# Patient Record
Sex: FEMALE | Race: WHITE | NOT HISPANIC OR LATINO
[De-identification: names, ages, dates, MRNs, and addresses within clinical notes are randomized per-mention and may not be internally consistent; named-entity substitution may affect disease eponyms.]

---

## 2017-05-16 ENCOUNTER — APPOINTMENT (OUTPATIENT)
Dept: NEUROLOGY | Facility: CLINIC | Age: 72
End: 2017-05-16

## 2017-05-16 VITALS
HEIGHT: 66 IN | BODY MASS INDEX: 30.53 KG/M2 | WEIGHT: 190 LBS | SYSTOLIC BLOOD PRESSURE: 157 MMHG | HEART RATE: 82 BPM | DIASTOLIC BLOOD PRESSURE: 84 MMHG | TEMPERATURE: 97.9 F | OXYGEN SATURATION: 97 %

## 2017-05-16 VITALS — SYSTOLIC BLOOD PRESSURE: 167 MMHG | DIASTOLIC BLOOD PRESSURE: 93 MMHG

## 2018-05-15 ENCOUNTER — APPOINTMENT (OUTPATIENT)
Dept: NEUROLOGY | Facility: CLINIC | Age: 73
End: 2018-05-15
Payer: MEDICARE

## 2018-05-15 VITALS
OXYGEN SATURATION: 97 % | WEIGHT: 177 LBS | BODY MASS INDEX: 28.45 KG/M2 | SYSTOLIC BLOOD PRESSURE: 159 MMHG | DIASTOLIC BLOOD PRESSURE: 81 MMHG | HEART RATE: 76 BPM | HEIGHT: 66 IN

## 2018-05-15 PROCEDURE — 99214 OFFICE O/P EST MOD 30 MIN: CPT

## 2019-04-22 ENCOUNTER — RX RENEWAL (OUTPATIENT)
Age: 74
End: 2019-04-22

## 2019-05-21 ENCOUNTER — APPOINTMENT (OUTPATIENT)
Dept: NEUROLOGY | Facility: CLINIC | Age: 74
End: 2019-05-21
Payer: MEDICARE

## 2019-05-21 VITALS
TEMPERATURE: 98.1 F | BODY MASS INDEX: 27 KG/M2 | SYSTOLIC BLOOD PRESSURE: 161 MMHG | DIASTOLIC BLOOD PRESSURE: 74 MMHG | HEART RATE: 89 BPM | WEIGHT: 168 LBS | HEIGHT: 66 IN | OXYGEN SATURATION: 97 %

## 2019-05-21 PROCEDURE — 99214 OFFICE O/P EST MOD 30 MIN: CPT

## 2019-05-21 NOTE — DISCUSSION/SUMMARY
[FreeTextEntry1] : Patient with Epilepsy s/p right frontotemporal ablation in 2003. Last known seizure was in 2005.\par \par Continues to do well on Depakote and Carbamazepine. Continues to have B/L tremor that does appear to be essential tremor that is also affected by Depakote. Symptoms are stable, will continue to monitor.  Recommend the following:\par \par 1. Continue Depakote 250mg tabs, 3 tablets daily. BOOM\par 2. Continue Carbamazepine ER 600mg TID ( 300mg tabs) BOOM\par 3. Check labs as ordered\par \par \par Follow up yearly.

## 2019-05-21 NOTE — PHYSICAL EXAM
[General Appearance - Alert] : alert [General Appearance - In No Acute Distress] : in no acute distress [Oriented To Time, Place, And Person] : oriented to person, place, and time [Impaired Insight] : insight and judgment were intact [Affect] : the affect was normal [Person] : oriented to person [Place] : oriented to place [Time] : oriented to time [Concentration Intact] : normal concentrating ability [Visual Intact] : visual attention was ~T not ~L decreased [Naming Objects] : no difficulty naming common objects [Repeating Phrases] : no difficulty repeating a phrase [Writing A Sentence] : no difficulty writing a sentence [Fluency] : fluency intact [Comprehension] : comprehension intact [Reading] : reading intact [Past History] : adequate knowledge of personal past history [Cranial Nerves Optic (II)] : visual acuity intact bilaterally,  visual fields full to confrontation, pupils equal round and reactive to light [Cranial Nerves Oculomotor (III)] : extraocular motion intact [Cranial Nerves Trigeminal (V)] : facial sensation intact symmetrically [Cranial Nerves Facial (VII)] : face symmetrical [Cranial Nerves Vestibulocochlear (VIII)] : hearing was intact bilaterally [Cranial Nerves Glossopharyngeal (IX)] : tongue and palate midline [Cranial Nerves Accessory (XI - Cranial And Spinal)] : head turning and shoulder shrug symmetric [Cranial Nerves Hypoglossal (XII)] : there was no tongue deviation with protrusion [Motor Tone] : muscle tone was normal in all four extremities [Motor Strength] : muscle strength was normal in all four extremities [No Muscle Atrophy] : normal bulk in all four extremities [Sensation Tactile Decrease] : light touch was intact [Abnormal Walk] : normal gait [Balance] : balance was intact [2+] : Ankle jerk left 2+ [Motor Strength Upper Extremities Bilaterally] : strength was normal in both upper extremities [Motor Strength Lower Extremities Bilaterally] : strength was normal in both lower extremities [Past-pointing] : there was no past-pointing [Plantar Reflex Right Only] : normal on the right [Plantar Reflex Left Only] : normal on the left [FreeTextEntry5] : B/L INTENTION TREMOR NOTED ON F-N EXAM WITH IMPAIRED FINE MOTOR SKILL.  [FreeTextEntry8] : FINE TREMOR TO THE B/L UE. NOT PROGRESSIVE

## 2019-05-21 NOTE — HISTORY OF PRESENT ILLNESS
[FreeTextEntry1] : Kimberly presents today for a follow up visit of Epilepsy s/p right frontotemporal ablation in 2003. Last seizures was in 2005.\par \par At this time she continues to do well. She denies any seizures, seizure-like activity, LOC, staring, tongue biting or urinary incontinence. She is complaint with medication without side effects.\par \par Continues to have bilateral hand tremor that gets worse with sustained positioning. She denies any changes to symptoms. \par \par No new neurological complaints.

## 2019-05-22 LAB
ALBUMIN SERPL ELPH-MCNC: 4.6 G/DL
ALP BLD-CCNC: 60 U/L
ALT SERPL-CCNC: 15 U/L
ANION GAP SERPL CALC-SCNC: 11 MMOL/L
AST SERPL-CCNC: 17 U/L
BASOPHILS # BLD AUTO: 0.1 K/UL
BASOPHILS NFR BLD AUTO: 1.5 %
BILIRUB DIRECT SERPL-MCNC: 0.1 MG/DL
BILIRUB INDIRECT SERPL-MCNC: NORMAL MG/DL
BILIRUB SERPL-MCNC: 0.2 MG/DL
CARBAMAZEPINE SERPL-MCNC: 10.8 UG/ML
CHLORIDE SERPL-SCNC: 99 MMOL/L
CO2 SERPL-SCNC: 26 MMOL/L
EOSINOPHIL # BLD AUTO: 0.17 K/UL
EOSINOPHIL NFR BLD AUTO: 2.6 %
HCT VFR BLD CALC: 34.7 %
HGB BLD-MCNC: 10.7 G/DL
IMM GRANULOCYTES NFR BLD AUTO: 0.5 %
LYMPHOCYTES # BLD AUTO: 1.89 K/UL
LYMPHOCYTES NFR BLD AUTO: 29.2 %
MAN DIFF?: NORMAL
MCHC RBC-ENTMCNC: 24.2 PG
MCHC RBC-ENTMCNC: 30.8 GM/DL
MCV RBC AUTO: 78.5 FL
MONOCYTES # BLD AUTO: 0.79 K/UL
MONOCYTES NFR BLD AUTO: 12.2 %
NEUTROPHILS # BLD AUTO: 3.5 K/UL
NEUTROPHILS NFR BLD AUTO: 54 %
PLATELET # BLD AUTO: 316 K/UL
POTASSIUM SERPL-SCNC: 5.1 MMOL/L
PROT SERPL-MCNC: 7 G/DL
RBC # BLD: 4.42 M/UL
RBC # FLD: 15.2 %
SODIUM SERPL-SCNC: 136 MMOL/L
VALPROATE SERPL-MCNC: 29 UG/ML
WBC # FLD AUTO: 6.48 K/UL

## 2019-06-18 ENCOUNTER — RESULT REVIEW (OUTPATIENT)
Age: 74
End: 2019-06-18

## 2019-11-25 ENCOUNTER — RX RENEWAL (OUTPATIENT)
Age: 74
End: 2019-11-25

## 2019-11-26 ENCOUNTER — APPOINTMENT (OUTPATIENT)
Dept: NEUROLOGY | Facility: CLINIC | Age: 74
End: 2019-11-26
Payer: MEDICARE

## 2019-11-26 PROCEDURE — 95819 EEG AWAKE AND ASLEEP: CPT

## 2019-11-27 PROCEDURE — 95953: CPT

## 2019-11-28 PROCEDURE — 95953: CPT

## 2019-11-29 ENCOUNTER — APPOINTMENT (OUTPATIENT)
Dept: NEUROLOGY | Facility: CLINIC | Age: 74
End: 2019-11-29

## 2019-12-15 ENCOUNTER — FORM ENCOUNTER (OUTPATIENT)
Age: 74
End: 2019-12-15

## 2019-12-16 ENCOUNTER — OUTPATIENT (OUTPATIENT)
Dept: OUTPATIENT SERVICES | Facility: HOSPITAL | Age: 74
LOS: 1 days | End: 2019-12-16
Payer: MEDICARE

## 2019-12-16 ENCOUNTER — APPOINTMENT (OUTPATIENT)
Dept: MRI IMAGING | Facility: HOSPITAL | Age: 74
End: 2019-12-16
Payer: MEDICARE

## 2019-12-16 PROCEDURE — 70553 MRI BRAIN STEM W/O & W/DYE: CPT

## 2019-12-16 PROCEDURE — A9585: CPT

## 2019-12-16 PROCEDURE — 70553 MRI BRAIN STEM W/O & W/DYE: CPT | Mod: 26

## 2019-12-23 ENCOUNTER — RX RENEWAL (OUTPATIENT)
Age: 74
End: 2019-12-23

## 2019-12-27 ENCOUNTER — APPOINTMENT (OUTPATIENT)
Dept: NUCLEAR MEDICINE | Facility: IMAGING CENTER | Age: 74
End: 2019-12-27

## 2019-12-31 ENCOUNTER — OUTPATIENT (OUTPATIENT)
Dept: OUTPATIENT SERVICES | Facility: HOSPITAL | Age: 74
LOS: 1 days | End: 2019-12-31
Payer: MEDICARE

## 2019-12-31 ENCOUNTER — APPOINTMENT (OUTPATIENT)
Dept: NUCLEAR MEDICINE | Facility: IMAGING CENTER | Age: 74
End: 2019-12-31
Payer: MEDICARE

## 2019-12-31 DIAGNOSIS — G40.909 EPILEPSY, UNSPECIFIED, NOT INTRACTABLE, WITHOUT STATUS EPILEPTICUS: ICD-10-CM

## 2019-12-31 PROCEDURE — 78608 BRAIN IMAGING (PET): CPT | Mod: 26

## 2019-12-31 PROCEDURE — A9552: CPT

## 2019-12-31 PROCEDURE — 78608 BRAIN IMAGING (PET): CPT

## 2020-01-06 ENCOUNTER — APPOINTMENT (OUTPATIENT)
Dept: NEUROLOGY | Facility: CLINIC | Age: 75
End: 2020-01-06

## 2020-01-14 ENCOUNTER — APPOINTMENT (OUTPATIENT)
Dept: NEUROLOGY | Facility: CLINIC | Age: 75
End: 2020-01-14
Payer: MEDICARE

## 2020-01-14 VITALS
OXYGEN SATURATION: 98 % | HEART RATE: 76 BPM | BODY MASS INDEX: 27.16 KG/M2 | TEMPERATURE: 98.1 F | HEIGHT: 65.5 IN | WEIGHT: 165 LBS

## 2020-01-14 VITALS — SYSTOLIC BLOOD PRESSURE: 139 MMHG | DIASTOLIC BLOOD PRESSURE: 81 MMHG

## 2020-01-14 PROCEDURE — 99214 OFFICE O/P EST MOD 30 MIN: CPT

## 2020-01-16 NOTE — HISTORY OF PRESENT ILLNESS
[FreeTextEntry1] : Kimberly presents today for a follow up visit of Epilepsy s/p right frontotemporal ablation in 2003. Last seizures was in 2005.\par \par Since last seen patient has been diagnosed with breast CA and started on radiation and chemo. In 10/2019 patient had a long few weeks of treatment and daughter noticed episodes of staring and repetitive movements with some confusion. In turn, AED were check and was found to have a subtherapeutic VPA: 35 (). Her brain MRI was suspicious and had a PET scan that did not show signs of malignancy. \par \par At this time patient and family deny any known seizure, seizure-like activity, LOC, lapses in time, tongue biting, staring spells or urinary incontinence. Continues to receive Arimidex per oncology for the next 5 years.

## 2020-01-16 NOTE — DATA REVIEWED
[de-identified] : \par EXAM: MR BRAIN WAW IC \par \par PROCEDURE DATE: 12/16/2019 \par \par \par \par INTERPRETATION: INDICATIONS: History of epilepsy status post right frontal \par temporal ablation, recent diagnosis of breast cancer, seizures. Evaluate for \par metastasis. \par \par TECHNIQUE: \par Multiplanar MR images of the brain were obtained using sagittal volumetric \par T1 with multiplanar reformats, axial T2, FLAIR, GRE, and diffusion weighted \par imaging. Following the administration of 7.5 mL of Gadavist sagittal and \par volumetric T1-weighted images with multiplanar reformats were also obtained. \par \par COMPARISON EXAMINATION: None. \par \par FINDINGS: \par \par VENTRICLES AND SULCI: There is asymmetric ex vacuo dilatation of the right \par frontal horn secondary to right-sided parenchymal volume loss. No \par hydrocephalus. \par INTRA-AXIAL: There is encephalomalacia in the right medial frontal and \par temporal lobes likely from prior resections. There is T2/FLAIR \par hyperintensity in the right temporal lobe adjacent to the resection site \par likely due to gliosis. There is T2/FLAIR hyperintensity within the medial \par right frontal lobe extending to the genu of the corpus callosum at the \par resection site likely due to gliosis. There is susceptibility related signal \par loss along the resection cavity due to prior hemosiderin deposition. There \par is T2 hyperintensity in the periventricular white matter and within a \par punctate focus of the left corona radiata likely reflecting small vessel \par ischemic disease. There is no enhancing parenchymal mass or midline shift. \par There is no acute infarct. \par EXTRA-AXIAL: Deep to the right frontal craniotomy site, there is a CSF \par intensity fluid collection measuring approximately 7.7 x 2.2 cm on axial \par imaging. There is dural thickening and enhancement deep to the craniotomy \par site due to postsurgical changes. \par VISUALIZED SINUSES: The right maxillary sinus is obscured due to artifact. \par No air-fluid levels are identified in the remaining paranasal sinuses. \par VISUALIZED MASTOIDS: Clear. \par CALVARIUM: There is a patchy area of enhancement within the left frontal \par bone (series 701 image 61). There is hyperostosis frontalis interna. Status \par post right frontotemporal craniotomy. \par FLOW VOIDS: Present. \par \par \par IMPRESSION: \par \par No abnormal parenchymal enhancement, mass effect or midline shift. \par \par Status post right frontotemporal craniotomy with prior resection cavities in \par the right frontal and temporal lobes with subjacent signal abnormality \par within the right frontal and temporal lobes which may reflect postsurgical \par changes of a correlation with prior imaging is recommended. \par \par Right cerebral convexity CSF intensity fluid collection which is likely \par postsurgical. \par \par Patchy enhancement within the left frontal bone which may be secondary to \par hyperostosis although infiltrative process such as neoplasm cannot be \par excluded. Recommend correlation with prior imaging. If prior imaging is not \par available, correlation with CT head without contrast recommended. \par \par \par \par \par \par Thank you for the opportunity to participate in the care of this patient. \par \par SILVESTRE CORNELIUS M.D., RADIOLOGY RESIDENT \par This document has been electronically signed. \par ALLYSON SHAHID M.D., ATTENDING RADIOLOGIST \par This document has been electronically signed. Dec 16 2019 [de-identified] : EXAM: PET BRAIN METABOLIC PRESURG \par \par \par PROCEDURE DATE: 12/31/2019 \par \par \par \par INTERPRETATION: PROCEDURE: PET/CT BRAIN \par \par RADIOPHARMACEUTICAL: 8.01 mCi F-18, FDG, I.V. \par \par CLINICAL STATEMENT: 74-year-old female with epilepsy status post right \par frontal temporal ablation, referred for localization of seizure focus. \par Patient is status post radiation therapy for left breast cancer October 2019. \par \par TECHNIQUE: Fasting blood sugar measured prior to injection of \par radiopharmaceutical was 96 mg/dl. Following intravenous injection of the \par radiopharmaceutical and an uptake period of approximately 55 minutes, \par FDG-PET/CT of the brain was obtained on a [employer] 710. The CT protocol \par was optimized for PET attenuation correction and to provide anatomic detail \par for localization of PET abnormalities. The CT protocol was not designed to \par produce and cannot replace state-of-the-art diagnostic CT images with \par specific imaging protocols for different body parts and indications. \par \par COMPARISON: No prior FDG PET/CT. MRI of brain dated 12/16/2019 was \par reviewed. Report of CT head without contrast from an outside institution \par dated 12/23/2019, also was reviewed. \par \par FINDINGS: Status post right frontotemporal craniotomy with resection \par cavities in right frontal and temporal regions, as seen on MRI, and \par demonstrating absent FDG activity. Right cerebral convexity photopenic fluid \par collection, as seen on MRI, thought to be postsurgical, not significantly \par changed. There is encephalomalacia in the right medial frontal and temporal \par lobes, also present previously, and demonstrating decreased FDG activity. \par Mild asymmetric ex vacuo dilatation of the right frontal horn, unchanged. \par \par FDG activity in the basal ganglia is relatively increased, more pronounced \par on the left. \par \par There is reduced FDG activity in the superior frontal regions, right greater \par than left, bilateral parietal regions, and left temporal lobe. \par \par There is non-FDG-avid, asymmetric thickening of left frontal calvarium, \par compatible with hyperostosis. \par \par IMPRESSION: Abnormal brain FDG-PET/CT scan. \par \par 1. Absence of FDG activity in a well-circumscribed region in right temporal \par and orbital frontal cortex is consistent with prior surgery. \par \par 2. Hypometabolism in parietal and superior frontal regions, right greater \par than left, and left temporal lobe, are of uncertain significance. Findings \par may represent additional interictal seizure foci. Differential diagnosis \par also includes a neurodegenerative process. Please correlate clinically. \par \par 3. Relatively increased FDG metabolism in the basal ganglia is of uncertain \par significance. This finding raises the possibility of superimposed \par Parkinsonian process. Dopaminergic imaging, as well as repeat FDG-PET/CT \par imaging in 18-24 months, may be considered, if clinically indicated. \par \par 4. Non-FDG-avid asymmetric thickening of left frontal calvarium, compatible \par with hyperostosis. \par \par \par \par \par \par \par \par \par SUSAN HUBER M.D., NUCLEAR MEDICINE ATTENDING \par This document has been electronically signed. Jan 2 2020 1:39PM \par \par \par \par

## 2020-01-16 NOTE — DISCUSSION/SUMMARY
[FreeTextEntry1] : Patient with Epilepsy s/p right frontotemporal ablation in 2003. Last seizures was in 2005.\par \par Last seizure like activity was in 10/2019 after starting chemo and radiation for breast CA. MRI and PET scan were not suspicious of malignancy. \par \par At this time they deny any seizure, seizure-like activity, LOC, lapses in time, tongue biting, staring spells or urinary incontinence. MRI and PET scan stable. VPA level continues to be subtherapeutic. At this time we recommend the following: \par \par 1. Increase VPA to 250mg tabs, 5 tabs daily\par 2. RTC in 2 months if stable

## 2020-01-16 NOTE — PHYSICAL EXAM
[General Appearance - Alert] : alert [General Appearance - In No Acute Distress] : in no acute distress [Impaired Insight] : insight and judgment were intact [Oriented To Time, Place, And Person] : oriented to person, place, and time [Affect] : the affect was normal [Person] : oriented to person [Place] : oriented to place [Time] : oriented to time [Visual Intact] : visual attention was ~T not ~L decreased [Naming Objects] : no difficulty naming common objects [Concentration Intact] : normal concentrating ability [Writing A Sentence] : no difficulty writing a sentence [Repeating Phrases] : no difficulty repeating a phrase [Fluency] : fluency intact [Comprehension] : comprehension intact [Reading] : reading intact [Past History] : adequate knowledge of personal past history [Cranial Nerves Optic (II)] : visual acuity intact bilaterally,  visual fields full to confrontation, pupils equal round and reactive to light [Cranial Nerves Oculomotor (III)] : extraocular motion intact [Cranial Nerves Trigeminal (V)] : facial sensation intact symmetrically [Cranial Nerves Vestibulocochlear (VIII)] : hearing was intact bilaterally [Cranial Nerves Glossopharyngeal (IX)] : tongue and palate midline [Cranial Nerves Facial (VII)] : face symmetrical [Cranial Nerves Accessory (XI - Cranial And Spinal)] : head turning and shoulder shrug symmetric [Motor Tone] : muscle tone was normal in all four extremities [Cranial Nerves Hypoglossal (XII)] : there was no tongue deviation with protrusion [Motor Strength] : muscle strength was normal in all four extremities [No Muscle Atrophy] : normal bulk in all four extremities [Sensation Tactile Decrease] : light touch was intact [Abnormal Walk] : normal gait [Balance] : balance was intact [2+] : Ankle jerk left 2+ [Motor Strength Upper Extremities Bilaterally] : strength was normal in both upper extremities [Motor Strength Lower Extremities Bilaterally] : strength was normal in both lower extremities [Past-pointing] : there was no past-pointing [Plantar Reflex Right Only] : normal on the right [Plantar Reflex Left Only] : normal on the left [FreeTextEntry8] : FINE TREMOR TO THE B/L UE. NOT PROGRESSIVE [FreeTextEntry5] : B/L INTENTION TREMOR NOTED ON F-N EXAM WITH IMPAIRED FINE MOTOR SKILL.

## 2020-02-06 ENCOUNTER — FORM ENCOUNTER (OUTPATIENT)
Age: 75
End: 2020-02-06

## 2020-02-07 ENCOUNTER — OUTPATIENT (OUTPATIENT)
Dept: OUTPATIENT SERVICES | Facility: HOSPITAL | Age: 75
LOS: 1 days | End: 2020-02-07
Payer: MEDICARE

## 2020-02-07 PROCEDURE — 78306 BONE IMAGING WHOLE BODY: CPT | Mod: 26

## 2020-02-07 PROCEDURE — 78306 BONE IMAGING WHOLE BODY: CPT

## 2020-02-07 PROCEDURE — 73010 X-RAY EXAM OF SHOULDER BLADE: CPT

## 2020-02-07 PROCEDURE — A9503: CPT

## 2020-02-07 PROCEDURE — 73010 X-RAY EXAM OF SHOULDER BLADE: CPT | Mod: 26,RT

## 2020-03-16 RX ORDER — DIAZEPAM 5 MG/1
5 TABLET ORAL
Qty: 2 | Refills: 0 | Status: DISCONTINUED | COMMUNITY
Start: 2019-11-25 | End: 2020-03-16

## 2020-03-17 ENCOUNTER — APPOINTMENT (OUTPATIENT)
Dept: NEUROLOGY | Facility: CLINIC | Age: 75
End: 2020-03-17

## 2020-05-19 ENCOUNTER — APPOINTMENT (OUTPATIENT)
Dept: NEUROLOGY | Facility: CLINIC | Age: 75
End: 2020-05-19

## 2020-08-12 ENCOUNTER — APPOINTMENT (OUTPATIENT)
Dept: NEUROLOGY | Facility: CLINIC | Age: 75
End: 2020-08-12
Payer: MEDICARE

## 2020-08-12 PROCEDURE — 99215 OFFICE O/P EST HI 40 MIN: CPT | Mod: 95

## 2020-08-12 NOTE — HISTORY OF PRESENT ILLNESS
[FreeTextEntry1] : Kimberly presents today for a follow up visit of Epilepsy s/p right frontotemporal ablation in 2003. \par \par Last seizure was in Feb 2020 while prepping over night for an outpatient colonoscopy. No further event since then. Recently had a brain MRI that was suspicious but had a PET scan that did not show signs of malignancy.\par \par At this time patient and family are reporting increase in fatigue after her 2pm VPA dose. After this she lays on the couch and can sleep intermittent for a few hours. Reports that she falls asleep at night fine but wakes up for several hours. \par \par Current medication regimen: \par Carbatrol: 8am, 5:30pm and 8pm\par VPA: 2pm and 10pm\par

## 2020-08-12 NOTE — DISCUSSION/SUMMARY
[FreeTextEntry1] : Patient with Epilepsy s/p right frontotemporal ablation in 2003. \par \par No further seizures since Feb 2020 while prepping for a colonoscopy that was recommended due to positive cola guard testing. No longer has a GI and is in search of a new one. Advised to see GI and if it is medially necessary to have a colonoscopy, for it to be done in patient to allow for seizure monitoring. Will provide a list of physicians to choose from. \par \par Regarding patients fatigue after VPA dose. Given she naps fairly late in the day, likely causing poor night time sleep. Advised patient to move VPA dose up to 11:30 with lunch. If this is not effective, will start OTC Melatonin. \par \par Will continue AED doses as stated. \par \par Has NPT scheduled for cognitive evaluation via TEB with Dr. Clifton. \par \par RTC in November if stable\par \par All questions and concerns were addressed. Emotional support was rendered.

## 2020-09-02 ENCOUNTER — TRANSCRIPTION ENCOUNTER (OUTPATIENT)
Age: 75
End: 2020-09-02

## 2020-09-03 ENCOUNTER — TRANSCRIPTION ENCOUNTER (OUTPATIENT)
Age: 75
End: 2020-09-03

## 2020-09-04 ENCOUNTER — APPOINTMENT (OUTPATIENT)
Dept: NEUROLOGY | Facility: CLINIC | Age: 75
End: 2020-09-04
Payer: MEDICARE

## 2020-09-04 PROCEDURE — 96116 NUBHVL XM PHYS/QHP 1ST HR: CPT | Mod: 95

## 2020-09-11 ENCOUNTER — APPOINTMENT (OUTPATIENT)
Dept: NEUROLOGY | Facility: CLINIC | Age: 75
End: 2020-09-11
Payer: MEDICARE

## 2020-09-11 PROCEDURE — 96133 NRPSYC TST EVAL PHYS/QHP EA: CPT

## 2020-09-11 PROCEDURE — 96132 NRPSYC TST EVAL PHYS/QHP 1ST: CPT

## 2020-09-11 PROCEDURE — 96138 PSYCL/NRPSYC TECH 1ST: CPT

## 2020-09-11 PROCEDURE — 96139 PSYCL/NRPSYC TST TECH EA: CPT

## 2020-09-30 ENCOUNTER — TRANSCRIPTION ENCOUNTER (OUTPATIENT)
Age: 75
End: 2020-09-30

## 2020-10-02 ENCOUNTER — APPOINTMENT (OUTPATIENT)
Dept: NEUROLOGY | Facility: CLINIC | Age: 75
End: 2020-10-02
Payer: MEDICARE

## 2020-10-02 PROCEDURE — 90837 PSYTX W PT 60 MINUTES: CPT | Mod: 95

## 2020-11-04 ENCOUNTER — APPOINTMENT (OUTPATIENT)
Dept: GASTROENTEROLOGY | Facility: CLINIC | Age: 75
End: 2020-11-04
Payer: MEDICARE

## 2020-11-04 DIAGNOSIS — R19.5 OTHER FECAL ABNORMALITIES: ICD-10-CM

## 2020-11-04 PROCEDURE — 99204 OFFICE O/P NEW MOD 45 MIN: CPT | Mod: 95

## 2020-11-04 RX ORDER — CALCIUM CARBONATE/VITAMIN D3 600MG-5MCG
TABLET ORAL
Refills: 0 | Status: DISCONTINUED | COMMUNITY
End: 2020-11-04

## 2020-11-04 NOTE — PHYSICAL EXAM
[General Appearance - Alert] : alert [General Appearance - Well Nourished] : well nourished [General Appearance - Well-Appearing] : healthy appearing [Sclera] : the sclera and conjunctiva were normal [Outer Ear] : the ears and nose were normal in appearance [Neck Appearance] : the appearance of the neck was normal [] : no respiratory distress [Abdomen Soft] : soft [Abdomen Tenderness] : non-tender [Abdomen Mass (___ Cm)] : no abdominal mass palpated [Involuntary Movements] : no involuntary movements were seen [Skin Color & Pigmentation] : normal skin color and pigmentation [No Focal Deficits] : no focal deficits [Affect] : the affect was normal

## 2020-11-04 NOTE — HISTORY OF PRESENT ILLNESS
[Home] : at home, [unfilled] , at the time of the visit. [Medical Office: ([facility] [place])___] : at the medical office located in  [Verbal consent obtained from patient] : the patient, [unfilled] [FreeTextEntry1] : 76 yo female with a hx of epilepsy s/p right frontotemporal ablation in 2003 here to arrange a colonoscopy for positive Cologuard test.  No BRBPR, no dark stools.  Hx of hemorrhoids.  No N/V/D, no constipation.  \par Lost 20 lbs in the past 6 months.  Appetite is ok, does not tend to skip meals.  No heartburn or reflux symptoms.  Last colonoscopy attempt was 12 years ago but not completed because of ?seizures.  No hx complete colonoscopy.   Cologuard test was + in December 2019.  Was to have colonoscopy in Feb 2020 but had seizure while prepping overnight for the procedure.\par \par No famhx of stomach, colon or pancreatic cancer.  No IBD.  Daughter with celiac, Dr. Pro.\par \par Retired, hospital .  \par

## 2020-11-04 NOTE — ASSESSMENT
[FreeTextEntry1] : 76 yo female with a hx of epilepsy s/p right frontotemporal ablation in 2003 here to arrange a colonoscopy for positive Cologuard test\par \par - Will arrange for an inpt colonoscopy with neurologic monitoring at [facility] given pt's prior seizure event while prepping in February\par - Have contacted Gretchen Carrera NP to make arrangements for admission

## 2020-11-09 ENCOUNTER — INPATIENT (INPATIENT)
Facility: HOSPITAL | Age: 75
LOS: 1 days | Discharge: HOME | DRG: 101 | End: 2020-11-11
Attending: PSYCHIATRY & NEUROLOGY | Admitting: PSYCHIATRY & NEUROLOGY
Payer: MEDICARE

## 2020-11-09 VITALS
TEMPERATURE: 98 F | DIASTOLIC BLOOD PRESSURE: 77 MMHG | HEART RATE: 79 BPM | RESPIRATION RATE: 20 BRPM | OXYGEN SATURATION: 99 % | SYSTOLIC BLOOD PRESSURE: 125 MMHG

## 2020-11-09 DIAGNOSIS — Z98.890 OTHER SPECIFIED POSTPROCEDURAL STATES: Chronic | ICD-10-CM

## 2020-11-09 DIAGNOSIS — R63.8 OTHER SYMPTOMS AND SIGNS CONCERNING FOOD AND FLUID INTAKE: ICD-10-CM

## 2020-11-09 DIAGNOSIS — E87.1 HYPO-OSMOLALITY AND HYPONATREMIA: ICD-10-CM

## 2020-11-09 DIAGNOSIS — G40.909 EPILEPSY, UNSPECIFIED, NOT INTRACTABLE, WITHOUT STATUS EPILEPTICUS: ICD-10-CM

## 2020-11-09 DIAGNOSIS — I10 ESSENTIAL (PRIMARY) HYPERTENSION: ICD-10-CM

## 2020-11-09 DIAGNOSIS — Z87.898 PERSONAL HISTORY OF OTHER SPECIFIED CONDITIONS: ICD-10-CM

## 2020-11-09 DIAGNOSIS — Z29.9 ENCOUNTER FOR PROPHYLACTIC MEASURES, UNSPECIFIED: ICD-10-CM

## 2020-11-09 DIAGNOSIS — R19.5 OTHER FECAL ABNORMALITIES: ICD-10-CM

## 2020-11-09 LAB
ALBUMIN SERPL ELPH-MCNC: 3.7 G/DL — SIGNIFICANT CHANGE UP (ref 3.3–5)
ALP SERPL-CCNC: 38 U/L — LOW (ref 40–120)
ALT FLD-CCNC: 14 U/L — SIGNIFICANT CHANGE UP (ref 10–45)
ANION GAP SERPL CALC-SCNC: 7 MMOL/L — SIGNIFICANT CHANGE UP (ref 5–17)
APTT BLD: 29.4 SEC — SIGNIFICANT CHANGE UP (ref 27.5–35.5)
AST SERPL-CCNC: 20 U/L — SIGNIFICANT CHANGE UP (ref 10–40)
BASOPHILS # BLD AUTO: 0.06 K/UL — SIGNIFICANT CHANGE UP (ref 0–0.2)
BASOPHILS NFR BLD AUTO: 1.1 % — SIGNIFICANT CHANGE UP (ref 0–2)
BILIRUB SERPL-MCNC: <0.2 MG/DL — SIGNIFICANT CHANGE UP (ref 0.2–1.2)
BLD GP AB SCN SERPL QL: NEGATIVE — SIGNIFICANT CHANGE UP
BUN SERPL-MCNC: 19 MG/DL — SIGNIFICANT CHANGE UP (ref 7–23)
CALCIUM SERPL-MCNC: 8.9 MG/DL — SIGNIFICANT CHANGE UP (ref 8.4–10.5)
CHLORIDE SERPL-SCNC: 97 MMOL/L — SIGNIFICANT CHANGE UP (ref 96–108)
CO2 SERPL-SCNC: 28 MMOL/L — SIGNIFICANT CHANGE UP (ref 22–31)
CREAT SERPL-MCNC: 0.48 MG/DL — LOW (ref 0.5–1.3)
EOSINOPHIL # BLD AUTO: 0.14 K/UL — SIGNIFICANT CHANGE UP (ref 0–0.5)
EOSINOPHIL NFR BLD AUTO: 2.5 % — SIGNIFICANT CHANGE UP (ref 0–6)
GLUCOSE SERPL-MCNC: 97 MG/DL — SIGNIFICANT CHANGE UP (ref 70–99)
HCT VFR BLD CALC: 35.7 % — SIGNIFICANT CHANGE UP (ref 34.5–45)
HGB BLD-MCNC: 11.7 G/DL — SIGNIFICANT CHANGE UP (ref 11.5–15.5)
IMM GRANULOCYTES NFR BLD AUTO: 0.2 % — SIGNIFICANT CHANGE UP (ref 0–1.5)
INR BLD: 0.88 — SIGNIFICANT CHANGE UP (ref 0.88–1.16)
LYMPHOCYTES # BLD AUTO: 1.58 K/UL — SIGNIFICANT CHANGE UP (ref 1–3.3)
LYMPHOCYTES # BLD AUTO: 28 % — SIGNIFICANT CHANGE UP (ref 13–44)
MAGNESIUM SERPL-MCNC: 2.2 MG/DL — SIGNIFICANT CHANGE UP (ref 1.6–2.6)
MCHC RBC-ENTMCNC: 30.1 PG — SIGNIFICANT CHANGE UP (ref 27–34)
MCHC RBC-ENTMCNC: 32.8 GM/DL — SIGNIFICANT CHANGE UP (ref 32–36)
MCV RBC AUTO: 91.8 FL — SIGNIFICANT CHANGE UP (ref 80–100)
MONOCYTES # BLD AUTO: 0.65 K/UL — SIGNIFICANT CHANGE UP (ref 0–0.9)
MONOCYTES NFR BLD AUTO: 11.5 % — SIGNIFICANT CHANGE UP (ref 2–14)
NEUTROPHILS # BLD AUTO: 3.21 K/UL — SIGNIFICANT CHANGE UP (ref 1.8–7.4)
NEUTROPHILS NFR BLD AUTO: 56.7 % — SIGNIFICANT CHANGE UP (ref 43–77)
NRBC # BLD: 0 /100 WBCS — SIGNIFICANT CHANGE UP (ref 0–0)
PHOSPHATE SERPL-MCNC: 3.3 MG/DL — SIGNIFICANT CHANGE UP (ref 2.5–4.5)
PLATELET # BLD AUTO: 181 K/UL — SIGNIFICANT CHANGE UP (ref 150–400)
POTASSIUM SERPL-MCNC: 4.9 MMOL/L — SIGNIFICANT CHANGE UP (ref 3.5–5.3)
POTASSIUM SERPL-SCNC: 4.9 MMOL/L — SIGNIFICANT CHANGE UP (ref 3.5–5.3)
PROT SERPL-MCNC: 6 G/DL — SIGNIFICANT CHANGE UP (ref 6–8.3)
PROTHROM AB SERPL-ACNC: 10.6 SEC — SIGNIFICANT CHANGE UP (ref 10.6–13.6)
RBC # BLD: 3.89 M/UL — SIGNIFICANT CHANGE UP (ref 3.8–5.2)
RBC # FLD: 13.1 % — SIGNIFICANT CHANGE UP (ref 10.3–14.5)
RH IG SCN BLD-IMP: POSITIVE — SIGNIFICANT CHANGE UP
SARS-COV-2 N GENE NPH QL NAA+PROBE: NOT DETECTED
SODIUM SERPL-SCNC: 132 MMOL/L — LOW (ref 135–145)
WBC # BLD: 5.65 K/UL — SIGNIFICANT CHANGE UP (ref 3.8–10.5)
WBC # FLD AUTO: 5.65 K/UL — SIGNIFICANT CHANGE UP (ref 3.8–10.5)

## 2020-11-09 PROCEDURE — 99223 1ST HOSP IP/OBS HIGH 75: CPT | Mod: GC

## 2020-11-09 PROCEDURE — 99223 1ST HOSP IP/OBS HIGH 75: CPT

## 2020-11-09 RX ORDER — DIVALPROEX SODIUM 500 MG/1
1 TABLET, DELAYED RELEASE ORAL
Qty: 0 | Refills: 0 | DISCHARGE

## 2020-11-09 RX ORDER — CARBAMAZEPINE 200 MG
2 TABLET ORAL
Qty: 0 | Refills: 0 | DISCHARGE

## 2020-11-09 RX ORDER — DIVALPROEX SODIUM 500 MG/1
3 TABLET, DELAYED RELEASE ORAL
Qty: 0 | Refills: 0 | DISCHARGE

## 2020-11-09 RX ORDER — DIVALPROEX SODIUM 500 MG/1
750 TABLET, DELAYED RELEASE ORAL DAILY
Refills: 0 | Status: DISCONTINUED | OUTPATIENT
Start: 2020-11-10 | End: 2020-11-11

## 2020-11-09 RX ORDER — OLMESARTAN MEDOXOMIL 5 MG/1
20 TABLET, FILM COATED ORAL DAILY
Refills: 0 | Status: DISCONTINUED | OUTPATIENT
Start: 2020-11-09 | End: 2020-11-11

## 2020-11-09 RX ORDER — SOD SULF/SODIUM/NAHCO3/KCL/PEG
2000 SOLUTION, RECONSTITUTED, ORAL ORAL ONCE
Refills: 0 | Status: COMPLETED | OUTPATIENT
Start: 2020-11-09 | End: 2020-11-09

## 2020-11-09 RX ORDER — ALENDRONATE SODIUM 70 MG/1
1 TABLET ORAL
Qty: 0 | Refills: 0 | DISCHARGE

## 2020-11-09 RX ORDER — DIVALPROEX SODIUM 500 MG/1
500 TABLET, DELAYED RELEASE ORAL AT BEDTIME
Refills: 0 | Status: DISCONTINUED | OUTPATIENT
Start: 2020-11-09 | End: 2020-11-11

## 2020-11-09 RX ORDER — SOD SULF/SODIUM/NAHCO3/KCL/PEG
2000 SOLUTION, RECONSTITUTED, ORAL ORAL ONCE
Refills: 0 | Status: COMPLETED | OUTPATIENT
Start: 2020-11-10 | End: 2020-11-10

## 2020-11-09 RX ORDER — SOD SULF/SODIUM/NAHCO3/KCL/PEG
2000 SOLUTION, RECONSTITUTED, ORAL ORAL ONCE
Refills: 0 | Status: DISCONTINUED | OUTPATIENT
Start: 2020-11-09 | End: 2020-11-09

## 2020-11-09 RX ORDER — CARBAMAZEPINE 200 MG
1 TABLET ORAL
Qty: 0 | Refills: 0 | DISCHARGE

## 2020-11-09 RX ORDER — CARBAMAZEPINE 200 MG
600 TABLET ORAL
Refills: 0 | Status: DISCONTINUED | OUTPATIENT
Start: 2020-11-09 | End: 2020-11-11

## 2020-11-09 RX ORDER — DIVALPROEX SODIUM 500 MG/1
2 TABLET, DELAYED RELEASE ORAL
Qty: 0 | Refills: 0 | DISCHARGE

## 2020-11-09 RX ORDER — OLMESARTAN MEDOXOMIL 5 MG/1
1 TABLET, FILM COATED ORAL
Qty: 0 | Refills: 0 | DISCHARGE

## 2020-11-09 RX ADMIN — DIVALPROEX SODIUM 500 MILLIGRAM(S): 500 TABLET, DELAYED RELEASE ORAL at 21:31

## 2020-11-09 RX ADMIN — Medication 2000 MILLILITER(S): at 19:42

## 2020-11-09 RX ADMIN — OLMESARTAN MEDOXOMIL 20 MILLIGRAM(S): 5 TABLET, FILM COATED ORAL at 17:13

## 2020-11-09 RX ADMIN — Medication 600 MILLIGRAM(S): at 19:42

## 2020-11-09 NOTE — H&P ADULT - NSHPPHYSICALEXAM_GEN_ALL_CORE
VITAL SIGNS:  Vital Signs Last 24 Hrs  T(C): --  T(F): --  HR: --  BP: --  BP(mean): --  RR: --  SpO2: --    PHYSICAL EXAM:  General: in NAD, lying comfortably in bed  HEENT: normocephalic, atraumatic; PERRL, anicteric sclera; MMM  Neck: supple, no JVD, no thyromegaly, no lymphadenopathy  Cardiovascular: +S1/S2, RRR, no M/G/R  Respiratory: clear to auscultation B/L; no wheezing, no rales, no rhonchi  Gastrointestinal: soft, NT/ND; +BSx4, no organomegaly  Extremities: WWP; no edema, clubbing or cyanosis  Vascular: 2+ radial, DP/PT pulses B/L  Neurological:  - Mental Status: AAOx3; speech is fluent with intact naming, repetition, and comprehension  - Cranial Nerves II -XII:  II: PERRLA; visual fields are full to confrontation  III, IV, VI: EOMI, no nystagmus appreciated  V: facial sensation intact to light touch V1-V3 b/l  VII: no ptosis, no facial droop, symmetric eyebrow raise and closure  VIII: hearing intact to finger rub b/l  IX, X: uvula is midline, soft palate rises symmetrically  XI: head turning and shoulder shrug intact b/l  XII: tongue protrusion midline  - Motor: strength is 5/5 b/l LLE & RLE, 5/5 b/l LUE & RUE. normal muscle bulk and tone throughout, no pronator drift  - Sensation: intact  - Gait: deferred Vital Signs Last 24 Hrs  T(C): 36.4 (09 Nov 2020 14:39), Max: 36.4 (09 Nov 2020 14:39)  T(F): 97.6 (09 Nov 2020 14:39), Max: 97.6 (09 Nov 2020 14:39)  HR: 79 (09 Nov 2020 14:39) (79 - 79)  BP: 125/77 (09 Nov 2020 14:39) (125/77 - 125/77)  BP(mean): --  RR: 20 (09 Nov 2020 14:39) (20 - 20)  SpO2: 99% (09 Nov 2020 14:39) (99% - 99%)     PHYSICAL EXAM:  General: in NAD, lying comfortably in bed  HEENT: normocephalic, atraumatic; PERRL, anicteric sclera; MMM  Neck: supple, no JVD, no thyromegaly, no lymphadenopathy  Cardiovascular: +S1/S2, RRR, no M/G/R  Respiratory: clear to auscultation B/L; no wheezing, no rales, no rhonchi  Gastrointestinal: soft, NT/ND; +BSx4, no organomegaly  Extremities: WWP; no edema, clubbing or cyanosis  Vascular: 2+ radial, DP/PT pulses B/L  Neurological:  - Mental Status: AAOx3; speech is fluent with intact naming, repetition, and comprehension  - Cranial Nerves II -XII:  II: PERRLA; visual fields are full to confrontation  III, IV, VI: EOMI, no nystagmus appreciated  V: facial sensation intact to light touch V1-V3 b/l  VII: no ptosis, no facial droop, symmetric eyebrow raise and closure  VIII: hearing intact to finger rub b/l  IX, X: uvula is midline, soft palate rises symmetrically  XI: head turning and shoulder shrug intact b/l  XII: tongue protrusion midline  - Motor: strength is 5/5 b/l LLE & RLE, 5/5 b/l LUE & RUE. normal muscle bulk and tone throughout, no pronator drift  - Sensation: intact  - Gait: deferred

## 2020-11-09 NOTE — CONSULT NOTE ADULT - ASSESSMENT
75yF w/Hx of R frontal temporal lesion s/p excision c/b seizure d/o, L breast CA s/p lumpectomy and RT, HTN, presenting for VEEG monitoring during colonoscopy prep as it has triggered her in the past.    1. Positive Cologuard - Patient with positive non-invasive screening test for CRC in January of this year, necessitating follow-up diagnostic colonoscopy. As patient has been triggered by colonoscopy prep in the past, plan for admission to monitor for seizure activity during bowel preparation. Plan for diagnostic colonoscopy tomorrow.  - Clear liquid diet tonight  - Please order split-dose prep with 2L GoLytely given at 17:00 today and an additional 2L at 05:00 tomorrow  - NPO at MN except medication for colonoscopy in AM    Recommendations discussed with primary team  Case discussed with attending physician

## 2020-11-09 NOTE — H&P ADULT - PROBLEM SELECTOR PLAN 4
On Olmesartan 20mg qd at home On Olmesartan 20mg qd at home  - c/w home meds s/p resection in 2003  - continue to monitor

## 2020-11-09 NOTE — H&P ADULT - PROBLEM SELECTOR PLAN 6
DVT ppx: Hep SQ 5000u Q8h  GI ppx: none    Dispo:  Code: DVT ppx: SCDs, holding pharmacological for c-scope  GI ppx: none    Dispo: [facility]  Code: FULL F: none  E: replete PRN  N: Regular, NPO after midnight for c-scope

## 2020-11-09 NOTE — H&P ADULT - PROBLEM SELECTOR PLAN 7
DVT ppx: SCDs, holding pharmacological for c-scope  GI ppx: none    Dispo: [facility]  Code: FULL

## 2020-11-09 NOTE — H&P ADULT - NSHPLABSRESULTS_GEN_ALL_CORE
LABS:              CAPILLARY BLOOD GLUCOSE          RADIOLOGY & ADDITIONAL TESTS: Reviewed. LABS:                        11.7   5.65  )-----------( 181      ( 09 Nov 2020 16:53 )             35.7     11-09    132<L>  |  97  |  19  ----------------------------<  97  4.9   |  28  |  0.48<L>    Ca    8.9      09 Nov 2020 16:53  Phos  3.3     11-09  Mg     2.2     11-09    TPro  6.0  /  Alb  3.7  /  TBili  <0.2  /  DBili  x   /  AST  20  /  ALT  14  /  AlkPhos  38<L>  11-09      PT/INR - ( 09 Nov 2020 16:53 )   PT: 10.6 sec;   INR: 0.88          PTT - ( 09 Nov 2020 16:53 )  PTT:29.4 sec    CAPILLARY BLOOD GLUCOSE    RADIOLOGY & ADDITIONAL TESTS: Reviewed.

## 2020-11-09 NOTE — H&P ADULT - HISTORY OF PRESENT ILLNESS
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 As per daughter, patient had a positive cologuard test as outpatient in Nov/Dec 2019, and has been trying to do a colonoscopy since February 2020. In February 2020, she prepped for c-scope and had cluster seizures which led to her procedure being postponed. She is sent in by Dr. Najjar and Dr. Hidalgo for monitoring while being prepped for colonoscopy as inpatient. Daughter reports the seizure episodes as staring into space with some memory loss, and sometimes associated with urinary incontinence.     Reports >20lbs weight loss for the past year. Currently without any acute complaints, denies any headaches, dizziness, lightheadedness, chest pain, palpitations, SOB, melena, hematochezia, abdominal pain, diarrhea, constipation. 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 As per daughter, patient had a positive cologuard test as outpatient in Nov/Dec 2019, and has been trying to do a colonoscopy since February 2020. In February 2020, she prepped for c-scope and had cluster seizures which led to her procedure being postponed. She is sent in by Dr. Najjar and Dr. Hidalgo for monitoring while being prepped for colonoscopy as inpatient. Daughter reports the seizure episodes as staring into space with some memory loss, and sometimes associated with urinary incontinence.     Reports >20lbs weight loss for the past year, no night sweats. Denies any headaches, dizziness, lightheadedness, chest pain, palpitations, SOB, melena, hematochezia, abdominal pain, diarrhea, constipation.

## 2020-11-09 NOTE — H&P ADULT - PROBLEM SELECTOR PLAN 2
Patient had a positive cologuard test as outpatient in Nov/Dec 2019, and has been trying to do a colonoscopy since February 2020. In February 2020, she prepped for c-scope and had cluster seizures which led to her procedure being postponed. Currently denies any melena, hematochezia  - GI consulted Patient had a positive cologuard test as outpatient in Nov/Dec 2019, and has been trying to do a colonoscopy since February 2020. In February 2020, she prepped for c-scope and had cluster seizures which led to her procedure being postponed. Reports about 20lbs weight loss over the past year. Currently denies any melena, hematochezia  - GI consulted  - plan for c-scope tomorrow  - NPO after midnight  - Golytely split-dose prep Patient had a positive cologuard test as outpatient in Nov/Dec 2019, and has been trying to do a colonoscopy since February 2020. In February 2020, she prepped for c-scope and had cluster seizures which led to her procedure being postponed. Reports about 20lbs weight loss over the past year. Currently denies any melena, hematochezia  - GI consulted  - plan for c-scope tomorrow  - NPO after midnight  - Golytely split-dose prep  continue vEEG, and can be sent with battery pack tomorrow.

## 2020-11-09 NOTE — H&P ADULT - NSICDXPASTMEDICALHX_GEN_ALL_CORE_FT
PAST MEDICAL HISTORY:  Breast cancer, stage 1     Epilepsy     History of brain tumor     HTN (hypertension)

## 2020-11-09 NOTE — H&P ADULT - PROBLEM SELECTOR PLAN 5
F:   E: replete PRN  N: DASH/TLC F: none  E: replete PRN  N: Regular, NPO after midnight for c-scope On Olmesartan 20mg qd at home  - c/w home meds

## 2020-11-09 NOTE — H&P ADULT - ASSESSMENT
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

## 2020-11-09 NOTE — H&P ADULT - PROBLEM SELECTOR PLAN 1
Hx of epilepsy since age 32 as per daughter, Hx of epilepsy since age 32 as per daughter, has a hx of brain tumor, s/p resection Hx of epilepsy since age 32 as per daughter, brain tumor s/p resection in 2003. On Depakote 750mg qAM, 500mg qPM and Carbamazepine 300mg TID at home  - vEEG monitoring  - c/w home meds  - Ativan 2mg IVP PRN for seizures > 2mins  - f/u epilepsy recs

## 2020-11-09 NOTE — CONSULT NOTE ADULT - SUBJECTIVE AND OBJECTIVE BOX
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 As per daughter, patient had a positive cologuard test as outpatient in Nov/Dec 2019, and has been trying to do a colonoscopy since February 2020. In February 2020, she prepped for c-scope and had cluster seizures which led to her procedure being postponed. She is sent in by Dr. Najjar and Dr. Hidalgo for monitoring while being prepped for colonoscopy as inpatient. Daughter reports the seizure episodes as staring into space with some memory loss, and sometimes associated with urinary incontinence.     Reports >20lbs weight loss for the past year, no night sweats. Denies any headaches, dizziness, lightheadedness, chest pain, palpitations, SOB, melena, hematochezia, abdominal pain, diarrhea, constipation.  (09 Nov 2020 14:15)    Allergies    Bactrim (Unknown)  Dilantin (Hives)  MSG (Unknown)  phenobarbital (Unknown)  Zonegran (Other)    Intolerances    antihistamines (Unknown)  Keppra (Unknown)  Neurontin (Unknown)  Topamax (Sedation/Somnol)  Trileptal (Nausea)    Home Medications:  carBAMazepine 300 mg oral capsule, extended release: 2 cap(s) orally 3 times a day (09 Nov 2020 15:21)  Depakote 250 mg oral delayed release tablet: 3 tab(s) orally once a day (09 Nov 2020 15:21)  Depakote 250 mg oral delayed release tablet: 2 tab(s) orally once a day (at bedtime) (09 Nov 2020 15:21)  olmesartan 20 mg oral tablet: 1 tab(s) orally once a day (09 Nov 2020 15:21)    MEDICATIONS:  MEDICATIONS  (STANDING):  carBAMazepine ER Capsule 300 milliGRAM(s) Oral <User Schedule>  diVALproex  milliGRAM(s) Oral at bedtime  olmesartan 20 milliGRAM(s) Oral daily  polyethylene glycol/electrolyte Solution. 2000 milliLiter(s) Oral once    MEDICATIONS  (PRN):  LORazepam   Injectable 2 milliGRAM(s) IV Push once PRN Seizure activity    PAST MEDICAL & SURGICAL HISTORY:  HTN (hypertension)    Epilepsy    History of brain tumor    Breast cancer, stage 1    H/O excision of tumor of brain meninges    S/P breast lumpectomy      FAMILY HISTORY:  No pertinent family history in first degree relatives      SOCIAL HISTORY:  As above    REVIEW OF SYSTEMS:  CONSTITUTIONAL: No weakness, fevers or chills  HEENT: No visual changes; No vertigo or throat pain   NECK: No pain or stiffness  RESPIRATORY: No cough, wheezing, hemoptysis; No shortness of breath  CARDIOVASCULAR: No chest pain or palpitations  GASTROINTESTINAL: No abdominal or epigastric pain. No nausea, vomiting, or hematemesis; No diarrhea or constipation. No melena or hematochezia.  GENITOURINARY: No dysuria, frequency or hematuria  NEUROLOGICAL: No numbness or weakness  SKIN: No itching, burning, rashes, or lesions   All other 10 review of systems is negative unless indicated above.    Vital Signs Last 24 Hrs  T(C): 36.4 (09 Nov 2020 14:39), Max: 36.4 (09 Nov 2020 14:39)  T(F): 97.6 (09 Nov 2020 14:39), Max: 97.6 (09 Nov 2020 14:39)  HR: 79 (09 Nov 2020 14:39) (79 - 79)  BP: 125/77 (09 Nov 2020 14:39) (125/77 - 125/77)  BP(mean): --  RR: 20 (09 Nov 2020 14:39) (20 - 20)  SpO2: 99% (09 Nov 2020 14:39) (99% - 99%)      PHYSICAL EXAM:    General: Elderly, Well developed; well nourished; in no acute distress  Eyes: Anicteric sclerae, moist conjunctivae  HENT: Moist mucous membranes  Neck: Trachea midline, supple  Lungs: Normal respiratory effort, no intercostal retractions  Cardiovascular: RRR  Abdomen: Soft, non-tender non-distended; Normal bowel sounds; No rebound or guarding  Extremities: Normal range of motion, No clubbing, cyanosis or edema  Neurological: Alert and oriented x3  Skin: Warm and dry. No obvious rash    LABS:    Pending              RADIOLOGY & ADDITIONAL STUDIES:     Reviewed

## 2020-11-09 NOTE — CONSULT NOTE ADULT - ATTENDING COMMENTS
Hx of positive Cologuard test.  Admission to monitor for seizure activity during bowel preparation  For colonoscopy tomorrow.

## 2020-11-09 NOTE — H&P ADULT - PROBLEM SELECTOR PLAN 3
s/p resection in 2003  - continue to monitor Mild hyponatremia, Na 132. likely in the setting of dehydration, patient dry on exam.   - will hold off on further workup for now, unless Na downtrends

## 2020-11-10 ENCOUNTER — RESULT REVIEW (OUTPATIENT)
Age: 75
End: 2020-11-10

## 2020-11-10 LAB
HCV AB S/CO SERPL IA: 0.34 S/CO — SIGNIFICANT CHANGE UP
HCV AB SERPL-IMP: SIGNIFICANT CHANGE UP

## 2020-11-10 PROCEDURE — 88305 TISSUE EXAM BY PATHOLOGIST: CPT | Mod: 26

## 2020-11-10 PROCEDURE — 99232 SBSQ HOSP IP/OBS MODERATE 35: CPT

## 2020-11-10 PROCEDURE — 45385 COLONOSCOPY W/LESION REMOVAL: CPT

## 2020-11-10 PROCEDURE — 95720 EEG PHY/QHP EA INCR W/VEEG: CPT

## 2020-11-10 PROCEDURE — 45380 COLONOSCOPY AND BIOPSY: CPT | Mod: 59

## 2020-11-10 RX ADMIN — DIVALPROEX SODIUM 500 MILLIGRAM(S): 500 TABLET, DELAYED RELEASE ORAL at 21:55

## 2020-11-10 RX ADMIN — Medication 600 MILLIGRAM(S): at 17:19

## 2020-11-10 RX ADMIN — Medication 600 MILLIGRAM(S): at 07:42

## 2020-11-10 RX ADMIN — DIVALPROEX SODIUM 750 MILLIGRAM(S): 500 TABLET, DELAYED RELEASE ORAL at 11:21

## 2020-11-10 RX ADMIN — Medication 600 MILLIGRAM(S): at 19:43

## 2020-11-10 RX ADMIN — OLMESARTAN MEDOXOMIL 20 MILLIGRAM(S): 5 TABLET, FILM COATED ORAL at 05:13

## 2020-11-10 RX ADMIN — Medication 2000 MILLILITER(S): at 05:13

## 2020-11-10 NOTE — PROGRESS NOTE ADULT - SUBJECTIVE AND OBJECTIVE BOX
CC: Patient is a 75y old  Female who presents with a chief complaint of     OVERNIGHT EVENTS: SAFIA.    SUBJECTIVE / INTERVAL HPI: Patient seen and examined at bedside. Patient states she couldn't sleep 2/2 noise around, and that she is having a lot of BM currently 2/2 her bowelprep.  Denies CP, fevers, chills, headaches, SOB, palpitations, N/V, abdominal pain, numbness, tingling.    ROS: negative unless otherwise stated above.    VITAL SIGNS:  Vital Signs Last 24 Hrs  T(C): 36.3 (10 Nov 2020 11:19), Max: 36.6 (09 Nov 2020 21:32)  T(F): 97.4 (10 Nov 2020 11:19), Max: 97.9 (09 Nov 2020 21:32)  HR: 80 (10 Nov 2020 11:19) (55 - 91)  BP: 130/81 (10 Nov 2020 11:19) (125/69 - 131/70)  BP(mean): --  RR: 18 (10 Nov 2020 11:19) (18 - 20)  SpO2: 99% (10 Nov 2020 11:19) (99% - 99%)    PHYSICAL EXAM:    General: elderly, in NAD, lying comfortably in bed  HEENT: NC/AT; PERRL, anicteric sclera; MMM  Neck: supple  Cardiovascular: +S1/S2; RRR, no murmurs appreciated  Respiratory: CTA B/L; no W/R/R appreciated  Gastrointestinal: soft, NT/ND; +BSx4  Extremities: WWP; no edema, clubbing or cyanosis  Vascular: 2+ radial, DP/PT pulses B/L  Neurological: AAOx3; no focal deficits    MEDICATIONS:  MEDICATIONS  (STANDING):  carBAMazepine ER Capsule 600 milliGRAM(s) Oral <User Schedule>  diVALproex  milliGRAM(s) Oral daily  diVALproex  milliGRAM(s) Oral at bedtime  olmesartan 20 milliGRAM(s) Oral daily    MEDICATIONS  (PRN):  LORazepam   Injectable 2 milliGRAM(s) IV Push once PRN Seizure activity      ALLERGIES:  Allergies    Bactrim (Unknown)  Dilantin (Hives)  MSG (Unknown)  phenobarbital (Unknown)  Zonegran (Other)    Intolerances    antihistamines (Unknown)  Keppra (Unknown)  Neurontin (Unknown)  Topamax (Sedation/Somnol)  Trileptal (Nausea)      LABS:                        11.7   5.65  )-----------( 181      ( 09 Nov 2020 16:53 )             35.7     11-09    132<L>  |  97  |  19  ----------------------------<  97  4.9   |  28  |  0.48<L>    Ca    8.9      09 Nov 2020 16:53  Phos  3.3     11-09  Mg     2.2     11-09    TPro  6.0  /  Alb  3.7  /  TBili  <0.2  /  DBili  x   /  AST  20  /  ALT  14  /  AlkPhos  38<L>  11-09    PT/INR - ( 09 Nov 2020 16:53 )   PT: 10.6 sec;   INR: 0.88          PTT - ( 09 Nov 2020 16:53 )  PTT:29.4 sec    CAPILLARY BLOOD GLUCOSE          RADIOLOGY & ADDITIONAL TESTS: Reviewed. CC: Patient is a 75y old  Female who presents with a chief complaint of seizures, that has been exacerbated by colonoscopy in the past.    OVERNIGHT EVENTS: SAFIA.    SUBJECTIVE / INTERVAL HPI: Patient seen and examined at bedside. Patient states she couldn't sleep 2/2 noise around, and that she is having a lot of BM currently 2/2 her bowelprep.  Denies CP, fevers, chills, headaches, SOB, palpitations, N/V, abdominal pain, numbness, tingling.    ROS: negative unless otherwise stated above.    VITAL SIGNS:  Vital Signs Last 24 Hrs  T(C): 36.3 (10 Nov 2020 11:19), Max: 36.6 (09 Nov 2020 21:32)  T(F): 97.4 (10 Nov 2020 11:19), Max: 97.9 (09 Nov 2020 21:32)  HR: 80 (10 Nov 2020 11:19) (55 - 91)  BP: 130/81 (10 Nov 2020 11:19) (125/69 - 131/70)  BP(mean): --  RR: 18 (10 Nov 2020 11:19) (18 - 20)  SpO2: 99% (10 Nov 2020 11:19) (99% - 99%)    PHYSICAL EXAM:    General: elderly, in NAD, lying comfortably in bed  HEENT: NC/AT; PERRL, anicteric sclera; MMM  Neck: supple  Cardiovascular: +S1/S2; RRR, no murmurs appreciated  Respiratory: CTA B/L; no W/R/R appreciated  Gastrointestinal: soft, NT/ND; +BSx4  Extremities: WWP; no edema, clubbing or cyanosis  Vascular: 2+ radial, DP/PT pulses B/L  Neurological: AAOx3; no focal deficits    MEDICATIONS:  MEDICATIONS  (STANDING):  carBAMazepine ER Capsule 600 milliGRAM(s) Oral <User Schedule>  diVALproex  milliGRAM(s) Oral daily  diVALproex  milliGRAM(s) Oral at bedtime  olmesartan 20 milliGRAM(s) Oral daily    MEDICATIONS  (PRN):  LORazepam   Injectable 2 milliGRAM(s) IV Push once PRN Seizure activity      ALLERGIES:  Allergies    Bactrim (Unknown)  Dilantin (Hives)  MSG (Unknown)  phenobarbital (Unknown)  Zonegran (Other)    Intolerances    antihistamines (Unknown)  Keppra (Unknown)  Neurontin (Unknown)  Topamax (Sedation/Somnol)  Trileptal (Nausea)      LABS:                        11.7   5.65  )-----------( 181      ( 09 Nov 2020 16:53 )             35.7     11-09    132<L>  |  97  |  19  ----------------------------<  97  4.9   |  28  |  0.48<L>    Ca    8.9      09 Nov 2020 16:53  Phos  3.3     11-09  Mg     2.2     11-09    TPro  6.0  /  Alb  3.7  /  TBili  <0.2  /  DBili  x   /  AST  20  /  ALT  14  /  AlkPhos  38<L>  11-09    PT/INR - ( 09 Nov 2020 16:53 )   PT: 10.6 sec;   INR: 0.88          PTT - ( 09 Nov 2020 16:53 )  PTT:29.4 sec    CAPILLARY BLOOD GLUCOSE          RADIOLOGY & ADDITIONAL TESTS: Reviewed.

## 2020-11-10 NOTE — PROGRESS NOTE ADULT - ASSESSMENT
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

## 2020-11-10 NOTE — PROGRESS NOTE ADULT - PROBLEM SELECTOR PLAN 3
Mild hyponatremia, Na 132. likely in the setting of dehydration, patient dry on exam.   - will hold off on further workup for now, unless Na downtrends

## 2020-11-10 NOTE — PROGRESS NOTE ADULT - PROBLEM SELECTOR PLAN 1
Hx of epilepsy since age 32 as per daughter, brain tumor s/p resection in 2003. On Depakote 750mg qAM, 500mg qPM and Carbamazepine 300mg TID at home  - vEEG monitoring  - c/w home meds  - Ativan 2mg IVP PRN for seizures > 2mins  - f/u epilepsy recs  - EEG report pending Hx of epilepsy since age 32 as per daughter, brain tumor s/p resection in 2003. On Depakote 750mg qAM, 500mg qPM and Carbamazepine 300mg TID at home  - vEEG monitoring  - c/w home meds  - Ativan 2mg IVP PRN for seizures > 2mins  - f/u epilepsy recs  - EEG report pending  - likely d/c tomorrow.

## 2020-11-10 NOTE — CHART NOTE - NSCHARTNOTEFT_GEN_A_CORE
Patient had diagnostic colonoscopy for positive Cologuard in endoscopy suite today. Six diminutive polyps seen throughout the colon removed with cold biopsy forceps and cold snare polypectomy. Mild sigmoid diverticulosis. Small internal hemorrhoids.    RECOMMENDATIONS  - Follow-up pathology  - Resume high-fiber diet  - Patient should have repeat colonoscopy in 3 years for surveillance Patient had diagnostic colonoscopy for positive Cologuard in endoscopy suite today. Six diminutive polyps seen throughout the colon removed with cold biopsy forceps and cold snare polypectomy. Mild sigmoid diverticulosis. Small internal hemorrhoids.    RECOMMENDATIONS  - Follow-up pathology  - Resume high-fiber diet  - Patient should have repeat colonoscopy in 3 years for surveillance  - Please recall as needed with any gastroenterological questions

## 2020-11-10 NOTE — PROGRESS NOTE ADULT - PROBLEM SELECTOR PLAN 2
Patient had a positive cologuard test as outpatient in Nov/Dec 2019, and has been trying to do a colonoscopy since February 2020. In February 2020, she prepped for c-scope and had cluster seizures which led to her procedure being postponed. Reports about 20lbs weight loss over the past year. Currently denies any melena, hematochezia  - GI consulted  - plan for c-scope tomorrow  - NPO after midnight  - Golytely split-dose prep for colonoscopy 11/10  - continue vEEG, and can be sent with battery pack tomorrow.

## 2020-11-10 NOTE — PROGRESS NOTE ADULT - PROBLEM SELECTOR PLAN 7
DVT ppx: SCDs, holding pharmacological for c-scope  GI ppx: none    Dispo: [facility]  Code: FULL

## 2020-11-11 ENCOUNTER — TRANSCRIPTION ENCOUNTER (OUTPATIENT)
Age: 75
End: 2020-11-11

## 2020-11-11 VITALS
DIASTOLIC BLOOD PRESSURE: 72 MMHG | HEART RATE: 85 BPM | SYSTOLIC BLOOD PRESSURE: 119 MMHG | TEMPERATURE: 98 F | RESPIRATION RATE: 18 BRPM | OXYGEN SATURATION: 98 %

## 2020-11-11 LAB
ANION GAP SERPL CALC-SCNC: 10 MMOL/L — SIGNIFICANT CHANGE UP (ref 5–17)
BUN SERPL-MCNC: 13 MG/DL — SIGNIFICANT CHANGE UP (ref 7–23)
CALCIUM SERPL-MCNC: 8.3 MG/DL — LOW (ref 8.4–10.5)
CHLORIDE SERPL-SCNC: 102 MMOL/L — SIGNIFICANT CHANGE UP (ref 96–108)
CO2 SERPL-SCNC: 24 MMOL/L — SIGNIFICANT CHANGE UP (ref 22–31)
CREAT SERPL-MCNC: 0.55 MG/DL — SIGNIFICANT CHANGE UP (ref 0.5–1.3)
GLUCOSE SERPL-MCNC: 82 MG/DL — SIGNIFICANT CHANGE UP (ref 70–99)
HCT VFR BLD CALC: 38.7 % — SIGNIFICANT CHANGE UP (ref 34.5–45)
HGB BLD-MCNC: 12.5 G/DL — SIGNIFICANT CHANGE UP (ref 11.5–15.5)
MAGNESIUM SERPL-MCNC: 2.1 MG/DL — SIGNIFICANT CHANGE UP (ref 1.6–2.6)
MCHC RBC-ENTMCNC: 30 PG — SIGNIFICANT CHANGE UP (ref 27–34)
MCHC RBC-ENTMCNC: 32.3 GM/DL — SIGNIFICANT CHANGE UP (ref 32–36)
MCV RBC AUTO: 93 FL — SIGNIFICANT CHANGE UP (ref 80–100)
NRBC # BLD: 0 /100 WBCS — SIGNIFICANT CHANGE UP (ref 0–0)
PHOSPHATE SERPL-MCNC: 2.5 MG/DL — SIGNIFICANT CHANGE UP (ref 2.5–4.5)
PLATELET # BLD AUTO: 193 K/UL — SIGNIFICANT CHANGE UP (ref 150–400)
POTASSIUM SERPL-MCNC: 4.9 MMOL/L — SIGNIFICANT CHANGE UP (ref 3.5–5.3)
POTASSIUM SERPL-SCNC: 4.9 MMOL/L — SIGNIFICANT CHANGE UP (ref 3.5–5.3)
RBC # BLD: 4.16 M/UL — SIGNIFICANT CHANGE UP (ref 3.8–5.2)
RBC # FLD: 13.3 % — SIGNIFICANT CHANGE UP (ref 10.3–14.5)
SODIUM SERPL-SCNC: 136 MMOL/L — SIGNIFICANT CHANGE UP (ref 135–145)
SURGICAL PATHOLOGY STUDY: SIGNIFICANT CHANGE UP
WBC # BLD: 5.6 K/UL — SIGNIFICANT CHANGE UP (ref 3.8–10.5)
WBC # FLD AUTO: 5.6 K/UL — SIGNIFICANT CHANGE UP (ref 3.8–10.5)

## 2020-11-11 PROCEDURE — 85610 PROTHROMBIN TIME: CPT

## 2020-11-11 PROCEDURE — 85025 COMPLETE CBC W/AUTO DIFF WBC: CPT

## 2020-11-11 PROCEDURE — 86803 HEPATITIS C AB TEST: CPT

## 2020-11-11 PROCEDURE — 86850 RBC ANTIBODY SCREEN: CPT

## 2020-11-11 PROCEDURE — 84100 ASSAY OF PHOSPHORUS: CPT

## 2020-11-11 PROCEDURE — 99238 HOSP IP/OBS DSCHRG MGMT 30/<: CPT

## 2020-11-11 PROCEDURE — 95716 VEEG EA 12-26HR CONT MNTR: CPT

## 2020-11-11 PROCEDURE — 86901 BLOOD TYPING SEROLOGIC RH(D): CPT

## 2020-11-11 PROCEDURE — 85027 COMPLETE CBC AUTOMATED: CPT

## 2020-11-11 PROCEDURE — 36415 COLL VENOUS BLD VENIPUNCTURE: CPT

## 2020-11-11 PROCEDURE — 95720 EEG PHY/QHP EA INCR W/VEEG: CPT

## 2020-11-11 PROCEDURE — 85730 THROMBOPLASTIN TIME PARTIAL: CPT

## 2020-11-11 PROCEDURE — 95700 EEG CONT REC W/VID EEG TECH: CPT

## 2020-11-11 PROCEDURE — 86900 BLOOD TYPING SEROLOGIC ABO: CPT

## 2020-11-11 PROCEDURE — 80048 BASIC METABOLIC PNL TOTAL CA: CPT

## 2020-11-11 PROCEDURE — 83735 ASSAY OF MAGNESIUM: CPT

## 2020-11-11 PROCEDURE — 80053 COMPREHEN METABOLIC PANEL: CPT

## 2020-11-11 PROCEDURE — 88305 TISSUE EXAM BY PATHOLOGIST: CPT

## 2020-11-11 RX ADMIN — DIVALPROEX SODIUM 750 MILLIGRAM(S): 500 TABLET, DELAYED RELEASE ORAL at 11:48

## 2020-11-11 RX ADMIN — Medication 600 MILLIGRAM(S): at 07:07

## 2020-11-11 RX ADMIN — OLMESARTAN MEDOXOMIL 20 MILLIGRAM(S): 5 TABLET, FILM COATED ORAL at 07:07

## 2020-11-11 NOTE — DISCHARGE NOTE PROVIDER - NSDCFUADDAPPT_GEN_ALL_CORE_FT
Please follow up with your gastroenterologist to discuss the results of your pathology report.    Please follow up with your neurologist for further management of your epilepsy.

## 2020-11-11 NOTE — DISCHARGE NOTE PROVIDER - NSDCMRMEDTOKEN_GEN_ALL_CORE_FT
carBAMazepine 300 mg oral capsule, extended release: 2 cap(s) orally 3 times a day  Depakote 250 mg oral delayed release tablet: 3 tab(s) orally once a day  Depakote 250 mg oral delayed release tablet: 2 tab(s) orally once a day (at bedtime)  olmesartan 20 mg oral tablet: 1 tab(s) orally once a day

## 2020-11-11 NOTE — DISCHARGE NOTE PROVIDER - HOSPITAL COURSE
#Discharge: do not delete    Ms. 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    Problem List/Inpatient treatment course:     #Epilepsy:  - Hx of epilepsy since age 32 as per daughter, brain tumor s/p resection in 2003. On Depakote 750mg qAM, 500mg qPM and Carbamazepine 300mg TID at home  - vEEG monitoring during admission showed no seizures  - f/u outpatient w/ neurologist    #Abnormal stool test:  - Patient had a positive cologuard test as outpatient in Nov/Dec 2019, and has been trying to do a colonoscopy since February 2020. In February 2020, she prepped for c-scope and had cluster seizures which led to her procedure being postponed. Reports about 20lbs weight loss over the past year. Currently denies any melena, hematochezia  - colonoscopy: hyperplastic polyp x1, multiple tubular adenomas  - f/u outpatient w/ GI for pathology report  - repeat colonoscopy in 3 years for surveillance    #Hyponatremia  - Mild hyponatremia on admission, Na 132. likely in the setting of dehydration, patient dry on exam.   - will hold off on further workup for now, unless Na downtrends    #History of brain tumor:  - s/p resection in 2003  - stable, continue to monitor outpatient.     #Hypertension:  - On Olmesartan 20mg qd at home  - c/w home meds.     New medications: none  Labs to be followed outpatient: none  Exam to be followed outpatient: none

## 2020-11-11 NOTE — DISCHARGE NOTE PROVIDER - NSDCCPCAREPLAN_GEN_ALL_CORE_FT
PRINCIPAL DISCHARGE DIAGNOSIS  Diagnosis: S/P colonoscopy  Assessment and Plan of Treatment: You were admitted for having a colonoscopy while being on video EEG monitoring. This was done because you had a seizure on a prior attempt of doing a colonoscopy. The colonoscopy was performed without any complications. A few samples were taken of polyps and adenomas (growths in the colon which are most likely benign). The final pathology report of these samples is still pending; please follow up with your gastroenterologist to discuss the results.

## 2020-11-11 NOTE — DISCHARGE NOTE PROVIDER - NSDCQMSTROKE_NEU_ALL_CORE
This patient was referred for audiometric/tympanometric testing by Dr. Cata Andersen due to ear infections. Tympanometry revealed essentially flat tympanograms, bilaterally. The results were reviewed with the patient's parent and Dr. Cata Andersen. Recommendations for follow up will be made pending physician consult. [facility], [facility]  [address] # [id number]    Electronically signed by JOHN Avitia on 4/26/2019 at 10:34 AM
No

## 2020-11-11 NOTE — DISCHARGE NOTE PROVIDER - CARE PROVIDER_API CALL
Najjar, Souhel (MD)  Neurology  [address], [address] [place] [address]  Phone: [phone]  Fax: [phone]  Established Patient  Follow Up Time: 1 month

## 2020-11-11 NOTE — DISCHARGE NOTE NURSING/CASE MANAGEMENT/SOCIAL WORK - PATIENT PORTAL LINK FT
You can access the FollowMyHealth Patient Portal offered by [facility] by registering at the following website: [email or web address]. By joining [facility]’s FollowMyHealth portal, you will also be able to view your health information using other applications (apps) compatible with our system.

## 2020-11-15 DIAGNOSIS — E86.0 DEHYDRATION: ICD-10-CM

## 2020-11-15 DIAGNOSIS — E87.1 HYPO-OSMOLALITY AND HYPONATREMIA: ICD-10-CM

## 2020-11-15 DIAGNOSIS — G40.909 EPILEPSY, UNSPECIFIED, NOT INTRACTABLE, WITHOUT STATUS EPILEPTICUS: ICD-10-CM

## 2020-11-15 DIAGNOSIS — Z92.3 PERSONAL HISTORY OF IRRADIATION: ICD-10-CM

## 2020-11-15 DIAGNOSIS — K57.30 DIVERTICULOSIS OF LARGE INTESTINE WITHOUT PERFORATION OR ABSCESS WITHOUT BLEEDING: ICD-10-CM

## 2020-11-15 DIAGNOSIS — I10 ESSENTIAL (PRIMARY) HYPERTENSION: ICD-10-CM

## 2020-11-15 DIAGNOSIS — D12.5 BENIGN NEOPLASM OF SIGMOID COLON: ICD-10-CM

## 2020-11-15 DIAGNOSIS — D12.0 BENIGN NEOPLASM OF CECUM: ICD-10-CM

## 2020-11-15 DIAGNOSIS — K64.8 OTHER HEMORRHOIDS: ICD-10-CM

## 2020-11-15 DIAGNOSIS — D12.2 BENIGN NEOPLASM OF ASCENDING COLON: ICD-10-CM

## 2020-11-15 DIAGNOSIS — Z85.3 PERSONAL HISTORY OF MALIGNANT NEOPLASM OF BREAST: ICD-10-CM

## 2020-11-21 PROBLEM — C50.919 MALIGNANT NEOPLASM OF UNSPECIFIED SITE OF UNSPECIFIED FEMALE BREAST: Chronic | Status: ACTIVE | Noted: 2020-11-09

## 2020-11-21 PROBLEM — I10 ESSENTIAL (PRIMARY) HYPERTENSION: Chronic | Status: ACTIVE | Noted: 2020-11-09

## 2020-11-21 PROBLEM — G40.909 EPILEPSY, UNSPECIFIED, NOT INTRACTABLE, WITHOUT STATUS EPILEPTICUS: Chronic | Status: ACTIVE | Noted: 2020-11-09

## 2020-11-21 PROBLEM — Z87.898 PERSONAL HISTORY OF OTHER SPECIFIED CONDITIONS: Chronic | Status: ACTIVE | Noted: 2020-11-09

## 2021-01-12 ENCOUNTER — APPOINTMENT (OUTPATIENT)
Dept: NEUROLOGY | Facility: CLINIC | Age: 76
End: 2021-01-12
Payer: SELF-PAY

## 2021-01-12 VITALS
BODY MASS INDEX: 25.52 KG/M2 | HEIGHT: 65.5 IN | WEIGHT: 155 LBS | HEART RATE: 79 BPM | DIASTOLIC BLOOD PRESSURE: 80 MMHG | SYSTOLIC BLOOD PRESSURE: 136 MMHG

## 2021-01-12 VITALS — TEMPERATURE: 95.6 F

## 2021-01-12 PROCEDURE — 99213 OFFICE O/P EST LOW 20 MIN: CPT

## 2021-01-12 NOTE — HISTORY OF PRESENT ILLNESS
[FreeTextEntry1] : Kimberly presents today for a follow up visit of Epilepsy s/p right frontotemporal ablation in 2003. \par \par Last seizure was in Feb 2020 while prepping over night for an outpatient colonoscopy. No further events since then. Recently had a brain MRI that was suspicious but had a PET scan that did not show signs of malignancy.\par naheed Was admitted to  EMU for colonoscopy with prep in 11/2020. Patient did very well. Daughter reports episodes of bed wetting that did not have EEG correlate. \par \par At this time patient and daughter deny any seizure, seizure-like activity, LOC, lapses in time, tongue biting, staring spells or urinary incontinence. She denies any new neurological complaints at this time. \par \par Doing well from a cognitive standpoint. No issues with sleep. Had NPT in 10/2019 and consistent with mild neurocognitive disorder that is multifactorial. See full report in chart. \par \par Left breast  CA managed by oncologist. In summary, was dx with left breast CA in 2019. Treated with left lumpectomy and radiation in 10/2019. \par \par Current medication regimen: \par Carbatrol 600mg TID\par VPA: 750-500mg

## 2021-01-12 NOTE — DISCUSSION/SUMMARY
[FreeTextEntry1] : Patient with epilepsy. \par \par Doing well at this time. Last sz was in 02/2020 while prepping for colonoscopy. No further seizure or seizure like activity noted. Cognitively stable. At this time we recommend the following: \par \par 1. Continue Carbatrol 600mg BID\par 2. Continue -500mg\par 3. Serology as ordered. \par 4. RTC in 6 months if stable\par \par All questions and concerns were addressed. Emotional support was rendered.

## 2021-01-12 NOTE — PHYSICAL EXAM
[General Appearance - Alert] : alert [General Appearance - In No Acute Distress] : in no acute distress [Oriented To Time, Place, And Person] : oriented to person, place, and time [Impaired Insight] : insight and judgment were intact [Affect] : the affect was normal [Person] : oriented to person [Place] : oriented to place [Time] : oriented to time [Concentration Intact] : normal concentrating ability [Visual Intact] : visual attention was ~T not ~L decreased [Naming Objects] : no difficulty naming common objects [Writing A Sentence] : no difficulty writing a sentence [Repeating Phrases] : no difficulty repeating a phrase [Fluency] : fluency intact [Comprehension] : comprehension intact [Reading] : reading intact [Past History] : adequate knowledge of personal past history [Cranial Nerves Optic (II)] : visual acuity intact bilaterally,  visual fields full to confrontation, pupils equal round and reactive to light [Cranial Nerves Trigeminal (V)] : facial sensation intact symmetrically [Cranial Nerves Oculomotor (III)] : extraocular motion intact [Cranial Nerves Facial (VII)] : face symmetrical [Cranial Nerves Vestibulocochlear (VIII)] : hearing was intact bilaterally [Cranial Nerves Glossopharyngeal (IX)] : tongue and palate midline [Cranial Nerves Accessory (XI - Cranial And Spinal)] : head turning and shoulder shrug symmetric [Cranial Nerves Hypoglossal (XII)] : there was no tongue deviation with protrusion [Motor Tone] : muscle tone was normal in all four extremities [Motor Strength] : muscle strength was normal in all four extremities [No Muscle Atrophy] : normal bulk in all four extremities [Sensation Tactile Decrease] : light touch was intact [Abnormal Walk] : normal gait [Balance] : balance was intact [2+] : Ankle jerk left 2+ [Motor Strength Upper Extremities Bilaterally] : strength was normal in both upper extremities [Motor Strength Lower Extremities Bilaterally] : strength was normal in both lower extremities [Past-pointing] : there was no past-pointing [Plantar Reflex Right Only] : normal on the right [Plantar Reflex Left Only] : normal on the left [FreeTextEntry5] : B/L INTENTION TREMOR NOTED ON F-N EXAM WITH IMPAIRED FINE MOTOR SKILL.  [FreeTextEntry8] : FINE TREMOR TO THE B/L UE. NOT PROGRESSIVE

## 2021-02-25 ENCOUNTER — FORM ENCOUNTER (OUTPATIENT)
Age: 76
End: 2021-02-25

## 2021-06-17 ENCOUNTER — NON-APPOINTMENT (OUTPATIENT)
Age: 76
End: 2021-06-17

## 2021-11-10 ENCOUNTER — NON-APPOINTMENT (OUTPATIENT)
Age: 76
End: 2021-11-10

## 2021-12-30 ENCOUNTER — APPOINTMENT (OUTPATIENT)
Dept: NEUROLOGY | Facility: CLINIC | Age: 76
End: 2021-12-30

## 2022-01-05 ENCOUNTER — APPOINTMENT (OUTPATIENT)
Dept: NEUROLOGY | Facility: CLINIC | Age: 77
End: 2022-01-05
Payer: MEDICARE

## 2022-01-05 PROCEDURE — 95819 EEG AWAKE AND ASLEEP: CPT

## 2022-01-06 ENCOUNTER — FORM ENCOUNTER (OUTPATIENT)
Age: 77
End: 2022-01-06

## 2022-01-06 PROCEDURE — 95708 EEG WO VID EA 12-26HR UNMNTR: CPT

## 2022-01-07 ENCOUNTER — APPOINTMENT (OUTPATIENT)
Dept: NEUROLOGY | Facility: CLINIC | Age: 77
End: 2022-01-07

## 2022-01-07 PROCEDURE — 95708 EEG WO VID EA 12-26HR UNMNTR: CPT

## 2022-01-07 PROCEDURE — 95700 EEG CONT REC W/VID EEG TECH: CPT

## 2022-01-07 PROCEDURE — 95721 EEG PHY/QHP>36<60 HR W/O VID: CPT

## 2022-01-12 ENCOUNTER — APPOINTMENT (OUTPATIENT)
Dept: NEUROLOGY | Facility: CLINIC | Age: 77
End: 2022-01-12
Payer: MEDICARE

## 2022-01-12 PROCEDURE — 96116 NUBHVL XM PHYS/QHP 1ST HR: CPT | Mod: 95

## 2022-01-12 PROCEDURE — 96133 NRPSYC TST EVAL PHYS/QHP EA: CPT | Mod: 95

## 2022-01-12 PROCEDURE — 96138 PSYCL/NRPSYC TECH 1ST: CPT | Mod: NC,95

## 2022-01-12 PROCEDURE — 96132 NRPSYC TST EVAL PHYS/QHP 1ST: CPT | Mod: 95

## 2022-01-12 PROCEDURE — 96139 PSYCL/NRPSYC TST TECH EA: CPT | Mod: NC,95

## 2022-01-13 ENCOUNTER — TRANSCRIPTION ENCOUNTER (OUTPATIENT)
Age: 77
End: 2022-01-13

## 2022-01-20 ENCOUNTER — APPOINTMENT (OUTPATIENT)
Dept: NEUROLOGY | Facility: CLINIC | Age: 77
End: 2022-01-20

## 2022-01-24 ENCOUNTER — APPOINTMENT (OUTPATIENT)
Dept: NEUROLOGY | Facility: CLINIC | Age: 77
End: 2022-01-24
Payer: MEDICARE

## 2022-01-24 PROCEDURE — 99214 OFFICE O/P EST MOD 30 MIN: CPT | Mod: 95

## 2022-01-24 NOTE — DISCUSSION/SUMMARY
[FreeTextEntry1] : Ms. DIEGO presents today for a follow up visit of Epilepsy s/p right frontotemporal ablation in 2003 and cognitive decline. \par \par At this time family and patient deny any known seizures. Currently reporting worsening in memory with progression on NPT showing a a major neurocognitive disorder. Given subjective and objective changes in cognition, we do recommend Namenda to potential decrease progression. Medication benefits and potential SE discussed a length.   \par \par At this time we recommend the following: \par 1. Continue -500mg\par 2. Continue Carbatrol 600mg TID\par 3. Serology as ordered\par 4. Start Namenda 5mg qhs and increase by 5mg weekly until on 10mg BID\par \par Educated patient and family regarding helpful tips in maintaining cognitive functioning to include taking care of physical health, adequate sleep, adequate nutrition , being physically active, having a routine/schedule, connecting in social activities and managing stress. \par \par All questions and concerns were addressed. Emotional support was rendered. Dr. Najjar was present for today's visit.

## 2022-01-24 NOTE — HISTORY OF PRESENT ILLNESS
[FreeTextEntry1] : Kimberly presents today for a follow up visit of Epilepsy s/p right frontotemporal ablation in 2003. \par \par Last known seizure was in Feb 2020 while prepping over night for an outpatient colonoscopy. No further events since then. Last brain MRI from 12/2019 was suspicious for neoplasm to the left frontal bone, but had a PET scan that did not show signs of malignancy.\par \par Last VEEG 11/2020 showed mild to moderate gen slowing over bi-temporal regions. Rare spikes over let temporal region. Last AEEG 1/2022 showed mild gen background slowing and frequent left temporal polymorphic delta slowing during drowsiness with superimposed sharp waves, not clearly epileptiform.  NPT in 10/2020 and consistent with mild neurocognitive disorder that is multifactorial. Was repeated this month with Dr. Thomas who felt there was a prominent decline since her last evaluation. She now meets criteria for a major neurocognitive disorder.  \par ____________________________________________________________________________\par At this time patient and daughter deny any seizure, seizure-like activity, LOC, lapses in time, tongue biting, staring spells or urinary incontinence. COmpliant with med regimen though at times is forgetful if she took her dose. \par \par Family feels that her cognition has declined since [place] time. Reporting losing her train of though, not realizing the time and just general confusion with medication regimen. Symptoms are worse with environmental triggers. No progression noted over the last few months. Is in fact a little better since sleep has been more controlled. \par \par Last AED checked Jan 7th by PCP: VPA: 55 (), Tegretol: 9.4 (4-12). \par \par Left breast  CA managed by oncologist. In summary, was dx with left breast CA in 2019. Treated with left lumpectomy and radiation in 10/2019. \par \par Current medication regimen: \par Carbatrol 600mg TID\par VPA: 750-500mg

## 2022-01-28 ENCOUNTER — TRANSCRIPTION ENCOUNTER (OUTPATIENT)
Age: 77
End: 2022-01-28

## 2022-01-30 ENCOUNTER — FORM ENCOUNTER (OUTPATIENT)
Age: 77
End: 2022-01-30

## 2022-02-01 ENCOUNTER — APPOINTMENT (OUTPATIENT)
Dept: NEUROLOGY | Facility: CLINIC | Age: 77
End: 2022-02-01
Payer: MEDICARE

## 2022-02-01 PROCEDURE — 96133 NRPSYC TST EVAL PHYS/QHP EA: CPT | Mod: 95

## 2022-02-15 ENCOUNTER — NON-APPOINTMENT (OUTPATIENT)
Age: 77
End: 2022-02-15

## 2022-03-21 ENCOUNTER — APPOINTMENT (OUTPATIENT)
Dept: NEUROLOGY | Facility: CLINIC | Age: 77
End: 2022-03-21
Payer: MEDICARE

## 2022-03-21 PROCEDURE — 99213 OFFICE O/P EST LOW 20 MIN: CPT | Mod: 95

## 2022-03-21 RX ORDER — MEMANTINE HYDROCHLORIDE 5 MG/1
5 TABLET, FILM COATED ORAL
Qty: 60 | Refills: 0 | Status: DISCONTINUED | COMMUNITY
Start: 2022-01-24 | End: 2022-03-21

## 2022-03-21 NOTE — HISTORY OF PRESENT ILLNESS
[FreeTextEntry1] : Kimberly presents today for a follow up visit of Epilepsy s/p right frontotemporal ablation in 2003. \par \par Last known seizure was in Feb 2020 while prepping over night for an outpatient colonoscopy. No further events since then. Last brain MRI from 12/2019 was suspicious for neoplasm to the left frontal bone, but had a PET scan that did not show signs of malignancy.\par \par Last VEEG 11/2020 showed mild to moderate gen slowing over bi-temporal regions. Rare spikes over let temporal region. Last AEEG 1/2022 showed mild gen background slowing and frequent left temporal polymorphic delta slowing during drowsiness with superimposed sharp waves, not clearly epileptiform.  NPT in 10/2020 and consistent with mild neurocognitive disorder that is multifactorial. Was repeated this month with Dr. Thomas who felt there was a prominent decline since her last evaluation. She now meets criteria for a major neurocognitive disorder.  \par ____________________________________________________________________________\par At this time patient and daughter deny any seizure, seizure-like activity, LOC, lapses in time, tongue biting, staring spells or urinary incontinence. Compliant with med regimen though at times is forgetful if she took her dose. \par \par Family feels that her cognition has declined since [place] time. Has been stable since January 2022. Reporting losing her train of thought, not realizing the time and just general confusion with medication regimen. Symptoms are worse with environmental triggers such as weather changes. \par \par Her sleep can fluctuate. Family reports she gets anxious at night if she has something planned the following day. \par Recent NPT shows patient meets criteria of a Major neurocognitive disorder (dementia). Was started on Namenda after last visit. Was discontinued due to side effects of dizziness, off balance and a fall. Patient was on 5mg at that time. \par \par She denies any new neurological complaints at this time. \par \par Last AED checked Jan 7th by PCP: VPA: 55 (), Tegretol: 9.4 (4-12). \par \par Left breast  CA managed by oncologist. In summary, was dx with left breast CA in 2019. Treated with left lumpectomy and radiation in 10/2019. \par \par Current medication regimen: \par Carbatrol 600mg TID\par VPA: 750-500mg

## 2022-03-21 NOTE — REASON FOR VISIT
[Home] : at home, [unfilled] , at the time of the visit. [Other:____] : [unfilled] [Verbal consent obtained from patient] : the patient, [unfilled]

## 2022-04-08 ENCOUNTER — RX RENEWAL (OUTPATIENT)
Age: 77
End: 2022-04-08

## 2022-06-14 ENCOUNTER — APPOINTMENT (OUTPATIENT)
Dept: GERIATRICS | Facility: CLINIC | Age: 77
End: 2022-06-14
Payer: MEDICARE

## 2022-06-14 VITALS
TEMPERATURE: 97.3 F | BODY MASS INDEX: 24.07 KG/M2 | WEIGHT: 141 LBS | HEIGHT: 64 IN | RESPIRATION RATE: 15 BRPM | HEART RATE: 87 BPM | DIASTOLIC BLOOD PRESSURE: 68 MMHG | SYSTOLIC BLOOD PRESSURE: 118 MMHG | OXYGEN SATURATION: 97 %

## 2022-06-14 DIAGNOSIS — Z82.49 FAMILY HISTORY OF ISCHEMIC HEART DISEASE AND OTHER DISEASES OF THE CIRCULATORY SYSTEM: ICD-10-CM

## 2022-06-14 DIAGNOSIS — Z80.51 FAMILY HISTORY OF MALIGNANT NEOPLASM OF KIDNEY: ICD-10-CM

## 2022-06-14 DIAGNOSIS — Z86.010 PERSONAL HISTORY OF COLONIC POLYPS: ICD-10-CM

## 2022-06-14 DIAGNOSIS — Z83.438 FAMILY HISTORY OF OTHER DISORDER OF LIPOPROTEIN METABOLISM AND OTHER LIPIDEMIA: ICD-10-CM

## 2022-06-14 DIAGNOSIS — Z82.61 FAMILY HISTORY OF ARTHRITIS: ICD-10-CM

## 2022-06-14 DIAGNOSIS — Z81.8 FAMILY HISTORY OF OTHER MENTAL AND BEHAVIORAL DISORDERS: ICD-10-CM

## 2022-06-14 PROCEDURE — 99205 OFFICE O/P NEW HI 60 MIN: CPT

## 2022-06-14 NOTE — HISTORY OF PRESENT ILLNESS
[FreeTextEntry1] : Mrs. Mae is a 77-year-old woman with a complicated past medical history presenting to [facility].  She is accompanied by her  Jaison and her daughter Dottie who assists with history.  The patient is  and lives with her  in a [facility] home in [place].  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  She is independent in most basic ADLs.  She walks without an assistive device but has significant difficulty with stairs and needs to handrails.  The patient's medical history is significant for meningioma status post right frontal craniotomy with resection in 2003.  She is status post left lumpectomy for breast cancer followed by radiation therapy (Dr. Navarro at [facility]).  Additional history includes hypertension and colon polyps on colonoscopy in November 2020 with recommendation for repeat colonoscopy in 3 years.\par Shortly before the pandemic the patient was noted to have some cognitive impairment.  In January the patient had neuropsych testing significant for deficits across multiple cognitive domains consistent with a neurodegenerative process.  She did not tolerate a trial of Namenda and neurology was reluctant to put her on a cholinesterase inhibitor.\par Currently the patient has a home health aide roughly 6 hours a day for 6 days a week, the remainder is supplemented by her , as well as her daughter who lives for doors down.\par PMD Dr. Chicas, [place]\par Endocrinology Dr. Finch at [facility]. [One fall no injury in past year] : Patient reported one fall in the past year without injury

## 2022-06-14 NOTE — REVIEW OF SYSTEMS
[Recent Weight Loss (___ Lbs)] : recent [unfilled] ~Ulb weight loss [Lower Ext Edema] : lower extremity edema [As Noted in HPI] : as noted in HPI [Negative] : Heme/Lymph

## 2022-06-14 NOTE — PHYSICAL EXAM
[Alert] : alert [No Acute Distress] : in no acute distress [Normal] : normal skin color and pigmentation [No Focal Deficits] : no focal deficits [FreeTextEntry1] : Keila malhotra [de-identified] : 1+ edema to about the mid tibias

## 2022-07-07 ENCOUNTER — RX RENEWAL (OUTPATIENT)
Age: 77
End: 2022-07-07

## 2022-07-12 ENCOUNTER — APPOINTMENT (OUTPATIENT)
Dept: NEUROLOGY | Facility: CLINIC | Age: 77
End: 2022-07-12

## 2022-07-12 VITALS
BODY MASS INDEX: 24.41 KG/M2 | HEIGHT: 64 IN | WEIGHT: 143 LBS | DIASTOLIC BLOOD PRESSURE: 75 MMHG | SYSTOLIC BLOOD PRESSURE: 123 MMHG | HEART RATE: 81 BPM

## 2022-07-12 PROCEDURE — 99214 OFFICE O/P EST MOD 30 MIN: CPT

## 2022-07-12 NOTE — PHYSICAL EXAM
[General Appearance - Alert] : alert [General Appearance - In No Acute Distress] : in no acute distress [Oriented To Time, Place, And Person] : oriented to person, place, and time [Impaired Insight] : insight and judgment were intact [Affect] : the affect was normal [Person] : oriented to person [Place] : oriented to place [Time] : oriented to time [Concentration Intact] : normal concentrating ability [Visual Intact] : visual attention was ~T not ~L decreased [Naming Objects] : no difficulty naming common objects [Repeating Phrases] : no difficulty repeating a phrase [Writing A Sentence] : no difficulty writing a sentence [Fluency] : fluency intact [Comprehension] : comprehension intact [Reading] : reading intact [Past History] : adequate knowledge of personal past history [Cranial Nerves Optic (II)] : visual acuity intact bilaterally,  visual fields full to confrontation, pupils equal round and reactive to light [Cranial Nerves Oculomotor (III)] : extraocular motion intact [Cranial Nerves Trigeminal (V)] : facial sensation intact symmetrically [Cranial Nerves Facial (VII)] : face symmetrical [Cranial Nerves Vestibulocochlear (VIII)] : hearing was intact bilaterally [Cranial Nerves Glossopharyngeal (IX)] : tongue and palate midline [Cranial Nerves Accessory (XI - Cranial And Spinal)] : head turning and shoulder shrug symmetric [Cranial Nerves Hypoglossal (XII)] : there was no tongue deviation with protrusion [Motor Tone] : muscle tone was normal in all four extremities [Motor Strength] : muscle strength was normal in all four extremities [No Muscle Atrophy] : normal bulk in all four extremities [Sensation Tactile Decrease] : light touch was intact [Abnormal Walk] : normal gait [Balance] : balance was intact [2+] : Ankle jerk left 2+ [Extraocular Movements] : extraocular movements were intact [Paresis Pronator Drift Right-Sided] : no pronator drift on the right [Paresis Pronator Drift Left-Sided] : no pronator drift on the left [Motor Strength Upper Extremities Bilaterally] : strength was normal in both upper extremities [Motor Strength Lower Extremities Bilaterally] : strength was normal in both lower extremities [Past-pointing] : there was no past-pointing [Plantar Reflex Right Only] : normal on the right [Plantar Reflex Left Only] : normal on the left [FreeTextEntry5] : B/L INTENTION TREMOR NOTED ON F-N EXAM WITH IMPAIRED FINE MOTOR SKILL.  [FreeTextEntry8] : FINE TREMOR TO THE B/L UE. NOT PROGRESSIVE

## 2022-07-12 NOTE — DISCUSSION/SUMMARY
[FreeTextEntry1] : Patient with Epilepsy and Dementia. \par \par  Doing well since last seen. no seizures or progression in cognitive status. Educated patient and family regarding helpful tips in maintaining cognitive functioning to include taking care of physical health, adequate sleep, adequate nutrition , being physically active, having a routine/schedule, connecting in social activities and managing stress. \par \par At this time we recommend the following: \par 1. Continue AED as above\par 2. Follow up in 4 months if stable\par \par All questions and concerns were addressed to the best of my ability. Emotional support was rendered. Dr. Najjar was present for today's visit.

## 2022-09-20 ENCOUNTER — APPOINTMENT (OUTPATIENT)
Dept: GERIATRICS | Facility: CLINIC | Age: 77
End: 2022-09-20

## 2022-09-20 VITALS
HEART RATE: 88 BPM | SYSTOLIC BLOOD PRESSURE: 138 MMHG | HEIGHT: 64 IN | DIASTOLIC BLOOD PRESSURE: 80 MMHG | OXYGEN SATURATION: 97 % | RESPIRATION RATE: 16 BRPM | BODY MASS INDEX: 24.24 KG/M2 | TEMPERATURE: 97.4 F | WEIGHT: 142 LBS

## 2022-09-20 DIAGNOSIS — Z23 ENCOUNTER FOR IMMUNIZATION: ICD-10-CM

## 2022-09-20 PROCEDURE — 99214 OFFICE O/P EST MOD 30 MIN: CPT | Mod: 25

## 2022-09-20 PROCEDURE — G0008: CPT

## 2022-09-20 PROCEDURE — 90662 IIV NO PRSV INCREASED AG IM: CPT

## 2022-09-20 NOTE — HISTORY OF PRESENT ILLNESS
[FreeTextEntry1] : Mrs. Mae presents for follow-up accompanied by her  and her daughter.  The patient feels generally well.  About a month ago she experienced a seizure lasting about 30 seconds to a minute, no Ativan was administered, seizure resolved on its own.  Patient has a home health aide 6 hours a day, 5 days a week to assist with ADLs, paid for out-of-pocket.  Family is applying for Medicaid.  The patient has not had any falls.  Mood seems to be generally good.  Patient receiving physical therapy once a week, seems to be helping.

## 2022-09-20 NOTE — PHYSICAL EXAM
[Sclera] : the sclera and conjunctiva were normal [No Spinal Tenderness] : no spinal tenderness [Normal] : normal skin color and pigmentation [No Focal Deficits] : no focal deficits [Normal Affect] : the affect was normal [FreeTextEntry1] : Keila malhotra [de-identified] : Varicose veins present, no significant edema [de-identified] : Severely scoliotic

## 2022-09-21 LAB
25(OH)D3 SERPL-MCNC: 55.7 NG/ML
ALBUMIN SERPL ELPH-MCNC: 4.5 G/DL
ALP BLD-CCNC: 42 U/L
ALT SERPL-CCNC: 20 U/L
AMMONIA PLAS-MCNC: 25.5 UMOL/L
ANION GAP SERPL CALC-SCNC: 17 MMOL/L
AST SERPL-CCNC: 26 U/L
BASOPHILS # BLD AUTO: 0.07 K/UL
BASOPHILS NFR BLD AUTO: 1 %
BILIRUB SERPL-MCNC: 0.3 MG/DL
BUN SERPL-MCNC: 30 MG/DL
CALCIUM SERPL-MCNC: 10.1 MG/DL
CARBAMAZEPINE SERPL-MCNC: 9.6 UG/ML
CHLORIDE SERPL-SCNC: 98 MMOL/L
CHOLEST SERPL-MCNC: 235 MG/DL
CO2 SERPL-SCNC: 23 MMOL/L
CREAT SERPL-MCNC: 0.6 MG/DL
EGFR: 94 ML/MIN/1.73M2
EOSINOPHIL # BLD AUTO: 0.05 K/UL
EOSINOPHIL NFR BLD AUTO: 0.7 %
ESTIMATED AVERAGE GLUCOSE: 108 MG/DL
GLUCOSE SERPL-MCNC: 81 MG/DL
HBA1C MFR BLD HPLC: 5.4 %
HCT VFR BLD CALC: 40.3 %
HDLC SERPL-MCNC: 96 MG/DL
HGB BLD-MCNC: 13.3 G/DL
IMM GRANULOCYTES NFR BLD AUTO: 0.3 %
LDLC SERPL CALC-MCNC: 121 MG/DL
LYMPHOCYTES # BLD AUTO: 1.57 K/UL
LYMPHOCYTES NFR BLD AUTO: 22.4 %
MAN DIFF?: NORMAL
MCHC RBC-ENTMCNC: 30.5 PG
MCHC RBC-ENTMCNC: 33 GM/DL
MCV RBC AUTO: 92.4 FL
MONOCYTES # BLD AUTO: 0.71 K/UL
MONOCYTES NFR BLD AUTO: 10.1 %
NEUTROPHILS # BLD AUTO: 4.58 K/UL
NEUTROPHILS NFR BLD AUTO: 65.5 %
NONHDLC SERPL-MCNC: 139 MG/DL
PLATELET # BLD AUTO: 188 K/UL
POTASSIUM SERPL-SCNC: 5 MMOL/L
PROT SERPL-MCNC: 7.1 G/DL
RBC # BLD: 4.36 M/UL
RBC # FLD: 13.5 %
SODIUM SERPL-SCNC: 138 MMOL/L
TRIGL SERPL-MCNC: 87 MG/DL
TSH SERPL-ACNC: 0.96 UIU/ML
VALPROATE SERPL-MCNC: 101 UG/ML
WBC # FLD AUTO: 7 K/UL

## 2022-09-25 ENCOUNTER — FORM ENCOUNTER (OUTPATIENT)
Age: 77
End: 2022-09-25

## 2022-10-07 ENCOUNTER — NON-APPOINTMENT (OUTPATIENT)
Age: 77
End: 2022-10-07

## 2022-10-19 ENCOUNTER — FORM ENCOUNTER (OUTPATIENT)
Age: 77
End: 2022-10-19

## 2022-10-25 ENCOUNTER — RX RENEWAL (OUTPATIENT)
Age: 77
End: 2022-10-25

## 2023-01-09 ENCOUNTER — RX RENEWAL (OUTPATIENT)
Age: 78
End: 2023-01-09

## 2023-01-10 ENCOUNTER — APPOINTMENT (OUTPATIENT)
Dept: NEUROLOGY | Facility: CLINIC | Age: 78
End: 2023-01-10
Payer: MEDICARE

## 2023-01-10 PROCEDURE — 99214 OFFICE O/P EST MOD 30 MIN: CPT

## 2023-01-11 NOTE — DISCUSSION/SUMMARY
[FreeTextEntry1] : Patient with Epilepsy and Dementia. \par \par Doing well since last seen. No seizures. Continues to report mild progressive cognitive decline. Family wishes to hold off on adding any medication given previous side effects and that she is doing reasonably well.  \par \par Educated patient and family regarding helpful tips in maintaining cognitive functioning to include taking care of physical health, adequate sleep, adequate nutrition , being physically active, having a routine/schedule, connecting in social activities and managing stress. \par \par At this time we recommend the following: \par -Continue AED as above\par -Serology as ordered \par -Follow up in 6 months if stable\par \par All questions and concerns were addressed to the best of my ability. Emotional support was rendered. Dr. Najjar was present for today's visit.

## 2023-01-11 NOTE — HISTORY OF PRESENT ILLNESS
[FreeTextEntry1] : Kimberly presents today for a follow up visit of Epilepsy s/p right frontotemporal ablation in 2003. Recent NPT shows Major neurocognitive disorder (Dementia). \par \par Last brain MRI from 12/2019 was suspicious for neoplasm to the left frontal bone, but had a PET scan that did not show signs of malignancy. VEEG 11/2020 showed mild to moderate gen slowing over bi-temporal regions. Rare spikes over let temporal region. Last AEEG 1/2022 showed mild gen background slowing and frequent left temporal polymorphic delta slowing during drowsiness with superimposed sharp waves, not clearly epileptiform.  NPT in 10/2020 and consistent with mild neurocognitive disorder that is multifactorial. Was repeated this month with Dr. Thomas who felt there was a prominent decline since her last evaluation. She now meets criteria for a major neurocognitive disorder.  \par ____________________________________________________________________________\par At this time patient and family deny any seizure, seizure-like activity, LOC, lapses in time, tongue biting, staring spells or urinary incontinence. Compliant with med regimen.\par \par Was tried on Namenda last year after family reported cognitive decline. Patient did not tolerate medication well and reported side effects of lightheadedness and dizziness. Continue ti report a slow cognitive decline. Patient currently has a HHA for about 5 hours a day to help with ADLs. \par \par Sleep is stable and they deny any new neurological complaints at this time. \par \par ____________________________________________\par Current medication regimen: \par Carbatrol 600mg TID\par VPA: 750-500mg

## 2023-01-11 NOTE — PHYSICAL EXAM
[General Appearance - Alert] : alert [General Appearance - In No Acute Distress] : in no acute distress [Oriented To Time, Place, And Person] : oriented to person, place, and time [Impaired Insight] : insight and judgment were intact [Affect] : the affect was normal [Person] : oriented to person [Place] : oriented to place [Time] : oriented to time [Concentration Intact] : normal concentrating ability [Visual Intact] : visual attention was ~T not ~L decreased [Naming Objects] : no difficulty naming common objects [Repeating Phrases] : no difficulty repeating a phrase [Writing A Sentence] : no difficulty writing a sentence [Fluency] : fluency intact [Comprehension] : comprehension intact [Reading] : reading intact [Past History] : adequate knowledge of personal past history [Cranial Nerves Optic (II)] : visual acuity intact bilaterally,  visual fields full to confrontation, pupils equal round and reactive to light [Cranial Nerves Oculomotor (III)] : extraocular motion intact [Cranial Nerves Trigeminal (V)] : facial sensation intact symmetrically [Cranial Nerves Facial (VII)] : face symmetrical [Cranial Nerves Vestibulocochlear (VIII)] : hearing was intact bilaterally [Cranial Nerves Glossopharyngeal (IX)] : tongue and palate midline [Cranial Nerves Accessory (XI - Cranial And Spinal)] : head turning and shoulder shrug symmetric [Cranial Nerves Hypoglossal (XII)] : there was no tongue deviation with protrusion [Motor Tone] : muscle tone was normal in all four extremities [Motor Strength] : muscle strength was normal in all four extremities [No Muscle Atrophy] : normal bulk in all four extremities [Sensation Tactile Decrease] : light touch was intact [Abnormal Walk] : normal gait [Balance] : balance was intact [2+] : Ankle jerk left 2+ [Paresis Pronator Drift Right-Sided] : no pronator drift on the right [Paresis Pronator Drift Left-Sided] : no pronator drift on the left [Motor Strength Upper Extremities Bilaterally] : strength was normal in both upper extremities [Motor Strength Lower Extremities Bilaterally] : strength was normal in both lower extremities [Past-pointing] : there was no past-pointing [Plantar Reflex Right Only] : normal on the right [Plantar Reflex Left Only] : normal on the left [FreeTextEntry5] : B/L INTENTION TREMOR NOTED ON F-N EXAM WITH IMPAIRED FINE MOTOR SKILL. UNCHANGED

## 2023-01-22 ENCOUNTER — NON-APPOINTMENT (OUTPATIENT)
Age: 78
End: 2023-01-22

## 2023-01-23 ENCOUNTER — APPOINTMENT (OUTPATIENT)
Dept: GERIATRICS | Facility: CLINIC | Age: 78
End: 2023-01-23
Payer: MEDICARE

## 2023-01-23 VITALS
BODY MASS INDEX: 23.69 KG/M2 | RESPIRATION RATE: 15 BRPM | WEIGHT: 138 LBS | OXYGEN SATURATION: 99 % | DIASTOLIC BLOOD PRESSURE: 78 MMHG | HEART RATE: 82 BPM | TEMPERATURE: 97 F | SYSTOLIC BLOOD PRESSURE: 122 MMHG

## 2023-01-23 DIAGNOSIS — D22.9 MELANOCYTIC NEVI, UNSPECIFIED: ICD-10-CM

## 2023-01-23 PROCEDURE — 99214 OFFICE O/P EST MOD 30 MIN: CPT

## 2023-01-23 NOTE — PHYSICAL EXAM
[EOMI] : extraocular movements were intact [PERRL] : pupils were equal in size, round, and reactive to light [Normal Outer Ear/Nose] : the ears and nose were normal in appearance [Both Tympanic Membranes Were Examined] : both tympanic membranes were normal [Normal Appearance] : the appearance of the neck was normal [Supple] : the neck was supple [No Acc Muscle Use] : no accessory muscle use [Auscultation Breath Sounds / Voice Sounds] : lungs were clear to auscultation bilaterally [Normal S1, S2] : normal S1 and S2 [Edema] : edema was not present [Abdomen Tenderness] : non-tender [Abdomen Soft] : soft [Normal] : posterior cervical, supraclavicular, anterior cervical, supraclavicular, axillary [No Spinal Tenderness] : no spinal tenderness [No Focal Deficits] : no focal deficits [Normal Affect] : the affect was normal [FreeTextEntry1] : Keila malhotra [de-identified] : Severely kyphoscoliotic [de-identified] : Multiple nevi, mole on back is dark-colored

## 2023-01-23 NOTE — HISTORY OF PRESENT ILLNESS
[FreeTextEntry1] : Mrs. Mae presents for follow-up accompanied by her  and her daughter Dottie.  Overall the patient is doing well.  She now has home health aides to help care for her.  She has not had any falls.  Mood has been generally good.  She denies feeling down, depressed or hopeless.  She enjoys going out for walks and visits with her family.  Daughter is concerned about weight loss but her weight has been more or less stable over the past year.  For some reason she still does the cooking for herself and her , and has not allowed her aides to cook.  Because of multiple food allergies she has a restricted diet.  Upon reviewing her dietary choices, it is quite obvious that she runs on a bit of a calorie deficit.\par Other than some chronic low back pain which is relieved with nightly Tylenol, the patient has no other physical complaints. [No falls in past year] : Patient reported no falls in the past year [Completely Independent] : Completely independent. [] : using the telephone [Independent] : meal preparation [0] : 2) Feeling down, depressed, or hopeless: Not at all (0) [PHQ-2 Negative - No further assessment needed] : PHQ-2 Negative - No further assessment needed [NIY2Lskdx] : 0

## 2023-01-24 LAB
25(OH)D3 SERPL-MCNC: 48.7 NG/ML
ALBUMIN SERPL ELPH-MCNC: 4.5 G/DL
ALP BLD-CCNC: 45 U/L
ALT SERPL-CCNC: 19 U/L
ANION GAP SERPL CALC-SCNC: 11 MMOL/L
AST SERPL-CCNC: 20 U/L
BASOPHILS # BLD AUTO: 0.07 K/UL
BASOPHILS NFR BLD AUTO: 1.3 %
BILIRUB SERPL-MCNC: 0.2 MG/DL
BUN SERPL-MCNC: 33 MG/DL
CALCIUM SERPL-MCNC: 9.6 MG/DL
CALCIUM SERPL-MCNC: 9.6 MG/DL
CARBAMAZEPINE SERPL-MCNC: 9.6 UG/ML
CHLORIDE SERPL-SCNC: 103 MMOL/L
CO2 SERPL-SCNC: 26 MMOL/L
CREAT SERPL-MCNC: 0.64 MG/DL
EGFR: 92 ML/MIN/1.73M2
EOSINOPHIL # BLD AUTO: 0.08 K/UL
EOSINOPHIL NFR BLD AUTO: 1.5 %
GLUCOSE SERPL-MCNC: 73 MG/DL
HCT VFR BLD CALC: 39.9 %
HGB BLD-MCNC: 12.8 G/DL
IMM GRANULOCYTES NFR BLD AUTO: 0.2 %
LYMPHOCYTES # BLD AUTO: 1.52 K/UL
LYMPHOCYTES NFR BLD AUTO: 28.2 %
MAN DIFF?: NORMAL
MCHC RBC-ENTMCNC: 30.3 PG
MCHC RBC-ENTMCNC: 32.1 GM/DL
MCV RBC AUTO: 94.3 FL
MONOCYTES # BLD AUTO: 0.55 K/UL
MONOCYTES NFR BLD AUTO: 10.2 %
NEUTROPHILS # BLD AUTO: 3.16 K/UL
NEUTROPHILS NFR BLD AUTO: 58.6 %
PARATHYROID HORMONE INTACT: 42 PG/ML
PLATELET # BLD AUTO: 214 K/UL
POTASSIUM SERPL-SCNC: 5.9 MMOL/L
PROT SERPL-MCNC: 6.9 G/DL
RBC # BLD: 4.23 M/UL
RBC # FLD: 13.4 %
SODIUM SERPL-SCNC: 139 MMOL/L
TSH SERPL-ACNC: 0.98 UIU/ML
VALPROATE SERPL-MCNC: 49 UG/ML
WBC # FLD AUTO: 5.39 K/UL

## 2023-02-01 LAB
ANION GAP SERPL CALC-SCNC: 8 MMOL/L
BUN SERPL-MCNC: 35 MG/DL
CALCIUM SERPL-MCNC: 9.8 MG/DL
CHLORIDE SERPL-SCNC: 101 MMOL/L
CO2 SERPL-SCNC: 28 MMOL/L
CREAT SERPL-MCNC: 0.57 MG/DL
EGFR: 95 ML/MIN/1.73M2
GLUCOSE SERPL-MCNC: 89 MG/DL
POTASSIUM SERPL-SCNC: 4.9 MMOL/L
SODIUM SERPL-SCNC: 138 MMOL/L

## 2023-04-05 NOTE — DISCUSSION/SUMMARY
[FreeTextEntry1] : Patient with Epilepsy s/p right frontotemporal ablation in 2003 and Dementia diagnosed via NPT from 2/2022. \par \par Did not tolerate Namenda 5mg due to side effects and was discontinued. Family feel she is stable cognitively, no changes. Discussed importance of maintaining a regimen, adequate sleep, nutrition and exercise to stabilize cognitively give we are unable to treat with Namenda. \par \par Doing well from epilepsy standpoint. At this time we recommend the following: \par \par 1. PT\par 2. TEB in 3 mo if stable\par \par All questions and concerns were addressed. Emotional support was rendered. Quality 130: Documentation Of Current Medications In The Medical Record: Current Medications Documented Quality 431: Preventive Care And Screening: Unhealthy Alcohol Use - Screening: Patient not identified as an unhealthy alcohol user when screened for unhealthy alcohol use using a systematic screening method Quality 110: Preventive Care And Screening: Influenza Immunization: Influenza Immunization not Administered for Documented Reasons. Quality 226: Preventive Care And Screening: Tobacco Use: Screening And Cessation Intervention: Patient screened for tobacco use and is an ex/non-smoker Detail Level: Detailed

## 2023-04-10 ENCOUNTER — APPOINTMENT (OUTPATIENT)
Dept: GERIATRICS | Facility: CLINIC | Age: 78
End: 2023-04-10
Payer: MEDICARE

## 2023-04-10 VITALS
DIASTOLIC BLOOD PRESSURE: 72 MMHG | WEIGHT: 142.13 LBS | BODY MASS INDEX: 24.27 KG/M2 | OXYGEN SATURATION: 98 % | TEMPERATURE: 97.3 F | HEIGHT: 64 IN | SYSTOLIC BLOOD PRESSURE: 110 MMHG | RESPIRATION RATE: 16 BRPM | HEART RATE: 77 BPM

## 2023-04-10 PROCEDURE — 99214 OFFICE O/P EST MOD 30 MIN: CPT

## 2023-04-10 NOTE — HISTORY OF PRESENT ILLNESS
[FreeTextEntry1] : Mrs. Castanon presents for follow-up accompanied by her daughter Dottie.  Overall she is doing quite well.  She has a home health aide and still requires pretty much 24-hour supervision although she is capable of some light chores including cooking.  She is not taking any nutritional supplements but has focused on eating better and her weight has been stable.  Her mood has been excellent.  She is no longer receiving physical therapy but has been given some exercises by a chiropractor which have helped a lot with her back pain.

## 2023-04-10 NOTE — PHYSICAL EXAM
[EOMI] : extraocular movements were intact [PERRL] : pupils were equal in size, round, and reactive to light [Normal Outer Ear/Nose] : the ears and nose were normal in appearance [Both Tympanic Membranes Were Examined] : both tympanic membranes were normal [Normal Appearance] : the appearance of the neck was normal [Supple] : the neck was supple [Auscultation Breath Sounds / Voice Sounds] : lungs were clear to auscultation bilaterally [Normal S1, S2] : normal S1 and S2 [Edema] : edema was not present [Abdomen Tenderness] : non-tender [Abdomen Soft] : soft [Normal] : posterior cervical, supraclavicular, anterior cervical, supraclavicular, axillary [No Spinal Tenderness] : no spinal tenderness [Normal Color / Pigmentation] : normal skin color and pigmentation [No Focal Deficits] : no focal deficits [Normal Affect] : the affect was normal [FreeTextEntry1] : Keila malhotra [de-identified] : kyphoscoliotic

## 2023-04-17 ENCOUNTER — APPOINTMENT (OUTPATIENT)
Dept: GERIATRICS | Facility: CLINIC | Age: 78
End: 2023-04-17

## 2023-06-05 ENCOUNTER — APPOINTMENT (OUTPATIENT)
Dept: NEUROLOGY | Facility: CLINIC | Age: 78
End: 2023-06-05
Payer: MEDICARE

## 2023-06-05 VITALS
DIASTOLIC BLOOD PRESSURE: 85 MMHG | HEIGHT: 64 IN | OXYGEN SATURATION: 98 % | SYSTOLIC BLOOD PRESSURE: 129 MMHG | BODY MASS INDEX: 24.24 KG/M2 | TEMPERATURE: 98 F | WEIGHT: 142 LBS | HEART RATE: 123 BPM

## 2023-06-05 PROCEDURE — 99213 OFFICE O/P EST LOW 20 MIN: CPT | Mod: 95

## 2023-06-05 NOTE — HISTORY OF PRESENT ILLNESS
[FreeTextEntry1] : Kimberly presents today for a follow up visit of Epilepsy s/p right frontotemporal ablation in 2003. Recent NPT from 2/2022 shows Major neurocognitive disorder (Dementia). \par \par Diagnostic testing review: \par -Brain MRI from 12/2019 was suspicious for neoplasm to the left frontal bone, but had a PET scan that did not show signs of malignancy. \par -VEEG 11/2020 showed mild to moderate gen slowing over bi-temporal regions. Rare spikes over left temporal region.\par - AEEG 1/2022 showed mild gen background slowing and frequent left temporal polymorphic delta slowing during drowsiness with superimposed sharp waves, not clearly epileptiform.  \par -NPT in 10/2020 and consistent with mild neurocognitive disorder that is multifactorial. Was repeated in 2/2022 with Dr. Thomas who felt there was a prominent decline since her last evaluation. She now meets criteria for a major neurocognitive disorder.  \par _____________________________________________\par At this time patient and family deny any seizure, seizure-like activity, LOC, lapses in time, tongue biting, staring spells or urinary incontinence. Compliant with medication regimen.\par \par Was previously tried on Namenda after family initially reported cognitive decline. Patient did not tolerate medication well and reported side effects of lightheadedness and dizziness. Family continues to report a slow cognitive decline. \par \par  feels her sleep is worse during times of bad weather such as rain and thunderstorms as well as if there is a large family gathering. Otherwise sleep is regimented and adequate. \par \par ____________________________________________\par Current medication regimen: \par Carbatrol 600mg TID\par VPA: 750-500mg

## 2023-06-05 NOTE — REASON FOR VISIT
[Home] : at home, [unfilled] , at the time of the visit. [Spouse] : spouse [Other:____] : [unfilled] [FreeTextEntry2] : Daughter

## 2023-06-05 NOTE — DISCUSSION/SUMMARY
[FreeTextEntry1] : Patient with Epilepsy and Dementia. \par \par Doing well since last seen. No seizures. Continues to report mild progressive cognitive decline. Sleeping well with the exception of days with bad weather or large family gatherings. \par \par Continued to educated patient and family regarding helpful tips in maintaining cognitive functioning to include taking care of physical health, adequate sleep, adequate nutrition, being physically active, having a routine/schedule, connecting in social activities and managing stress. \par \par At this time we recommend the following: \par -Continue AED as above\par -Increase Melatonin from 3mg to 6mg during days of bad weather or large gatherings.\par -Serology as ordered to be done in August when sees PCP for further testing. \par -Follow up in 6 months if stable\par \par All questions and concerns were addressed to the best of my ability. Emotional support was rendered.

## 2023-08-24 ENCOUNTER — NON-APPOINTMENT (OUTPATIENT)
Age: 78
End: 2023-08-24

## 2023-08-25 ENCOUNTER — APPOINTMENT (OUTPATIENT)
Dept: GERIATRICS | Facility: CLINIC | Age: 78
End: 2023-08-25
Payer: MEDICARE

## 2023-08-25 VITALS
OXYGEN SATURATION: 99 % | SYSTOLIC BLOOD PRESSURE: 126 MMHG | RESPIRATION RATE: 16 BRPM | TEMPERATURE: 97.3 F | HEART RATE: 80 BPM | HEIGHT: 64 IN | WEIGHT: 141.38 LBS | DIASTOLIC BLOOD PRESSURE: 74 MMHG | BODY MASS INDEX: 24.14 KG/M2

## 2023-08-25 LAB
ALBUMIN SERPL ELPH-MCNC: 4.5 G/DL
ALP BLD-CCNC: 38 U/L
ALT SERPL-CCNC: 22 U/L
AMMONIA PLAS-MCNC: 25 UMOL/L
ANION GAP SERPL CALC-SCNC: 12 MMOL/L
AST SERPL-CCNC: 21 U/L
BILIRUB SERPL-MCNC: 0.2 MG/DL
BUN SERPL-MCNC: 25 MG/DL
CALCIUM SERPL-MCNC: 9.6 MG/DL
CHLORIDE SERPL-SCNC: 101 MMOL/L
CO2 SERPL-SCNC: 27 MMOL/L
CREAT SERPL-MCNC: 0.65 MG/DL
EGFR: 91 ML/MIN/1.73M2
GLUCOSE SERPL-MCNC: 81 MG/DL
POTASSIUM SERPL-SCNC: 4.7 MMOL/L
PROT SERPL-MCNC: 7.1 G/DL
SODIUM SERPL-SCNC: 140 MMOL/L

## 2023-08-25 PROCEDURE — 99214 OFFICE O/P EST MOD 30 MIN: CPT

## 2023-08-25 RX ORDER — LORAZEPAM 1 MG/1
1 TABLET ORAL
Qty: 10 | Refills: 0 | Status: ACTIVE | COMMUNITY
Start: 2020-07-30 | End: 1900-01-01

## 2023-08-25 NOTE — REASON FOR VISIT
[Follow-Up] : a follow-up visit [FreeTextEntry1] : Osteoporosis, hypertension, dementia, seizure disorder

## 2023-08-25 NOTE — PHYSICAL EXAM
[Edema] : edema was not present [Normal] : posterior cervical, supraclavicular, anterior cervical, supraclavicular, axillary [No Spinal Tenderness] : no spinal tenderness [Involuntary Movements] : no involuntary movements were seen [Normal Color / Pigmentation] : normal skin color and pigmentation [No Focal Deficits] : no focal deficits [Normal Affect] : the affect was normal

## 2023-08-25 NOTE — REVIEW OF SYSTEMS
[Dysuria] : no dysuria [As Noted in HPI] : as noted in HPI [Anxiety] : anxiety [Negative] : Heme/Lymph

## 2023-08-25 NOTE — HISTORY OF PRESENT ILLNESS
[FreeTextEntry1] : Mrs. Castanon presents for follow-up accompanied by her  and her daughter.  The patient feels well.  She had 2 seizures last week followed by a period of confusion/disorientation.  Her neurologist ordered follow-up blood work with drug levels which she is planning on doing later this morning. The patient has a scab on her left knee, does not recall a fall but states that she got it while she was trying to get up. The patient exercises daily, mostly stretching and lower body strengthening exercises.  She has a home health aide 30 hours a week.  She walks daily with a  [Two or more falls in past year] : Patient reported two or more falls in the past year

## 2023-08-28 ENCOUNTER — NON-APPOINTMENT (OUTPATIENT)
Age: 78
End: 2023-08-28

## 2023-08-28 ENCOUNTER — APPOINTMENT (OUTPATIENT)
Dept: GERIATRICS | Facility: CLINIC | Age: 78
End: 2023-08-28

## 2023-08-29 LAB
OXCARBAZEPINE SERPL-MCNC: <1 UG/ML
VALPROATE SERPL-MCNC: 45 UG/ML

## 2023-08-31 ENCOUNTER — NON-APPOINTMENT (OUTPATIENT)
Age: 78
End: 2023-08-31

## 2023-09-01 LAB
CARBAMAZEPINE SERPL-MCNC: 9.8 UG/ML
VALPROATE SERPL-MCNC: 47 UG/ML

## 2023-09-26 LAB
CHOLEST SERPL-MCNC: 226 MG/DL
HDLC SERPL-MCNC: 84 MG/DL
LDLC SERPL CALC-MCNC: 120 MG/DL
NONHDLC SERPL-MCNC: 142 MG/DL
TRIGL SERPL-MCNC: 132 MG/DL

## 2023-10-11 ENCOUNTER — APPOINTMENT (OUTPATIENT)
Dept: NEUROLOGY | Facility: CLINIC | Age: 78
End: 2023-10-11
Payer: MEDICARE

## 2023-10-11 PROCEDURE — 99213 OFFICE O/P EST LOW 20 MIN: CPT | Mod: 95

## 2023-10-26 ENCOUNTER — TRANSCRIPTION ENCOUNTER (OUTPATIENT)
Age: 78
End: 2023-10-26

## 2023-10-26 RX ORDER — OLMESARTAN MEDOXOMIL 20 MG/1
20 TABLET, FILM COATED ORAL
Qty: 90 | Refills: 3 | Status: ACTIVE | COMMUNITY
Start: 2022-06-14 | End: 1900-01-01

## 2023-11-19 ENCOUNTER — NON-APPOINTMENT (OUTPATIENT)
Age: 78
End: 2023-11-19

## 2023-11-20 ENCOUNTER — APPOINTMENT (OUTPATIENT)
Dept: GERIATRICS | Facility: CLINIC | Age: 78
End: 2023-11-20
Payer: MEDICARE

## 2023-11-20 ENCOUNTER — NON-APPOINTMENT (OUTPATIENT)
Age: 78
End: 2023-11-20

## 2023-11-20 VITALS
WEIGHT: 141 LBS | TEMPERATURE: 96.6 F | DIASTOLIC BLOOD PRESSURE: 88 MMHG | RESPIRATION RATE: 15 BRPM | BODY MASS INDEX: 24.2 KG/M2 | SYSTOLIC BLOOD PRESSURE: 134 MMHG | OXYGEN SATURATION: 99 % | HEART RATE: 116 BPM

## 2023-11-20 DIAGNOSIS — I47.19 OTHER SUPRAVENTRICULAR TACHYCARDIA: ICD-10-CM

## 2023-11-20 DIAGNOSIS — Z85.3 PERSONAL HISTORY OF MALIGNANT NEOPLASM OF BREAST: ICD-10-CM

## 2023-11-20 LAB
ALBUMIN SERPL ELPH-MCNC: 4.4 G/DL
ALP BLD-CCNC: 45 U/L
ALT SERPL-CCNC: 18 U/L
ANION GAP SERPL CALC-SCNC: 12 MMOL/L
AST SERPL-CCNC: 23 U/L
BILIRUB SERPL-MCNC: 0.2 MG/DL
BUN SERPL-MCNC: 31 MG/DL
CALCIUM SERPL-MCNC: 9.8 MG/DL
CHLORIDE SERPL-SCNC: 99 MMOL/L
CO2 SERPL-SCNC: 26 MMOL/L
CREAT SERPL-MCNC: 0.68 MG/DL
EGFR: 90 ML/MIN/1.73M2
GLUCOSE SERPL-MCNC: 83 MG/DL
HCT VFR BLD CALC: 42.3 %
HGB BLD-MCNC: 13.8 G/DL
MCHC RBC-ENTMCNC: 30.3 PG
MCHC RBC-ENTMCNC: 32.6 GM/DL
MCV RBC AUTO: 92.8 FL
PLATELET # BLD AUTO: 209 K/UL
POTASSIUM SERPL-SCNC: 4.8 MMOL/L
PROT SERPL-MCNC: 7.1 G/DL
RBC # BLD: 4.56 M/UL
RBC # FLD: 13.6 %
SODIUM SERPL-SCNC: 138 MMOL/L
TSH SERPL-ACNC: 1.09 UIU/ML
WBC # FLD AUTO: 6.06 K/UL

## 2023-11-20 PROCEDURE — 99214 OFFICE O/P EST MOD 30 MIN: CPT | Mod: 25

## 2023-11-20 PROCEDURE — 93000 ELECTROCARDIOGRAM COMPLETE: CPT

## 2023-11-27 ENCOUNTER — TRANSCRIPTION ENCOUNTER (OUTPATIENT)
Age: 78
End: 2023-11-27

## 2023-11-27 ENCOUNTER — NON-APPOINTMENT (OUTPATIENT)
Age: 78
End: 2023-11-27

## 2023-11-29 ENCOUNTER — TRANSCRIPTION ENCOUNTER (OUTPATIENT)
Age: 78
End: 2023-11-29

## 2023-11-29 ENCOUNTER — APPOINTMENT (OUTPATIENT)
Dept: CARDIOLOGY | Facility: CLINIC | Age: 78
End: 2023-11-29

## 2023-12-01 ENCOUNTER — NON-APPOINTMENT (OUTPATIENT)
Age: 78
End: 2023-12-01

## 2023-12-01 ENCOUNTER — APPOINTMENT (OUTPATIENT)
Dept: CARDIOLOGY | Facility: CLINIC | Age: 78
End: 2023-12-01
Payer: MEDICARE

## 2023-12-01 VITALS
HEIGHT: 64 IN | WEIGHT: 141 LBS | BODY MASS INDEX: 24.07 KG/M2 | DIASTOLIC BLOOD PRESSURE: 69 MMHG | SYSTOLIC BLOOD PRESSURE: 124 MMHG | HEART RATE: 87 BPM | OXYGEN SATURATION: 99 %

## 2023-12-01 DIAGNOSIS — R41.9 UNSPECIFIED SYMPTOMS AND SIGNS INVOLVING COGNITIVE FUNCTIONS AND AWARENESS: ICD-10-CM

## 2023-12-01 PROCEDURE — 93000 ELECTROCARDIOGRAM COMPLETE: CPT

## 2023-12-01 PROCEDURE — 99204 OFFICE O/P NEW MOD 45 MIN: CPT

## 2023-12-21 ENCOUNTER — NON-APPOINTMENT (OUTPATIENT)
Age: 78
End: 2023-12-21

## 2023-12-22 ENCOUNTER — NON-APPOINTMENT (OUTPATIENT)
Age: 78
End: 2023-12-22

## 2023-12-22 ENCOUNTER — APPOINTMENT (OUTPATIENT)
Dept: CARDIOLOGY | Facility: CLINIC | Age: 78
End: 2023-12-22
Payer: MEDICARE

## 2023-12-22 VITALS
BODY MASS INDEX: 23.9 KG/M2 | SYSTOLIC BLOOD PRESSURE: 125 MMHG | OXYGEN SATURATION: 99 % | WEIGHT: 140 LBS | HEIGHT: 64 IN | DIASTOLIC BLOOD PRESSURE: 74 MMHG | RESPIRATION RATE: 17 BRPM | HEART RATE: 77 BPM

## 2023-12-22 DIAGNOSIS — I87.2 VENOUS INSUFFICIENCY (CHRONIC) (PERIPHERAL): ICD-10-CM

## 2023-12-22 PROCEDURE — 93000 ELECTROCARDIOGRAM COMPLETE: CPT

## 2023-12-22 PROCEDURE — 99214 OFFICE O/P EST MOD 30 MIN: CPT

## 2023-12-22 RX ORDER — METOPROLOL TARTRATE 25 MG/1
25 TABLET, FILM COATED ORAL
Qty: 90 | Refills: 0 | Status: DISCONTINUED | COMMUNITY
Start: 2023-11-20 | End: 2023-12-22

## 2023-12-22 RX ORDER — METOPROLOL SUCCINATE 25 MG/1
25 TABLET, EXTENDED RELEASE ORAL
Qty: 90 | Refills: 2 | Status: ACTIVE | COMMUNITY
Start: 2023-12-22 | End: 1900-01-01

## 2023-12-22 NOTE — DISCUSSION/SUMMARY
[FreeTextEntry1] :  1.   I reassured her that her cardiac status was stable and no further cardiac workup was needed.  I discussed the findings of the echo with the patient daughter and    2.  Changed the metoprolol to long-acting metoprolol ER 25.  Prescription sent to the pharmacy   3.  Follow-up with Dr. Ovalles   follow-up with me within 6 months [EKG obtained to assist in diagnosis and management of assessed problem(s)] : EKG obtained to assist in diagnosis and management of assessed problem(s)

## 2023-12-22 NOTE — REASON FOR VISIT
[FreeTextEntry1] : Kimberly Castanon  78 years old was seen in follow-up for an atrial tachycardia which has resolved.  She had a 2D echo today which was unremarkable except for some dilatation of the left atrium some mild diastolic dysfunction but normal LV function and no valvular disease

## 2023-12-22 NOTE — HISTORY OF PRESENT ILLNESS
[FreeTextEntry1] :  in brief, Kimberly is 78 years old with a long history of epilepsy status post brain surgery and subsequent to the brain surgery she has had a decline in mental status.  She is followed very carefully by neurology.  She has no history of diabetes but does have hypertension.  She has never had a cardiac issue or known arrhythmia.    Approximately 2 years ago she was evaluated for worsening cognitive function and was diagnosed with Alzheimer's in addition to her seizure disorder   she recently saw her geriatrician Dr. Ovalles  who noted her heart rate to be rapid.  EKG performed which I reviewed seem to show an atrial tachycardia with first-degree AV block with a heart rate of about 115.  She was placed on metoprolol 12.5 twice daily.  Again she remains asymptomatic   her other medications in addition to metoprolol 12.5 twice daily on Carbatrol 300 mg [id number] mg alendronate lorazepam olmesartan 20   EKG December 1, 2023: Normal sinus rhythm no first-degree AV block no atrial arrhythmia normal P waves and otherwise unremarkable within normal limits except for occasional APC   visit December 22, 2023: Patient returns for follow-up.  She remains asymptomatic without chest pain chest pressure shortness of breath or palpitations..  EKG again reveals sinus rhythm otherwise unremarkable within normal limits   2D echo performed today on December 22, 2023 normal left ventricular function some mild to moderate diastolic dysfunction dilatation of the left atrium no valvular disease.  There were no significant findings

## 2023-12-22 NOTE — ASSESSMENT
[FreeTextEntry1] :  the patient is clinically quite stable remains in normal sinus rhythm tolerating metoprolol 12.5 twice daily.  She has a normal echocardiogram except for some diastolic dysfunction and dilatation of the left atrium but normal LV function and no valvular disease.  She is asymptomatic   1.  History of atrial arrhythmia resolved normal echo except for diastolic dysfunction and dilatation of left atrium normal LV function no valve disease   2.  History of seizure disorder   3.  History of dementia   4.  Essential hypertension well-controlled

## 2023-12-22 NOTE — PHYSICAL EXAM
[Well Developed] : well developed [Well Nourished] : well nourished [Normal Conjunctiva] : normal conjunctiva [No Carotid Bruit] : no carotid bruit [Normal S1, S2] : normal S1, S2 [No Murmur] : no murmur [Clear Lung Fields] : clear lung fields [No Edema] : no edema [Moves all extremities] : moves all extremities [de-identified] :  awake alert no acute distress

## 2024-02-26 ENCOUNTER — RESULT REVIEW (OUTPATIENT)
Age: 79
End: 2024-02-26

## 2024-02-26 ENCOUNTER — APPOINTMENT (OUTPATIENT)
Dept: GERIATRICS | Facility: CLINIC | Age: 79
End: 2024-02-26
Payer: MEDICARE

## 2024-02-26 VITALS
HEART RATE: 85 BPM | HEIGHT: 64 IN | OXYGEN SATURATION: 99 % | DIASTOLIC BLOOD PRESSURE: 77 MMHG | SYSTOLIC BLOOD PRESSURE: 133 MMHG | TEMPERATURE: 97.5 F | WEIGHT: 143 LBS | RESPIRATION RATE: 16 BRPM | BODY MASS INDEX: 24.41 KG/M2

## 2024-02-26 DIAGNOSIS — M54.50 LOW BACK PAIN, UNSPECIFIED: ICD-10-CM

## 2024-02-26 PROCEDURE — G2211 COMPLEX E/M VISIT ADD ON: CPT

## 2024-02-26 PROCEDURE — 99214 OFFICE O/P EST MOD 30 MIN: CPT

## 2024-02-26 NOTE — PHYSICAL EXAM
[EOMI] : extraocular movements were intact [Normal Outer Ear/Nose] : the ears and nose were normal in appearance [Supple] : the neck was supple [Auscultation Breath Sounds / Voice Sounds] : lungs were clear to auscultation bilaterally [Normal S1, S2] : normal S1 and S2 [Edema] : edema was not present [Normal] : posterior cervical, supraclavicular, anterior cervical, supraclavicular, axillary [No Clubbing, Cyanosis] : no clubbing or cyanosis of the fingernails [Involuntary Movements] : no involuntary movements were seen [Motor Tone] : muscle strength and tone were normal [Normal Color / Pigmentation] : normal skin color and pigmentation [No Focal Deficits] : no focal deficits [Normal Affect] : the affect was normal [de-identified] : Antalgic gait. Mild tenderness to firm palpation left lumbar paraspinal region, with tenderness along the upper border of left iliac crest

## 2024-02-26 NOTE — HISTORY OF PRESENT ILLNESS
[FreeTextEntry1] : Mrs. Castanon presents for follow-up accompanied by her daughter Dottie.  The patient states that she has been experiencing more low back pain radiating to the left side for the past month or so.  She states that she gets this every winter, for which she takes Tylenol.  Also notes chronic left knee pain.  Patient states that she fell 3 nights ago, does not know if she fell getting in or out of the bed.  It happened during the night, probably during a trip to the bathroom.  She states that the pain in the back worsened a bit.  The fall was unwitnessed, she called out to her  to help as she was not able to get up on her own.  Of note the patient had a seizure roughly 3 weeks ago.  Patient recently saw her endocrinologist.  She is scheduled for an EEG.  Daughter states patient had a mammogram. [Any fall with injury in past year] : Patient reported fall with injury in the past year

## 2024-02-26 NOTE — REVIEW OF SYSTEMS
[Eye Pain] : no eye pain [As Noted in HPI] : as noted in HPI [Confused] : confusion [Negative] : Endocrine

## 2024-02-27 LAB
25(OH)D3 SERPL-MCNC: 50.6 NG/ML
ALBUMIN SERPL ELPH-MCNC: 4.5 G/DL
ALP BLD-CCNC: 41 U/L
ALT SERPL-CCNC: 18 U/L
ANION GAP SERPL CALC-SCNC: 11 MMOL/L
AST SERPL-CCNC: 26 U/L
BASOPHILS # BLD AUTO: 0.08 K/UL
BASOPHILS NFR BLD AUTO: 1.4 %
BILIRUB SERPL-MCNC: 0.2 MG/DL
BUN SERPL-MCNC: 34 MG/DL
CALCIUM SERPL-MCNC: 9.7 MG/DL
CALCIUM SERPL-MCNC: 9.7 MG/DL
CARBAMAZEPINE SERPL-MCNC: 10.4 UG/ML
CHLORIDE SERPL-SCNC: 101 MMOL/L
CO2 SERPL-SCNC: 27 MMOL/L
CREAT SERPL-MCNC: 0.6 MG/DL
EGFR: 92 ML/MIN/1.73M2
EOSINOPHIL # BLD AUTO: 0.11 K/UL
EOSINOPHIL NFR BLD AUTO: 1.9 %
GLUCOSE SERPL-MCNC: 79 MG/DL
HCT VFR BLD CALC: 41.9 %
HGB BLD-MCNC: 13.1 G/DL
IMM GRANULOCYTES NFR BLD AUTO: 0.2 %
LYMPHOCYTES # BLD AUTO: 1.35 K/UL
LYMPHOCYTES NFR BLD AUTO: 23.6 %
MAN DIFF?: NORMAL
MCHC RBC-ENTMCNC: 30 PG
MCHC RBC-ENTMCNC: 31.3 GM/DL
MCV RBC AUTO: 95.9 FL
MONOCYTES # BLD AUTO: 0.69 K/UL
MONOCYTES NFR BLD AUTO: 12.1 %
NEUTROPHILS # BLD AUTO: 3.48 K/UL
NEUTROPHILS NFR BLD AUTO: 60.8 %
PARATHYROID HORMONE INTACT: 34 PG/ML
PLATELET # BLD AUTO: 228 K/UL
POTASSIUM SERPL-SCNC: 4.5 MMOL/L
PROT SERPL-MCNC: 7.3 G/DL
RBC # BLD: 4.37 M/UL
RBC # FLD: 14.7 %
SODIUM SERPL-SCNC: 139 MMOL/L
VALPROATE SERPL-MCNC: 36 UG/ML
WBC # FLD AUTO: 5.72 K/UL

## 2024-03-01 ENCOUNTER — APPOINTMENT (OUTPATIENT)
Dept: RADIOLOGY | Facility: CLINIC | Age: 79
End: 2024-03-01
Payer: MEDICARE

## 2024-03-01 ENCOUNTER — RESULT REVIEW (OUTPATIENT)
Age: 79
End: 2024-03-01

## 2024-03-01 PROCEDURE — 72070 X-RAY EXAM THORAC SPINE 2VWS: CPT

## 2024-03-01 PROCEDURE — 72190 X-RAY EXAM OF PELVIS: CPT

## 2024-03-01 PROCEDURE — 72100 X-RAY EXAM L-S SPINE 2/3 VWS: CPT

## 2024-03-02 ENCOUNTER — NON-APPOINTMENT (OUTPATIENT)
Age: 79
End: 2024-03-02

## 2024-03-12 ENCOUNTER — APPOINTMENT (OUTPATIENT)
Dept: NEUROLOGY | Facility: CLINIC | Age: 79
End: 2024-03-12
Payer: MEDICARE

## 2024-03-12 PROCEDURE — 95816 EEG AWAKE AND DROWSY: CPT

## 2024-03-13 ENCOUNTER — APPOINTMENT (OUTPATIENT)
Dept: NEUROLOGY | Facility: CLINIC | Age: 79
End: 2024-03-13
Payer: MEDICARE

## 2024-03-13 PROCEDURE — 95719 EEG PHYS/QHP EA INCR W/O VID: CPT

## 2024-03-13 PROCEDURE — 95708 EEG WO VID EA 12-26HR UNMNTR: CPT

## 2024-03-13 PROCEDURE — 95700 EEG CONT REC W/VID EEG TECH: CPT

## 2024-03-14 PROCEDURE — 95708 EEG WO VID EA 12-26HR UNMNTR: CPT

## 2024-03-14 PROCEDURE — 95717 EEG PHYS/QHP 2-12 HR W/O VID: CPT

## 2024-03-14 PROCEDURE — 95705 EEG W/O VID 2-12 HR UNMNTR: CPT

## 2024-03-14 PROCEDURE — 95700 EEG CONT REC W/VID EEG TECH: CPT

## 2024-03-14 PROCEDURE — 95719 EEG PHYS/QHP EA INCR W/O VID: CPT

## 2024-03-25 ENCOUNTER — APPOINTMENT (OUTPATIENT)
Dept: NEUROLOGY | Facility: CLINIC | Age: 79
End: 2024-03-25
Payer: MEDICARE

## 2024-03-25 DIAGNOSIS — F03.90 UNSPECIFIED DEMENTIA W/OUT BEHAVIORAL DISTURBANCE: ICD-10-CM

## 2024-03-25 PROCEDURE — G2211 COMPLEX E/M VISIT ADD ON: CPT

## 2024-03-25 PROCEDURE — 99213 OFFICE O/P EST LOW 20 MIN: CPT

## 2024-03-25 NOTE — REASON FOR VISIT
[Home] : at home, [unfilled] , at the time of the visit. [Patient] : the patient [Family Member] : family member [Follow-Up: _____] : a [unfilled] follow-up visit

## 2024-03-25 NOTE — HISTORY OF PRESENT ILLNESS
[FreeTextEntry1] : Kimberly presents today for a follow up visit of Epilepsy s/p right frontotemporal ablation in 2003. Recent NPT from 2/2022 shows Major neurocognitive disorder (Dementia).   Diagnostic testing review:  -Brain MRI from 12/2019 was suspicious for neoplasm to the left frontal bone, but had a PET scan that did not show signs of malignancy.  -VEEG 11/2020 showed mild to moderate gen slowing over bi-temporal regions. Rare spikes over left temporal region. - AEEG 1/2022 showed mild gen background slowing and frequent left temporal polymorphic delta slowing during drowsiness with superimposed sharp waves, not clearly epileptiform.   -NPT in 10/2020 and consistent with mild neurocognitive disorder that is multifactorial. Was repeated in 2/2022 with Dr. Thomas who felt there was a prominent decline since her last evaluation. She now meets criteria for a major neurocognitive disorder.   _____________________________________________ Last reported seizure was at the end of January. Daughter describes seizures as grand-mal seizure- lasting about 1 minute and 30 seconds. Her caregiver said the patient was lying down and shaking and convulsing.  Send for AEEG which was reported as showing rare left temporal sharp-wave discharges and mild generalized slowing. Serum as follows: Carbamazepine: 10.4 (4-12), VPA: 36 ().   No further seizures reported but did have an incident where she fell out of bed at night at which time she appeared to be going to he bathroom. She remains compliant with medication regimen.   Has not been sleeping well and is waking up often due to using the bathroom and from arthritic pain. Has been taking Melatonin 6mg with minimal improvement.  ____________________________________________ Current medication regimen:  Carbatrol 600mg TID  VPA: 750-500mg

## 2024-03-25 NOTE — DISCUSSION/SUMMARY
[FreeTextEntry1] : History of Epilepsy and Dementia.   Had breakthrough seizure at the end of January 2024. AEEG showing rare left temporal sharp-wave discharges and mild generalized slowing. VPA level is currently subtheraputic, will increase dose.   Dementia stable, no changes.   Plan:  -Continue Carbamazepine 600mg TID -Increase VPA from 750-500 to 750mg BID -Repeat AED levels in 2 weeks  -Continue melatonin 6mg, can increase to 9mg -TEB 4/22 at 10am   All questions and concerns were addressed to the best of my ability. Emotional support was rendered.

## 2024-04-02 ENCOUNTER — APPOINTMENT (OUTPATIENT)
Dept: NEUROLOGY | Facility: CLINIC | Age: 79
End: 2024-04-02

## 2024-04-18 LAB — VALPROATE SERPL-MCNC: 53 UG/ML

## 2024-04-22 ENCOUNTER — APPOINTMENT (OUTPATIENT)
Dept: NEUROLOGY | Facility: CLINIC | Age: 79
End: 2024-04-22
Payer: MEDICARE

## 2024-04-22 PROCEDURE — 99213 OFFICE O/P EST LOW 20 MIN: CPT

## 2024-04-22 PROCEDURE — G2211 COMPLEX E/M VISIT ADD ON: CPT

## 2024-04-22 NOTE — REASON FOR VISIT
[Home] : at home, [unfilled] , at the time of the visit. [Spouse] : spouse [Other:____] : [unfilled] [Patient] : the patient [Follow-Up: _____] : a [unfilled] follow-up visit
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
## 2024-06-03 ENCOUNTER — APPOINTMENT (OUTPATIENT)
Dept: GERIATRICS | Facility: CLINIC | Age: 79
End: 2024-06-03
Payer: MEDICARE

## 2024-06-03 ENCOUNTER — NON-APPOINTMENT (OUTPATIENT)
Age: 79
End: 2024-06-03

## 2024-06-03 VITALS
HEART RATE: 85 BPM | BODY MASS INDEX: 23.52 KG/M2 | SYSTOLIC BLOOD PRESSURE: 102 MMHG | RESPIRATION RATE: 16 BRPM | OXYGEN SATURATION: 96 % | WEIGHT: 137 LBS | TEMPERATURE: 97.6 F | DIASTOLIC BLOOD PRESSURE: 67 MMHG

## 2024-06-03 DIAGNOSIS — M81.0 AGE-RELATED OSTEOPOROSIS W/OUT CURRENT PATHOLOGICAL FRACTURE: ICD-10-CM

## 2024-06-03 DIAGNOSIS — R63.4 ABNORMAL WEIGHT LOSS: ICD-10-CM

## 2024-06-03 DIAGNOSIS — I10 ESSENTIAL (PRIMARY) HYPERTENSION: ICD-10-CM

## 2024-06-03 DIAGNOSIS — I47.19 OTHER SUPRAVENTRICULAR TACHYCARDIA: ICD-10-CM

## 2024-06-03 PROCEDURE — G2211 COMPLEX E/M VISIT ADD ON: CPT

## 2024-06-03 PROCEDURE — 99214 OFFICE O/P EST MOD 30 MIN: CPT

## 2024-06-03 RX ORDER — ALENDRONATE SODIUM 70 MG/1
70 TABLET ORAL
Qty: 12 | Refills: 0 | Status: DISCONTINUED | COMMUNITY
Start: 2022-01-19 | End: 2024-06-03

## 2024-06-03 RX ORDER — ZOLEDRONIC ACID 5 MG/100ML
5 INJECTION INTRAVENOUS
Qty: 100 | Refills: 0 | Status: ACTIVE | COMMUNITY
Start: 2024-06-03

## 2024-06-03 RX ORDER — DIVALPROEX SODIUM 250 MG/1
250 TABLET, DELAYED RELEASE ORAL
Qty: 630 | Refills: 1 | Status: ACTIVE | COMMUNITY
Start: 2022-04-08 | End: 1900-01-01

## 2024-06-03 NOTE — PHYSICAL EXAM
[EOMI] : extraocular movements were intact [Normal Outer Ear/Nose] : the ears and nose were normal in appearance [Supple] : the neck was supple [Auscultation Breath Sounds / Voice Sounds] : lungs were clear to auscultation bilaterally [Normal S1, S2] : normal S1 and S2 [Edema] : edema was not present [Normal] : posterior cervical, supraclavicular, anterior cervical, supraclavicular, axillary [No Clubbing, Cyanosis] : no clubbing or cyanosis of the fingernails [Involuntary Movements] : no involuntary movements were seen [Motor Tone] : muscle strength and tone were normal [Normal Color / Pigmentation] : normal skin color and pigmentation [No Focal Deficits] : no focal deficits [Normal Affect] : the affect was normal

## 2024-06-03 NOTE — REASON FOR VISIT
[Follow-Up] : a follow-up visit [FreeTextEntry1] : Hypertension, seizure disorder, atrial tachycardia

## 2024-06-03 NOTE — HISTORY OF PRESENT ILLNESS
[FreeTextEntry1] : Mrs. Castanon presents for follow-up accompanied by her daughter Dottie who assists with history.  History obtained, medications reconciled.  Dose of seizure medications was increased, levels seem to be therapeutic. Patient had a probable seizure earlier this morning.  Her daughter was going to cancel the visit because usually seizures are followed by a "bad" day, however when Dottie went to see her mother, the patient was quite bright and well-appearing. The patient offers no new complaints.  She denies shortness of breath, cough, sputum production.  Denies palpitations.  Denies abdominal pain, denies dysuria. The patient is a community ambulator.  She usually walks around the block, roughly 15 minutes daily, but sometimes walks much farther, always accompanied by spouse or aide.  She has aides 10 to 12 hours 7 days a week  [No falls in past year] : Patient reported no falls in the past year

## 2024-06-14 ENCOUNTER — APPOINTMENT (OUTPATIENT)
Dept: NEUROLOGY | Facility: CLINIC | Age: 79
End: 2024-06-14
Payer: MEDICARE

## 2024-06-14 DIAGNOSIS — G40.909 EPILEPSY, UNSPECIFIED, NOT INTRACTABLE, W/OUT STATUS EPILEPTICUS: ICD-10-CM

## 2024-06-14 PROCEDURE — 99213 OFFICE O/P EST LOW 20 MIN: CPT

## 2024-06-14 PROCEDURE — G2211 COMPLEX E/M VISIT ADD ON: CPT

## 2024-06-14 NOTE — DISCUSSION/SUMMARY
[FreeTextEntry1] : Epilepsy and Dementia   Dementia appears slowly progressive per daughter, no treatment. Did not tolerate Namenda or Aricept in the past  last seizure was on 6/3. VPA increase from 750mg BID to 750-1000mg. Doing well at this time. Will hold off on any AED changes. Will set up RPA to further evaluate patient in person. Daughter will reach out with any changes.   All questions and concerns were addressed to the best of my ability. Emotional support was rendered.

## 2024-06-14 NOTE — HISTORY OF PRESENT ILLNESS
[FreeTextEntry1] : Kimberly presents today for a follow up visit of Epilepsy s/p right frontotemporal ablation in 2003. Recent NPT from 2/2022 shows Major neurocognitive disorder (Dementia).   Diagnostic testing review:  -Brain MRI from 12/2019 was suspicious for neoplasm to the left frontal bone, but had a PET scan that did not show signs of malignancy.  -VEEG 11/2020 showed mild to moderate gen slowing over bi-temporal regions. Rare spikes over left temporal region. - AEEG 1/2022 showed mild gen background slowing and frequent left temporal polymorphic delta slowing during drowsiness with superimposed sharp waves, not clearly epileptiform.   -NPT in 10/2020 and consistent with mild neurocognitive disorder that is multifactorial. Was repeated in 2/2022 with Dr. Thomas who felt there was a prominent decline since her last evaluation. She now meets criteria for a major neurocognitive disorder.   _____________________________________________ Called the office on 06/03 due to breakthrough seizure. Instructed to increase VPA from 750mg BID to 750-1000mg. No further seizure reported and is tolerating medication adjustment well.   Last reported seizure prior to this was on 4/5. VPA levels at that time was 53 and VPA was increased from 750-500mg to 750mg BID.   03/2024 AEEG showing rare left temporal sharp-wave discharges and mild generalized slowing. Serum as follows: Carbamazepine: 10.4 (4-12), VPA: 36 (). VPA was increase to 750mg BID about 10 days before the seizure and current level is 53.   ____________________________________________ Current medication regimen:  Carbatrol 600mg TID  VPA: 750mg-1000mg

## 2024-06-14 NOTE — REASON FOR VISIT
[Home] : at home, [unfilled] , at the time of the visit. [Family Member] : family member [Follow-Up: _____] : a [unfilled] follow-up visit

## 2024-06-23 LAB
ALBUMIN SERPL ELPH-MCNC: 4.1 G/DL
ALP BLD-CCNC: 44 U/L
ALT SERPL-CCNC: 22 U/L
AMMONIA PLAS-MCNC: 48 UMOL/L
ANION GAP SERPL CALC-SCNC: 13 MMOL/L
AST SERPL-CCNC: 26 U/L
BILIRUB SERPL-MCNC: 0.2 MG/DL
BUN SERPL-MCNC: 29 MG/DL
CALCIUM SERPL-MCNC: 9.5 MG/DL
CARBAMAZEPINE SERPL-MCNC: 9.4 UG/ML
CHLORIDE SERPL-SCNC: 99 MMOL/L
CO2 SERPL-SCNC: 24 MMOL/L
CREAT SERPL-MCNC: 0.67 MG/DL
EGFR: 90 ML/MIN/1.73M2
GLUCOSE SERPL-MCNC: 129 MG/DL
POTASSIUM SERPL-SCNC: 5.2 MMOL/L
PROT SERPL-MCNC: 6.9 G/DL
SODIUM SERPL-SCNC: 137 MMOL/L
VALPROATE SERPL-MCNC: 52 UG/ML

## 2024-07-05 ENCOUNTER — NON-APPOINTMENT (OUTPATIENT)
Age: 79
End: 2024-07-05

## 2024-07-05 ENCOUNTER — TRANSCRIPTION ENCOUNTER (OUTPATIENT)
Age: 79
End: 2024-07-05

## 2024-07-09 ENCOUNTER — APPOINTMENT (OUTPATIENT)
Dept: NEUROLOGY | Facility: CLINIC | Age: 79
End: 2024-07-09
Payer: MEDICARE

## 2024-07-09 VITALS
DIASTOLIC BLOOD PRESSURE: 73 MMHG | SYSTOLIC BLOOD PRESSURE: 138 MMHG | WEIGHT: 133 LBS | HEART RATE: 73 BPM | BODY MASS INDEX: 22.71 KG/M2 | HEIGHT: 64 IN

## 2024-07-09 PROCEDURE — 99214 OFFICE O/P EST MOD 30 MIN: CPT

## 2024-07-09 PROCEDURE — G2211 COMPLEX E/M VISIT ADD ON: CPT

## 2024-07-09 RX ORDER — MIDAZOLAM 5 MG/.1ML
5 SPRAY NASAL
Qty: 4 | Refills: 0 | Status: ACTIVE | COMMUNITY
Start: 2024-07-09 | End: 1900-01-01

## 2024-07-16 ENCOUNTER — APPOINTMENT (OUTPATIENT)
Dept: GERIATRICS | Facility: CLINIC | Age: 79
End: 2024-07-16
Payer: MEDICARE

## 2024-07-16 ENCOUNTER — NON-APPOINTMENT (OUTPATIENT)
Age: 79
End: 2024-07-16

## 2024-07-16 VITALS
HEART RATE: 68 BPM | DIASTOLIC BLOOD PRESSURE: 65 MMHG | RESPIRATION RATE: 15 BRPM | TEMPERATURE: 97 F | OXYGEN SATURATION: 98 % | SYSTOLIC BLOOD PRESSURE: 108 MMHG

## 2024-07-16 DIAGNOSIS — G40.909 EPILEPSY, UNSPECIFIED, NOT INTRACTABLE, W/OUT STATUS EPILEPTICUS: ICD-10-CM

## 2024-07-16 DIAGNOSIS — J18.9 PNEUMONIA, UNSPECIFIED ORGANISM: ICD-10-CM
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
## 2024-07-16 PROCEDURE — G2211 COMPLEX E/M VISIT ADD ON: CPT

## 2024-07-16 PROCEDURE — 99214 OFFICE O/P EST MOD 30 MIN: CPT

## 2024-07-16 RX ORDER — AZITHROMYCIN 250 MG/1
250 TABLET, FILM COATED ORAL
Qty: 6 | Refills: 0 | Status: ACTIVE | COMMUNITY
Start: 2024-07-16 | End: 1900-01-01

## 2024-07-16 RX ORDER — AMOXICILLIN 500 MG/1
500 CAPSULE ORAL EVERY 8 HOURS
Qty: 36 | Refills: 0 | Status: ACTIVE | COMMUNITY
Start: 2024-07-16 | End: 1900-01-01

## 2024-07-17 ENCOUNTER — TRANSCRIPTION ENCOUNTER (OUTPATIENT)
Age: 79
End: 2024-07-17

## 2024-07-18 LAB
ALBUMIN SERPL ELPH-MCNC: 4.3 G/DL
ALP BLD-CCNC: 45 U/L
ALT SERPL-CCNC: 20 U/L
ANION GAP SERPL CALC-SCNC: 12 MMOL/L
AST SERPL-CCNC: 21 U/L
BASOPHILS # BLD AUTO: 0.07 K/UL
BASOPHILS NFR BLD AUTO: 0.9 %
BILIRUB SERPL-MCNC: 0.3 MG/DL
BUN SERPL-MCNC: 29 MG/DL
CALCIUM SERPL-MCNC: 8.8 MG/DL
CHLORIDE SERPL-SCNC: 99 MMOL/L
CO2 SERPL-SCNC: 26 MMOL/L
CREAT SERPL-MCNC: 0.68 MG/DL
EGFR: 90 ML/MIN/1.73M2
EOSINOPHIL # BLD AUTO: 0.2 K/UL
EOSINOPHIL NFR BLD AUTO: 2.5 %
GLUCOSE SERPL-MCNC: 65 MG/DL
HCT VFR BLD CALC: 40.4 %
HGB BLD-MCNC: 12.7 G/DL
IMM GRANULOCYTES NFR BLD AUTO: 0.6 %
LYMPHOCYTES # BLD AUTO: 1.32 K/UL
LYMPHOCYTES NFR BLD AUTO: 16.6 %
MAN DIFF?: NORMAL
MCHC RBC-ENTMCNC: 29.4 PG
MCHC RBC-ENTMCNC: 31.4 GM/DL
MCV RBC AUTO: 93.5 FL
MONOCYTES # BLD AUTO: 1.09 K/UL
MONOCYTES NFR BLD AUTO: 13.7 %
NEUTROPHILS # BLD AUTO: 5.23 K/UL
NEUTROPHILS NFR BLD AUTO: 65.7 %
PLATELET # BLD AUTO: 226 K/UL
POTASSIUM SERPL-SCNC: 5 MMOL/L
PROT SERPL-MCNC: 7 G/DL
RBC # BLD: 4.32 M/UL
RBC # FLD: 14.4 %
SODIUM SERPL-SCNC: 136 MMOL/L
WBC # FLD AUTO: 7.96 K/UL

## 2024-07-19 LAB
ALBUMIN SERPL ELPH-MCNC: 4.2 G/DL
ALP BLD-CCNC: 43 U/L
ALT SERPL-CCNC: 18 U/L
AMMONIA PLAS-MCNC: 81.8 UMOL/L
ANION GAP SERPL CALC-SCNC: 13 MMOL/L
AST SERPL-CCNC: 24 U/L
BILIRUB SERPL-MCNC: 0.3 MG/DL
BUN SERPL-MCNC: 21 MG/DL
CO2 SERPL-SCNC: 23 MMOL/L
CREAT SERPL-MCNC: 0.66 MG/DL
EGFR: 90 ML/MIN/1.73M2
FOLATE SERPL-MCNC: 18.7 NG/ML
GLUCOSE SERPL-MCNC: 77 MG/DL
POTASSIUM SERPL-SCNC: 4.8 MMOL/L
PROT SERPL-MCNC: 6.7 G/DL
SODIUM SERPL-SCNC: 137 MMOL/L
TSH SERPL-ACNC: 1.24 UIU/ML
VIT B12 SERPL-MCNC: 1408 PG/ML

## 2024-07-22 LAB
CARBAMAZEPINE SERPL-MCNC: 7.6 UG/ML
VALPROATE SERPL-MCNC: 56 UG/ML

## 2024-07-29 ENCOUNTER — NON-APPOINTMENT (OUTPATIENT)
Age: 79
End: 2024-07-29

## 2024-07-29 RX ORDER — LEVOCARNITINE 330 MG/1
330 TABLET ORAL
Qty: 180 | Refills: 5 | Status: ACTIVE | COMMUNITY
Start: 2024-07-29 | End: 1900-01-01

## 2024-08-04 ENCOUNTER — NON-APPOINTMENT (OUTPATIENT)
Age: 79
End: 2024-08-04

## 2024-08-05 ENCOUNTER — TRANSCRIPTION ENCOUNTER (OUTPATIENT)
Age: 79
End: 2024-08-05

## 2024-08-14 ENCOUNTER — TRANSCRIPTION ENCOUNTER (OUTPATIENT)
Age: 79
End: 2024-08-14

## 2024-08-21 ENCOUNTER — NON-APPOINTMENT (OUTPATIENT)
Age: 79
End: 2024-08-21

## 2024-08-21 ENCOUNTER — TRANSCRIPTION ENCOUNTER (OUTPATIENT)
Age: 79
End: 2024-08-21

## 2024-08-21 DIAGNOSIS — R13.10 DYSPHAGIA, UNSPECIFIED: ICD-10-CM

## 2024-08-21 DIAGNOSIS — G40.909 EPILEPSY, UNSPECIFIED, NOT INTRACTABLE, W/OUT STATUS EPILEPTICUS: ICD-10-CM

## 2024-08-21 RX ORDER — LEVOCARNITINE 330 MG/1
330 TABLET ORAL
Qty: 150 | Refills: 1 | Status: ACTIVE | COMMUNITY
Start: 2024-08-21 | End: 1900-01-01

## 2024-08-26 ENCOUNTER — TRANSCRIPTION ENCOUNTER (OUTPATIENT)
Age: 79
End: 2024-08-26

## 2024-08-26 ENCOUNTER — RX RENEWAL (OUTPATIENT)
Age: 79
End: 2024-08-26

## 2024-08-26 ENCOUNTER — NON-APPOINTMENT (OUTPATIENT)
Age: 79
End: 2024-08-26

## 2024-08-27 ENCOUNTER — APPOINTMENT (OUTPATIENT)
Dept: NEUROLOGY | Facility: CLINIC | Age: 79
End: 2024-08-27

## 2024-08-27 ENCOUNTER — TRANSCRIPTION ENCOUNTER (OUTPATIENT)
Age: 79
End: 2024-08-27

## 2024-08-27 PROCEDURE — 95816 EEG AWAKE AND DROWSY: CPT

## 2024-08-28 PROCEDURE — 95719 EEG PHYS/QHP EA INCR W/O VID: CPT

## 2024-08-28 PROCEDURE — 95708 EEG WO VID EA 12-26HR UNMNTR: CPT

## 2024-08-28 PROCEDURE — 95700 EEG CONT REC W/VID EEG TECH: CPT

## 2024-08-30 ENCOUNTER — APPOINTMENT (OUTPATIENT)
Dept: MRI IMAGING | Facility: CLINIC | Age: 79
End: 2024-08-30

## 2024-08-30 ENCOUNTER — OUTPATIENT (OUTPATIENT)
Dept: OUTPATIENT SERVICES | Facility: HOSPITAL | Age: 79
LOS: 1 days | End: 2024-08-30
Payer: MEDICARE

## 2024-08-30 DIAGNOSIS — Z98.890 OTHER SPECIFIED POSTPROCEDURAL STATES: Chronic | ICD-10-CM

## 2024-08-30 DIAGNOSIS — G40.909 EPILEPSY, UNSPECIFIED, NOT INTRACTABLE, WITHOUT STATUS EPILEPTICUS: ICD-10-CM

## 2024-08-30 PROCEDURE — 70553 MRI BRAIN STEM W/O & W/DYE: CPT | Mod: 26,MH

## 2024-08-30 PROCEDURE — A9585: CPT

## 2024-08-30 PROCEDURE — 70553 MRI BRAIN STEM W/O & W/DYE: CPT

## 2024-09-08 ENCOUNTER — NON-APPOINTMENT (OUTPATIENT)
Age: 79
End: 2024-09-08

## 2024-09-10 ENCOUNTER — TRANSCRIPTION ENCOUNTER (OUTPATIENT)
Age: 79
End: 2024-09-10

## 2024-09-10 DIAGNOSIS — R26.81 UNSTEADINESS ON FEET: ICD-10-CM

## 2024-09-11 ENCOUNTER — APPOINTMENT (OUTPATIENT)
Dept: NEUROLOGY | Facility: CLINIC | Age: 79
End: 2024-09-11
Payer: MEDICARE

## 2024-09-11 DIAGNOSIS — G40.909 EPILEPSY, UNSPECIFIED, NOT INTRACTABLE, W/OUT STATUS EPILEPTICUS: ICD-10-CM

## 2024-09-11 PROCEDURE — 99213 OFFICE O/P EST LOW 20 MIN: CPT

## 2024-09-11 PROCEDURE — G2211 COMPLEX E/M VISIT ADD ON: CPT

## 2024-09-11 RX ORDER — RAMELTEON 8 MG/1
8 TABLET ORAL
Qty: 15 | Refills: 1 | Status: ACTIVE | COMMUNITY
Start: 2024-09-11 | End: 1900-01-01

## 2024-09-12 NOTE — HISTORY OF PRESENT ILLNESS
[FreeTextEntry1] : Kimberly presents today for a follow up visit of Epilepsy s/p right frontotemporal ablation in 2003. Recent NPT from 2/2022 shows Major neurocognitive disorder (Dementia).   Diagnostic testing review:  -Brain MRI from 12/2019 was suspicious for neoplasm to the left frontal bone, but had a PET scan that did not show signs of malignancy.  -VEEG 11/2020 showed mild to moderate gen slowing over bi-temporal regions. Rare spikes over left temporal region. - AEEG 1/2022 showed mild gen background slowing and frequent left temporal polymorphic delta slowing during drowsiness with superimposed sharp waves, not clearly epileptiform.   -NPT in 10/2020 and consistent with mild neurocognitive disorder that is multifactorial. Was repeated in 2/2022 with Dr. Thomas who felt there was a prominent decline since her last evaluation. She now meets criteria for a major neurocognitive disorder.   _____________________________________________ Continues to have breakthrough seizures every few weeks. Spouse reports sudden onset of eye rolling with right hand shaking lasting about 1 minute with post-ictal fatigue. VPA was increased to 1g BID in July, Recent AEEG from 8/27-8/28 only showing moderate diffuse slowing and rare spike waves over the left temporal region. MRI brain from 8/30 was stable Since prior MRI brain 12/16/2019, stable exam. No acute infarction, hemorrhage or mass effect. Stable postsurgical changes as above.  After increasing VPA, NH4 levels increased to 81.8 (11-55) and Carnitior dosing was increased.   Dementia continues to be slowly progressive. Did not tolerate Namenda or Aricept in the past due to side effects of dizziness. Continue to report episodes of confusion and waking up often in the middle of the night. Taking Melatonin 9mg without changes.   She denies any new neurological complaints at this time.  ____________________________________________ Current medication regimen:  Carbatrol 600mg TID Level of 7.6 (4-12) from 7/24 VPA: 1g BID Level of 56 (( from 7/24.

## 2024-09-12 NOTE — DISCUSSION/SUMMARY
[FreeTextEntry1] : Epilepsy and Dementia   Dementia continues to bee slowly progressive per daughter, no treatment. Did not tolerate Namenda or Aricept in the past. Poor night time sleep likely contributory. Discussed starting Ramelteon with PCP .   Plan:  -Cont VPA 1g BID -Continue Carbamazepine 600mg TID -Nyalzolam spray PRN seizure lasting more then 3 minutes or clustering  -Repeat NH4 levels - Follow up in 8 weeks   All questions and concerns were addressed to the best of my ability. Emotional support was rendered. Dr. Najjar was present for today's visit.

## 2024-09-20 LAB — AMMONIA PLAS-MCNC: 42.3 UMOL/L

## 2024-09-23 ENCOUNTER — NON-APPOINTMENT (OUTPATIENT)
Age: 79
End: 2024-09-23

## 2024-10-02 ENCOUNTER — APPOINTMENT (OUTPATIENT)
Dept: GERIATRICS | Facility: CLINIC | Age: 79
End: 2024-10-02
Payer: MEDICARE

## 2024-10-02 VITALS
HEIGHT: 64 IN | BODY MASS INDEX: 22.88 KG/M2 | DIASTOLIC BLOOD PRESSURE: 80 MMHG | TEMPERATURE: 97.1 F | SYSTOLIC BLOOD PRESSURE: 119 MMHG | WEIGHT: 134 LBS | OXYGEN SATURATION: 98 % | HEART RATE: 80 BPM | RESPIRATION RATE: 16 BRPM

## 2024-10-02 DIAGNOSIS — F03.90 UNSPECIFIED DEMENTIA W/OUT BEHAVIORAL DISTURBANCE: ICD-10-CM

## 2024-10-02 DIAGNOSIS — R26.81 UNSTEADINESS ON FEET: ICD-10-CM

## 2024-10-02 DIAGNOSIS — G40.909 EPILEPSY, UNSPECIFIED, NOT INTRACTABLE, W/OUT STATUS EPILEPTICUS: ICD-10-CM

## 2024-10-02 DIAGNOSIS — M81.0 AGE-RELATED OSTEOPOROSIS W/OUT CURRENT PATHOLOGICAL FRACTURE: ICD-10-CM

## 2024-10-02 DIAGNOSIS — I10 ESSENTIAL (PRIMARY) HYPERTENSION: ICD-10-CM

## 2024-10-02 PROCEDURE — 90662 IIV NO PRSV INCREASED AG IM: CPT

## 2024-10-02 PROCEDURE — G0008: CPT

## 2024-10-02 PROCEDURE — G2211 COMPLEX E/M VISIT ADD ON: CPT

## 2024-10-02 PROCEDURE — 99214 OFFICE O/P EST MOD 30 MIN: CPT

## 2024-10-02 NOTE — PHYSICAL EXAM
[EOMI] : extraocular movements were intact [PERRL] : pupils were equal in size, round, and reactive to light [Normal Outer Ear/Nose] : the ears and nose were normal in appearance [Auscultation Breath Sounds / Voice Sounds] : lungs were clear to auscultation bilaterally [Normal S1, S2] : normal S1 and S2 [Edema] : edema was not present [Normal] : normal gait, no clubbing or cyanosis of the fingernails, muscle strength and tone were normal, no involuntary movements were seen [Normal Color / Pigmentation] : normal skin color and pigmentation [No Focal Deficits] : no focal deficits [Normal Affect] : the affect was normal [FreeTextEntry1] : Keila malhotra

## 2024-10-02 NOTE — HISTORY OF PRESENT ILLNESS
[FreeTextEntry1] : Mrs. Castanon presents for follow-up accompanied by her daughter Dottie.  Patient feels well.  She offers no new complaints.  Cough has resolved.  Last fall was in August.  Now using a rollator walker and seems to be doing much better, able to ambulate outdoors with greater confidence.  Also uses a shower chair.  Patient averages about 1 seizure a month, followed by neurology. Tolerating ramelteon 8 mg nightly, seems to have decreased the number of nighttime awakenings from about 5 to 3 episodes [Any fall with injury in past year] : Patient reported fall with injury in the past year [Independent] : transferring/mobility [Full assistance needed] : Assistance needed managing medications [] : Assistance needed managing finances. [Little interest or pleasure doing things] : 1) Little interest or pleasure doing things [Feeling down, depressed, or hopeless] : 2) Feeling down, depressed, or hopeless [0] : 2) Feeling down, depressed, or hopeless: Not at all (0) [PHQ-2 Negative - No further assessment needed] : PHQ-2 Negative - No further assessment needed [VMD2Weial] : 0

## 2024-10-14 ENCOUNTER — NON-APPOINTMENT (OUTPATIENT)
Age: 79
End: 2024-10-14

## 2024-10-15 ENCOUNTER — TRANSCRIPTION ENCOUNTER (OUTPATIENT)
Age: 79
End: 2024-10-15

## 2024-10-17 ENCOUNTER — TRANSCRIPTION ENCOUNTER (OUTPATIENT)
Age: 79
End: 2024-10-17

## 2024-10-28 ENCOUNTER — TRANSCRIPTION ENCOUNTER (OUTPATIENT)
Age: 79
End: 2024-10-28

## 2024-11-18 ENCOUNTER — TRANSCRIPTION ENCOUNTER (OUTPATIENT)
Age: 79
End: 2024-11-18

## 2024-11-30 ENCOUNTER — RX RENEWAL (OUTPATIENT)
Age: 79
End: 2024-11-30

## 2024-12-09 ENCOUNTER — RX RENEWAL (OUTPATIENT)
Age: 79
End: 2024-12-09

## 2024-12-31 ENCOUNTER — TRANSCRIPTION ENCOUNTER (OUTPATIENT)
Age: 79
End: 2024-12-31

## 2024-12-31 ENCOUNTER — NON-APPOINTMENT (OUTPATIENT)
Age: 79
End: 2024-12-31

## 2025-01-06 ENCOUNTER — APPOINTMENT (OUTPATIENT)
Dept: GERIATRICS | Facility: CLINIC | Age: 80
End: 2025-01-06

## 2025-01-06 VITALS
OXYGEN SATURATION: 99 % | SYSTOLIC BLOOD PRESSURE: 131 MMHG | HEART RATE: 93 BPM | BODY MASS INDEX: 23.39 KG/M2 | DIASTOLIC BLOOD PRESSURE: 83 MMHG | WEIGHT: 137 LBS | HEIGHT: 64 IN | TEMPERATURE: 96.2 F | RESPIRATION RATE: 16 BRPM

## 2025-01-06 DIAGNOSIS — F03.90 UNSPECIFIED DEMENTIA W/OUT BEHAVIORAL DISTURBANCE: ICD-10-CM

## 2025-01-06 DIAGNOSIS — I10 ESSENTIAL (PRIMARY) HYPERTENSION: ICD-10-CM

## 2025-01-06 DIAGNOSIS — R26.81 UNSTEADINESS ON FEET: ICD-10-CM

## 2025-01-06 DIAGNOSIS — M81.0 AGE-RELATED OSTEOPOROSIS W/OUT CURRENT PATHOLOGICAL FRACTURE: ICD-10-CM

## 2025-01-06 DIAGNOSIS — F51.01 PRIMARY INSOMNIA: ICD-10-CM

## 2025-01-06 DIAGNOSIS — G40.909 EPILEPSY, UNSPECIFIED, NOT INTRACTABLE, W/OUT STATUS EPILEPTICUS: ICD-10-CM

## 2025-01-06 PROCEDURE — 99214 OFFICE O/P EST MOD 30 MIN: CPT

## 2025-01-07 LAB
ALBUMIN SERPL ELPH-MCNC: 4.2 G/DL
ALP BLD-CCNC: 48 U/L
ALT SERPL-CCNC: 19 U/L
ANION GAP SERPL CALC-SCNC: 11 MMOL/L
AST SERPL-CCNC: 29 U/L
BILIRUB SERPL-MCNC: 0.4 MG/DL
BUN SERPL-MCNC: 45 MG/DL
CALCIUM SERPL-MCNC: 10 MG/DL
CHLORIDE SERPL-SCNC: 99 MMOL/L
CO2 SERPL-SCNC: 29 MMOL/L
CREAT SERPL-MCNC: 0.75 MG/DL
EGFR: 82 ML/MIN/1.73M2
GLUCOSE SERPL-MCNC: 68 MG/DL
HCT VFR BLD CALC: 43.4 %
HGB BLD-MCNC: 14.3 G/DL
MCHC RBC-ENTMCNC: 31.2 PG
MCHC RBC-ENTMCNC: 32.9 G/DL
MCV RBC AUTO: 94.6 FL
PLATELET # BLD AUTO: 158 K/UL
POTASSIUM SERPL-SCNC: 4.7 MMOL/L
PROT SERPL-MCNC: 7.4 G/DL
RBC # BLD: 4.59 M/UL
RBC # FLD: 13.6 %
SODIUM SERPL-SCNC: 139 MMOL/L
WBC # FLD AUTO: 7.05 K/UL

## 2025-01-08 LAB
CARBAMAZEPINE SERPL-MCNC: 9.8 UG/ML
VALPROATE SERPL-MCNC: 59 UG/ML

## 2025-01-14 DIAGNOSIS — R29.6 REPEATED FALLS: ICD-10-CM

## 2025-01-24 ENCOUNTER — TRANSCRIPTION ENCOUNTER (OUTPATIENT)
Age: 80
End: 2025-01-24

## 2025-01-24 ENCOUNTER — NON-APPOINTMENT (OUTPATIENT)
Age: 80
End: 2025-01-24

## 2025-01-27 ENCOUNTER — APPOINTMENT (OUTPATIENT)
Dept: GERIATRICS | Facility: CLINIC | Age: 80
End: 2025-01-27
Payer: MEDICARE

## 2025-01-27 VITALS
DIASTOLIC BLOOD PRESSURE: 81 MMHG | TEMPERATURE: 97.3 F | WEIGHT: 141 LBS | RESPIRATION RATE: 15 BRPM | BODY MASS INDEX: 24.2 KG/M2 | HEART RATE: 84 BPM | OXYGEN SATURATION: 97 % | SYSTOLIC BLOOD PRESSURE: 118 MMHG

## 2025-01-27 DIAGNOSIS — F03.90 UNSPECIFIED DEMENTIA W/OUT BEHAVIORAL DISTURBANCE: ICD-10-CM

## 2025-01-27 DIAGNOSIS — R13.10 DYSPHAGIA, UNSPECIFIED: ICD-10-CM

## 2025-01-27 DIAGNOSIS — R29.6 REPEATED FALLS: ICD-10-CM

## 2025-01-27 DIAGNOSIS — R26.81 UNSTEADINESS ON FEET: ICD-10-CM

## 2025-01-27 PROCEDURE — 99214 OFFICE O/P EST MOD 30 MIN: CPT

## 2025-01-27 PROCEDURE — G2211 COMPLEX E/M VISIT ADD ON: CPT

## 2025-02-07 ENCOUNTER — NON-APPOINTMENT (OUTPATIENT)
Age: 80
End: 2025-02-07

## 2025-02-07 DIAGNOSIS — G40.909 EPILEPSY, UNSPECIFIED, NOT INTRACTABLE, W/OUT STATUS EPILEPTICUS: ICD-10-CM

## 2025-03-03 ENCOUNTER — TRANSCRIPTION ENCOUNTER (OUTPATIENT)
Age: 80
End: 2025-03-03

## 2025-03-12 ENCOUNTER — TRANSCRIPTION ENCOUNTER (OUTPATIENT)
Age: 80
End: 2025-03-12

## 2025-03-31 ENCOUNTER — NON-APPOINTMENT (OUTPATIENT)
Age: 80
End: 2025-03-31

## 2025-03-31 ENCOUNTER — RX RENEWAL (OUTPATIENT)
Age: 80
End: 2025-03-31

## 2025-03-31 ENCOUNTER — APPOINTMENT (OUTPATIENT)
Dept: GERIATRICS | Facility: CLINIC | Age: 80
End: 2025-03-31
Payer: MEDICARE

## 2025-03-31 VITALS
HEART RATE: 77 BPM | TEMPERATURE: 97.3 F | HEIGHT: 64 IN | SYSTOLIC BLOOD PRESSURE: 150 MMHG | RESPIRATION RATE: 14 BRPM | OXYGEN SATURATION: 98 % | DIASTOLIC BLOOD PRESSURE: 67 MMHG | WEIGHT: 140.5 LBS | BODY MASS INDEX: 23.99 KG/M2

## 2025-03-31 DIAGNOSIS — H25.9 UNSPECIFIED AGE-RELATED CATARACT: ICD-10-CM

## 2025-03-31 DIAGNOSIS — F03.90 UNSPECIFIED DEMENTIA W/OUT BEHAVIORAL DISTURBANCE: ICD-10-CM

## 2025-03-31 DIAGNOSIS — R13.10 DYSPHAGIA, UNSPECIFIED: ICD-10-CM

## 2025-03-31 DIAGNOSIS — Z01.818 ENCOUNTER FOR OTHER PREPROCEDURAL EXAMINATION: ICD-10-CM

## 2025-03-31 DIAGNOSIS — M81.0 AGE-RELATED OSTEOPOROSIS W/OUT CURRENT PATHOLOGICAL FRACTURE: ICD-10-CM

## 2025-03-31 DIAGNOSIS — I47.19 OTHER SUPRAVENTRICULAR TACHYCARDIA: ICD-10-CM

## 2025-03-31 DIAGNOSIS — R29.6 REPEATED FALLS: ICD-10-CM

## 2025-03-31 DIAGNOSIS — G40.909 EPILEPSY, UNSPECIFIED, NOT INTRACTABLE, W/OUT STATUS EPILEPTICUS: ICD-10-CM

## 2025-03-31 PROCEDURE — 99214 OFFICE O/P EST MOD 30 MIN: CPT

## 2025-03-31 PROCEDURE — 93000 ELECTROCARDIOGRAM COMPLETE: CPT

## 2025-03-31 PROCEDURE — G2211 COMPLEX E/M VISIT ADD ON: CPT

## 2025-04-01 ENCOUNTER — NON-APPOINTMENT (OUTPATIENT)
Age: 80
End: 2025-04-01

## 2025-04-28 ENCOUNTER — INPATIENT (INPATIENT)
Facility: HOSPITAL | Age: 80
LOS: 5 days | Discharge: HOME | DRG: 92 | End: 2025-05-04
Attending: STUDENT IN AN ORGANIZED HEALTH CARE EDUCATION/TRAINING PROGRAM | Admitting: PSYCHIATRY & NEUROLOGY
Payer: MEDICARE

## 2025-04-28 VITALS
OXYGEN SATURATION: 97 % | WEIGHT: 143.08 LBS | SYSTOLIC BLOOD PRESSURE: 143 MMHG | HEART RATE: 77 BPM | HEIGHT: 65 IN | RESPIRATION RATE: 18 BRPM | TEMPERATURE: 98 F | DIASTOLIC BLOOD PRESSURE: 87 MMHG

## 2025-04-28 DIAGNOSIS — Z98.890 OTHER SPECIFIED POSTPROCEDURAL STATES: Chronic | ICD-10-CM

## 2025-04-28 LAB
ADD ON TEST-SPECIMEN IN LAB: SIGNIFICANT CHANGE UP
ALBUMIN SERPL ELPH-MCNC: 3.8 G/DL — SIGNIFICANT CHANGE UP (ref 3.3–5)
ALP SERPL-CCNC: 38 U/L — LOW (ref 40–120)
ALT FLD-CCNC: 11 U/L — SIGNIFICANT CHANGE UP (ref 10–45)
ANION GAP SERPL CALC-SCNC: 9 MMOL/L — SIGNIFICANT CHANGE UP (ref 5–17)
APPEARANCE UR: CLEAR — SIGNIFICANT CHANGE UP
AST SERPL-CCNC: 17 U/L — SIGNIFICANT CHANGE UP (ref 10–40)
BASOPHILS # BLD AUTO: 0.04 K/UL — SIGNIFICANT CHANGE UP (ref 0–0.2)
BASOPHILS NFR BLD AUTO: 0.7 % — SIGNIFICANT CHANGE UP (ref 0–2)
BILIRUB SERPL-MCNC: 0.2 MG/DL — SIGNIFICANT CHANGE UP (ref 0.2–1.2)
BILIRUB UR-MCNC: NEGATIVE — SIGNIFICANT CHANGE UP
BUN SERPL-MCNC: 30 MG/DL — HIGH (ref 7–23)
CALCIUM SERPL-MCNC: 9.4 MG/DL — SIGNIFICANT CHANGE UP (ref 8.4–10.5)
CARBAMAZEPINE SERPL-MCNC: 8.7 UG/ML — SIGNIFICANT CHANGE UP (ref 4–12)
CHLORIDE SERPL-SCNC: 100 MMOL/L — SIGNIFICANT CHANGE UP (ref 96–108)
CO2 SERPL-SCNC: 29 MMOL/L — SIGNIFICANT CHANGE UP (ref 22–31)
COLOR SPEC: SIGNIFICANT CHANGE UP
CREAT SERPL-MCNC: 0.61 MG/DL — SIGNIFICANT CHANGE UP (ref 0.5–1.3)
DIFF PNL FLD: NEGATIVE — SIGNIFICANT CHANGE UP
EGFR: 91 ML/MIN/1.73M2 — SIGNIFICANT CHANGE UP
EGFR: 91 ML/MIN/1.73M2 — SIGNIFICANT CHANGE UP
EOSINOPHIL # BLD AUTO: 0.06 K/UL — SIGNIFICANT CHANGE UP (ref 0–0.5)
EOSINOPHIL NFR BLD AUTO: 1.1 % — SIGNIFICANT CHANGE UP (ref 0–6)
GAS PNL BLDV: SIGNIFICANT CHANGE UP
GLUCOSE SERPL-MCNC: 106 MG/DL — HIGH (ref 70–99)
GLUCOSE UR QL: NEGATIVE MG/DL — SIGNIFICANT CHANGE UP
HCT VFR BLD CALC: 39.2 % — SIGNIFICANT CHANGE UP (ref 34.5–45)
HGB BLD-MCNC: 13.1 G/DL — SIGNIFICANT CHANGE UP (ref 11.5–15.5)
IMM GRANULOCYTES NFR BLD AUTO: 0.4 % — SIGNIFICANT CHANGE UP (ref 0–0.9)
KETONES UR-MCNC: ABNORMAL MG/DL
LEUKOCYTE ESTERASE UR-ACNC: NEGATIVE — SIGNIFICANT CHANGE UP
LIDOCAIN IGE QN: 19 U/L — SIGNIFICANT CHANGE UP (ref 7–60)
LYMPHOCYTES # BLD AUTO: 1.33 K/UL — SIGNIFICANT CHANGE UP (ref 1–3.3)
LYMPHOCYTES # BLD AUTO: 24.3 % — SIGNIFICANT CHANGE UP (ref 13–44)
MAGNESIUM SERPL-MCNC: 2.2 MG/DL — SIGNIFICANT CHANGE UP (ref 1.6–2.6)
MCHC RBC-ENTMCNC: 31.5 PG — SIGNIFICANT CHANGE UP (ref 27–34)
MCHC RBC-ENTMCNC: 33.4 G/DL — SIGNIFICANT CHANGE UP (ref 32–36)
MCV RBC AUTO: 94.2 FL — SIGNIFICANT CHANGE UP (ref 80–100)
MONOCYTES # BLD AUTO: 0.57 K/UL — SIGNIFICANT CHANGE UP (ref 0–0.9)
MONOCYTES NFR BLD AUTO: 10.4 % — SIGNIFICANT CHANGE UP (ref 2–14)
NEUTROPHILS # BLD AUTO: 3.46 K/UL — SIGNIFICANT CHANGE UP (ref 1.8–7.4)
NEUTROPHILS NFR BLD AUTO: 63.1 % — SIGNIFICANT CHANGE UP (ref 43–77)
NITRITE UR-MCNC: NEGATIVE — SIGNIFICANT CHANGE UP
NRBC BLD AUTO-RTO: 0 /100 WBCS — SIGNIFICANT CHANGE UP (ref 0–0)
PH UR: 6 — SIGNIFICANT CHANGE UP (ref 5–8)
PLATELET # BLD AUTO: 157 K/UL — SIGNIFICANT CHANGE UP (ref 150–400)
POTASSIUM SERPL-MCNC: 4.6 MMOL/L — SIGNIFICANT CHANGE UP (ref 3.5–5.3)
POTASSIUM SERPL-SCNC: 4.6 MMOL/L — SIGNIFICANT CHANGE UP (ref 3.5–5.3)
PROT SERPL-MCNC: 6.9 G/DL — SIGNIFICANT CHANGE UP (ref 6–8.3)
PROT UR-MCNC: SIGNIFICANT CHANGE UP MG/DL
RBC # BLD: 4.16 M/UL — SIGNIFICANT CHANGE UP (ref 3.8–5.2)
RBC # FLD: 13.2 % — SIGNIFICANT CHANGE UP (ref 10.3–14.5)
SODIUM SERPL-SCNC: 138 MMOL/L — SIGNIFICANT CHANGE UP (ref 135–145)
SP GR SPEC: >1.03 — HIGH (ref 1–1.03)
UROBILINOGEN FLD QL: 0.2 MG/DL — SIGNIFICANT CHANGE UP (ref 0.2–1)
WBC # BLD: 5.48 K/UL — SIGNIFICANT CHANGE UP (ref 3.8–10.5)
WBC # FLD AUTO: 5.48 K/UL — SIGNIFICANT CHANGE UP (ref 3.8–10.5)

## 2025-04-28 PROCEDURE — 99285 EMERGENCY DEPT VISIT HI MDM: CPT

## 2025-04-28 PROCEDURE — 93010 ELECTROCARDIOGRAM REPORT: CPT

## 2025-04-28 PROCEDURE — 70450 CT HEAD/BRAIN W/O DYE: CPT | Mod: 26

## 2025-04-28 PROCEDURE — 74177 CT ABD & PELVIS W/CONTRAST: CPT | Mod: 26

## 2025-04-28 RX ORDER — LORAZEPAM 4 MG/ML
1 VIAL (ML) INJECTION ONCE
Refills: 0 | Status: DISCONTINUED | OUTPATIENT
Start: 2025-04-28 | End: 2025-04-28

## 2025-04-28 RX ORDER — OLANZAPINE 10 MG/1
2.5 TABLET ORAL ONCE
Refills: 0 | Status: COMPLETED | OUTPATIENT
Start: 2025-04-28 | End: 2025-04-28

## 2025-04-28 RX ORDER — LORAZEPAM 4 MG/ML
2 VIAL (ML) INJECTION ONCE
Refills: 0 | Status: DISCONTINUED | OUTPATIENT
Start: 2025-04-28 | End: 2025-05-04

## 2025-04-28 RX ORDER — LACOSAMIDE 150 MG/1
200 TABLET, FILM COATED ORAL ONCE
Refills: 0 | Status: DISCONTINUED | OUTPATIENT
Start: 2025-04-28 | End: 2025-04-28

## 2025-04-28 RX ORDER — ONDANSETRON HCL/PF 4 MG/2 ML
4 VIAL (ML) INJECTION ONCE
Refills: 0 | Status: COMPLETED | OUTPATIENT
Start: 2025-04-28 | End: 2025-04-28

## 2025-04-28 RX ADMIN — Medication 1000 MILLILITER(S): at 18:47

## 2025-04-28 RX ADMIN — OLANZAPINE 2.5 MILLIGRAM(S): 10 TABLET ORAL at 21:06

## 2025-04-28 RX ADMIN — Medication 4 MILLIGRAM(S): at 17:29

## 2025-04-28 RX ADMIN — Medication 500 MILLILITER(S): at 20:00

## 2025-04-28 RX ADMIN — LACOSAMIDE 200 MILLIGRAM(S): 150 TABLET, FILM COATED ORAL at 20:56

## 2025-04-28 RX ADMIN — Medication 20 MILLIGRAM(S): at 17:29

## 2025-04-28 RX ADMIN — OLANZAPINE 2.5 MILLIGRAM(S): 10 TABLET ORAL at 23:39

## 2025-04-28 RX ADMIN — Medication 100 MILLILITER(S): at 20:50

## 2025-04-28 NOTE — ED PROVIDER NOTE - PROGRESS NOTE DETAILS
Epilepsy consulted and will see the pt D/w epilepsy fellow and neuro resident, admit to epilepsy, Dr Owens Epilepsy recs:  Recommendations:  - Recommend medicine admission for work up and treatment of intractable nausea/vomiting.  - Upon arrival to inpatient unit, please order vEEG monitoring  - If unable to tolerate PO meds, consider NGT (to give home dose carbamazepine) or IV alternative below:  - Please give IV Vimpat 200mg STAT and maintenance dose: IV Vimpat 100mg BID ( this is in lieu of carbamazepine which is only available in oral form)  - c/w home dose VPA 1000mg BID and give IV formulation  - Obtain CTH  - Obtain Depakote trough level in AM prior to AM dose ( The emergency room -drawn depakote level is inaccurate and represents peak- dose effect as she took her medication 4 hrs prior to blood draw ED w/u neg for acute pathology. D/w epilepsy fellow and neuro resident, admit to epilepsy, Dr Owens

## 2025-04-28 NOTE — ED PROVIDER NOTE - PHYSICAL EXAMINATION
Constitutional: Well appearing, awake, alert, oriented to person, place, and in no apparent distress.  ENMT: Airway patent. Mildly dry MM  Eyes: Clear bilaterally, PERRL, EOMI  Cardiac: Normal rate, regular rhythm.  Heart sounds S1, S2.  No murmurs, rubs or gallops.  Respiratory: Breaths sounds equal and clear b/l. No increased WOB, tachypnea, hypoxia, or accessory mm use. Pt speaks in full sentences.   Gastrointestinal: Abd soft, NT, ND, NABS. No guarding, rebound, or rigidity. No pulsatile abdominal masses. No organomegaly appreciated.   Musculoskeletal: Range of motion is not limited  Neuro: Alert & Oriented x 2. CN II-XII intact. No facial droop. Clear speech. DUNAWAY w/ 5/5 strength x 4 ext. Normal sensation. No pronator drift. No dysdidokinesia nor dysmetria.   Skin: Skin normal color for race, warm, dry and intact. No evidence of rash.  Psych: Alert and oriented to person, place, normal mood and affect. no apparent risk to self or others.

## 2025-04-28 NOTE — ED PROVIDER NOTE - NS ED ROS FT
Constitutional: No fever or chills.   Cardiac: No chest pain, SOB   Respiratory: No cough or respiratory distress.   GI: No diarrhea or abdominal pain.  : No dysuria  Neuro: No headache or focal weakness. No LOC..  Except as documented in the HPI, all other systems are negative.

## 2025-04-28 NOTE — CONSULT NOTE ADULT - SUBJECTIVE AND OBJECTIVE BOX
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 Last tonic-clonic Thurs abated w/ IN Midaz 5 mg. Intermittently staring off. N/v since yesterday, no apparent pain. No known f/c, URI, GI, or  sx. Dark, concentrated urine, appears dehydrated       Review of Systems:  CONSTITUTIONAL:  No weight loss, fever, chills, weakness or fatigue.  HEENT:  Eyes:  No visual loss, blurred vision, double vision or yellow sclerae. Ears, Nose, Throat:  No hearing loss, sneezing, congestion, runny nose or sore throat.  SKIN:  No rash or itching.  CARDIOVASCULAR:  No chest pain, chest pressure or chest discomfort. No palpitations or edema.  RESPIRATORY:  No shortness of breath, cough or sputum.  NEUROLOGICAL:  see HPI         PAST MEDICAL & SURGICAL HISTORY:  Breast cancer, stage 1  History of brain tumor  Epilepsy  HTN (hypertension)  S/P breast lumpectomy  H/O excision of tumor of brain meninges         FAMILY HISTORY:  No pertinent family history in first degree relatives         Allergies  Zonegran (Other)  antihistamines (Unknown)  Keppra (Unknown)  Dilantin (Hives)  Bactrim (Unknown)  phenobarbital (Unknown)  Neurontin (Unknown)  Topamax (Sedation/Somnol)  Trileptal (Nausea)  MSG (Unknown)       MEDICATIONS  (STANDING):  sodium chloride 0.9%. 1000 milliLiter(s) (100 mL/Hr) IV Continuous <Continuous>    MEDICATIONS  (PRN):  LORazepam   Injectable 1 milliGRAM(s) IV Push once PRN seizure       T(C): 36.4 (04-28-25 @ 17:16), Max: 36.4 (04-28-25 @ 15:48)  HR: 75 (04-28-25 @ 17:16) (75 - 77)  BP: 128/80 (04-28-25 @ 17:16) (128/80 - 143/87)  RR: 18 (04-28-25 @ 17:16) (18 - 18)  SpO2: 98% (04-28-25 @ 17:16) (97% - 98%)  Wt(kg): --      PHYSICAL EXAM:  General: in NAD, lying comfortably in bed    Neurological:  - Mental Status: AAOx2; speech is fluent with intact naming, repetition, and  comprehension  - Cranial Nerves II -XII:  II: PERRLA; visual fields are full to confrontation  III, IV, VI: EOMI, no nystagmus appreciated  V: facial sensation intact to light touch V1-V3 b/l  VII: no ptosis, no facial droop, symmetric eyebrow raise and closure  VIII: hearing intact to finger rub b/l  IX, X: uvula is midline, soft palate rises symmetrically  XI: head turning and shoulder shrug intact b/l  XII: tongue protrusion midline  - Motor: strength is 5/5 b/l LLE & RLE, 5/5 b/l LUE & RUE. normal muscle bulk  and tone throughout, no pronator drift  - Sensation: intact  - Gait: deferred        Labs  CBC Full  -  ( 28 Apr 2025 17:37 )  WBC Count : 5.48 K/uL  RBC Count : 4.16 M/uL  Hemoglobin : 13.1 g/dL  Hematocrit : 39.2 %  Platelet Count - Automated : 157 K/uL  Mean Cell Volume : 94.2 fl  Mean Cell Hemoglobin : 31.5 pg  Mean Cell Hemoglobin Concentration : 33.4 g/dL  Auto Neutrophil # : x  Auto Lymphocyte # : x  Auto Monocyte # : x  Auto Eosinophil # : x  Auto Basophil # : x  Auto Neutrophil % : x  Auto Lymphocyte % : x  Auto Monocyte % : x  Auto Eosinophil % : x  Auto Basophil % : x    04-28    138  |  100  |  30[H]  ----------------------------<  106[H]  4.6   |  29  |  0.61    Ca    9.4      28 Apr 2025 17:37  Mg     2.2     04-28    TPro  6.9  /  Alb  3.8  /  TBili  0.2  /  DBili  x   /  AST  17  /  ALT  11  /  AlkPhos  38[L]  04-28    LIVER FUNCTIONS - ( 28 Apr 2025 17:37 )  Alb: 3.8 g/dL / Pro: 6.9 g/dL / ALK PHOS: 38 U/L / ALT: 11 U/L / AST: 17 U/L / GGT: x           Valproic Acid Level, Serum: 122.2 ug/mL *H* [50.0 - 100.0] (04-28 @ 17:37)

## 2025-04-28 NOTE — ED PROVIDER NOTE - CLINICAL SUMMARY MEDICAL DECISION MAKING FREE TEXT BOX
Pt brought in by daughter for concern for breakthrough seizures and n/v. Pt at baseline mental status per daughter at bedside (A&O x 2). Pt denies complaints and contributes little pertinent to hx. No HA. Non focal neuro exam. No n/v in the ED. Benign, non tender abdomen. Afebrile, no infectious sx. DDx includes but not limited to breakthrough seizure, toxic metabolic encephalopathy, infection , electrolyte abnormality, less likely ICH / mass lesion, enteritis, medication adverse effect, toxic medication level, less likely intra-abdominal surgical pathology. Check labs, UA, CTH, CT a/p, epilepsy consult. Dispo pending w/u and clinical status

## 2025-04-28 NOTE — ED ADULT NURSE REASSESSMENT NOTE - NS ED NURSE REASSESS COMMENT FT1
Received report from LA Williamson. Received patient in stretcher. AOX1. Vital signs as noted in flowsheet. Pt agitated, restless, trying to get out of bed despite multiple attempts to reorient. MD Tirado made aware. Enhanced supervision initiated for high risk for fall. Patient and daughter oriented to ED area. Plan of care discussed with daughter and verbalized understanding. All needs attended. Pt on cardiac monitor. Fall risk precautions maintained. Purposeful proactive hourly rounding in progress.

## 2025-04-28 NOTE — CONSULT NOTE ADULT - ASSESSMENT
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 Epilepsy consulted for n/v, unable to tolerate PO AEDs. Patient has a hx of Seizures, frequency every 4-6 weeks, last seizure Thursday but noted to have staring spells.     Recommendations:  - Once admitted to medicine, please order vEEG monitoring  - If unable to tolerate PO meds, consider NGT (to give PO meds) or IV alternative below:  - Please give IV Vimpat 200mg STAT and maintenance dose: IV Vimpat 100mg BID  - IV VPA 750mg BID  - Obtain CTH  - Obtain Depakote level and LFT's      Epilepsy will continue to follow.     Case discussed with Epilepsy Nikkie Gonsalez and Dr. 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 Epilepsy consulted for n/v, unable to tolerate PO AEDs. Patient has a hx of Seizures, frequency every 4-6 weeks, last seizure Thursday but noted to have staring spells.     Recommendations:  - Recommend medicine admission for work up and treatment of intractable nausea/vomiting.  - Upon arrival to inpatient unit, please order vEEG monitoring  - If unable to tolerate PO meds, consider NGT (to give home dose carbamazepine) or IV alternative below:  - Please give IV Vimpat 200mg STAT and maintenance dose: IV Vimpat 100mg BID ( this is in lieu of carbamazepine which is only available in oral form)  - c/w home dose VPA 1000mg BID and give IV formulation  - Obtain CTH  - Obtain Depakote trough level in AM prior to AM dose ( The emergency room -drawn depakote level is inaccurate and represents peak- dose effect as she took her medication 4 hrs prior to blood draw).      Epilepsy will continue to follow while patient admitted under medicine service for w/u of N/V to exclude etiologies such as infection/inflammation or bowel obstruction etc...    Case discussed with Epilepsy Fellow Sunshine Mckeon and Dr. Owens. Patient to be examined by epilepsy consult attending in AM

## 2025-04-28 NOTE — PATIENT PROFILE ADULT - FALL HARM RISK - HARM RISK INTERVENTIONS
Assistance with ambulation/Assistance OOB with selected safe patient handling equipment/Communicate Risk of Fall with Harm to all staff/Discuss with provider need for PT consult/Monitor gait and stability/Reinforce activity limits and safety measures with patient and family/Tailored Fall Risk Interventions/Visual Cue: Yellow wristband and red socks/Bed in lowest position, wheels locked, appropriate side rails in place/Call bell, personal items and telephone in reach/Instruct patient to call for assistance before getting out of bed or chair/Non-slip footwear when patient is out of bed/[place] to call system/Physically safe environment - no spills, clutter or unnecessary equipment/Purposeful Proactive Rounding/Room/bathroom lighting operational, light cord in reach

## 2025-04-28 NOTE — ED PROVIDER NOTE - OBJECTIVE STATEMENT
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 No known f/c, URI, GI, or  sx. 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 On meds, pt has seizures every 4-6 weeks.  Last tonic-clonic Thursday, 4/24, abated w/ IN Midazolam 5 mg   Intermittently staring off, uncertain if seizure activity.  N/v since yesterday, no apparent pain. Initially spit up food, but today w/ n/v. No known f/c, URI, GI, or  sx. Dark, concentrated urine, appears dehydrated. Wasn't able to tolerate PO seizure meds today, except Depakote 1 g PO at 1pm.   Pt denies complaints in the ED. Denies abd pain, ongoing nausea, HA, dizziness, CP, SOB.  Pt's daughter has been in touch w/ neurologist Dr Najjar's office and staff, and was advised to come to the ED and get admitted for EEG.

## 2025-04-28 NOTE — CONSULT NOTE ADULT - ATTENDING COMMENTS
Case discussed with Dr. Najjar's nurse practicioner (Gretchen Carrera) who referred patient to ER for work up of N/V, decreased ability to tolerate PO and EEG evaluation. Case also discussed with Epilepsy fellow Dr. Mckeon. Patient will be examined by epilepsy attending in AM.

## 2025-04-28 NOTE — ED ADULT TRIAGE NOTE - CHIEF COMPLAINT QUOTE
BIBA from home c/o vomiting x 1 day. Hx of Alzheimer's, epilepsy. becomes very anxious and begins vomiting per daughter, had trouble keeping down PO meds today due to vomiting. Was able to take 1000mg Depakote at 1300. last seizure Thursday per daughter.

## 2025-04-29 ENCOUNTER — APPOINTMENT (OUTPATIENT)
Dept: NEUROLOGY | Facility: CLINIC | Age: 80
End: 2025-04-29

## 2025-04-29 ENCOUNTER — NON-APPOINTMENT (OUTPATIENT)
Age: 80
End: 2025-04-29

## 2025-04-29 LAB
ALBUMIN SERPL ELPH-MCNC: 3.7 G/DL — SIGNIFICANT CHANGE UP (ref 3.3–5)
ALP SERPL-CCNC: 35 U/L — LOW (ref 40–120)
ALT FLD-CCNC: 11 U/L — SIGNIFICANT CHANGE UP (ref 10–45)
ANION GAP SERPL CALC-SCNC: 11 MMOL/L — SIGNIFICANT CHANGE UP (ref 5–17)
AST SERPL-CCNC: 20 U/L — SIGNIFICANT CHANGE UP (ref 10–40)
BILIRUB SERPL-MCNC: 0.2 MG/DL — SIGNIFICANT CHANGE UP (ref 0.2–1.2)
BUN SERPL-MCNC: 22 MG/DL — SIGNIFICANT CHANGE UP (ref 7–23)
CALCIUM SERPL-MCNC: 9.1 MG/DL — SIGNIFICANT CHANGE UP (ref 8.4–10.5)
CHLORIDE SERPL-SCNC: 104 MMOL/L — SIGNIFICANT CHANGE UP (ref 96–108)
CO2 SERPL-SCNC: 26 MMOL/L — SIGNIFICANT CHANGE UP (ref 22–31)
CREAT SERPL-MCNC: 0.64 MG/DL — SIGNIFICANT CHANGE UP (ref 0.5–1.3)
EGFR: 90 ML/MIN/1.73M2 — SIGNIFICANT CHANGE UP
EGFR: 90 ML/MIN/1.73M2 — SIGNIFICANT CHANGE UP
GLUCOSE SERPL-MCNC: 81 MG/DL — SIGNIFICANT CHANGE UP (ref 70–99)
HCT VFR BLD CALC: 41.3 % — SIGNIFICANT CHANGE UP (ref 34.5–45)
HGB BLD-MCNC: 13.6 G/DL — SIGNIFICANT CHANGE UP (ref 11.5–15.5)
MAGNESIUM SERPL-MCNC: 2.3 MG/DL — SIGNIFICANT CHANGE UP (ref 1.6–2.6)
MCHC RBC-ENTMCNC: 32.4 PG — SIGNIFICANT CHANGE UP (ref 27–34)
MCHC RBC-ENTMCNC: 32.9 G/DL — SIGNIFICANT CHANGE UP (ref 32–36)
MCV RBC AUTO: 98.3 FL — SIGNIFICANT CHANGE UP (ref 80–100)
NRBC BLD AUTO-RTO: 0 /100 WBCS — SIGNIFICANT CHANGE UP (ref 0–0)
PHOSPHATE SERPL-MCNC: 3.2 MG/DL — SIGNIFICANT CHANGE UP (ref 2.5–4.5)
PLATELET # BLD AUTO: 162 K/UL — SIGNIFICANT CHANGE UP (ref 150–400)
POTASSIUM SERPL-MCNC: 4.4 MMOL/L — SIGNIFICANT CHANGE UP (ref 3.5–5.3)
POTASSIUM SERPL-SCNC: 4.4 MMOL/L — SIGNIFICANT CHANGE UP (ref 3.5–5.3)
PROT SERPL-MCNC: 6.8 G/DL — SIGNIFICANT CHANGE UP (ref 6–8.3)
RBC # BLD: 4.2 M/UL — SIGNIFICANT CHANGE UP (ref 3.8–5.2)
RBC # FLD: 13.2 % — SIGNIFICANT CHANGE UP (ref 10.3–14.5)
SODIUM SERPL-SCNC: 141 MMOL/L — SIGNIFICANT CHANGE UP (ref 135–145)
VALPROATE SERPL-MCNC: 72.7 UG/ML — SIGNIFICANT CHANGE UP (ref 50–100)
WBC # BLD: 10.07 K/UL — SIGNIFICANT CHANGE UP (ref 3.8–10.5)
WBC # FLD AUTO: 10.07 K/UL — SIGNIFICANT CHANGE UP (ref 3.8–10.5)

## 2025-04-29 PROCEDURE — 99222 1ST HOSP IP/OBS MODERATE 55: CPT

## 2025-04-29 PROCEDURE — 99223 1ST HOSP IP/OBS HIGH 75: CPT

## 2025-04-29 PROCEDURE — 93010 ELECTROCARDIOGRAM REPORT: CPT

## 2025-04-29 RX ORDER — CARBAMAZEPINE 200 MG/1
450 TABLET ORAL
Refills: 0 | Status: DISCONTINUED | OUTPATIENT
Start: 2025-04-29 | End: 2025-04-29

## 2025-04-29 RX ORDER — BISACODYL 5 MG
10 TABLET, DELAYED RELEASE (ENTERIC COATED) ORAL ONCE
Refills: 0 | Status: COMPLETED | OUTPATIENT
Start: 2025-04-29 | End: 2025-04-29

## 2025-04-29 RX ORDER — ONDANSETRON HCL/PF 4 MG/2 ML
4 VIAL (ML) INJECTION EVERY 8 HOURS
Refills: 0 | Status: DISCONTINUED | OUTPATIENT
Start: 2025-04-29 | End: 2025-05-04

## 2025-04-29 RX ORDER — MELATONIN 5 MG
5 TABLET ORAL AT BEDTIME
Refills: 0 | Status: DISCONTINUED | OUTPATIENT
Start: 2025-04-29 | End: 2025-05-04

## 2025-04-29 RX ORDER — MELATONIN 5 MG
2 TABLET ORAL AT BEDTIME
Refills: 0 | Status: DISCONTINUED | OUTPATIENT
Start: 2025-04-29 | End: 2025-04-29

## 2025-04-29 RX ORDER — METOPROLOL SUCCINATE 50 MG/1
5 TABLET, EXTENDED RELEASE ORAL EVERY 12 HOURS
Refills: 0 | Status: DISCONTINUED | OUTPATIENT
Start: 2025-04-29 | End: 2025-04-29

## 2025-04-29 RX ORDER — ZOLEDRONIC ACID 0.05 MG/ML
5 INJECTION, SOLUTION INTRAVENOUS
Refills: 0 | DISCHARGE

## 2025-04-29 RX ORDER — OLANZAPINE 10 MG/1
5 TABLET ORAL EVERY 8 HOURS
Refills: 0 | Status: DISCONTINUED | OUTPATIENT
Start: 2025-04-29 | End: 2025-05-01

## 2025-04-29 RX ORDER — LORAZEPAM 4 MG/ML
1 VIAL (ML) INJECTION ONCE
Refills: 0 | Status: DISCONTINUED | OUTPATIENT
Start: 2025-04-29 | End: 2025-04-29

## 2025-04-29 RX ORDER — CARBAMAZEPINE 200 MG/1
450 TABLET ORAL
Refills: 0 | Status: DISCONTINUED | OUTPATIENT
Start: 2025-04-29 | End: 2025-05-02

## 2025-04-29 RX ORDER — BISACODYL 5 MG
5 TABLET, DELAYED RELEASE (ENTERIC COATED) ORAL EVERY 12 HOURS
Refills: 0 | Status: DISCONTINUED | OUTPATIENT
Start: 2025-04-29 | End: 2025-05-04

## 2025-04-29 RX ORDER — MELATONIN 5 MG
1 TABLET ORAL
Refills: 0 | DISCHARGE

## 2025-04-29 RX ORDER — METOPROLOL SUCCINATE 50 MG/1
2.5 TABLET, EXTENDED RELEASE ORAL EVERY 6 HOURS
Refills: 0 | Status: DISCONTINUED | OUTPATIENT
Start: 2025-04-29 | End: 2025-05-01

## 2025-04-29 RX ORDER — MIDAZOLAM IN 0.9 % SOD.CHLORID 1 MG/ML
1 PLASTIC BAG, INJECTION (ML) INTRAVENOUS
Refills: 0 | DISCHARGE

## 2025-04-29 RX ORDER — LEVOCARNITINE 1 G/5ML
2 INJECTION INTRAVENOUS
Refills: 0 | DISCHARGE

## 2025-04-29 RX ORDER — OLANZAPINE 10 MG/1
2.5 TABLET ORAL ONCE
Refills: 0 | Status: COMPLETED | OUTPATIENT
Start: 2025-04-29 | End: 2025-04-29

## 2025-04-29 RX ORDER — ENOXAPARIN SODIUM 100 MG/ML
40 INJECTION SUBCUTANEOUS EVERY 24 HOURS
Refills: 0 | Status: DISCONTINUED | OUTPATIENT
Start: 2025-04-29 | End: 2025-05-01

## 2025-04-29 RX ORDER — LEVOCARNITINE 1 G/5ML
1 INJECTION INTRAVENOUS
Refills: 0 | DISCHARGE

## 2025-04-29 RX ORDER — LACOSAMIDE 150 MG/1
100 TABLET, FILM COATED ORAL EVERY 12 HOURS
Refills: 0 | Status: DISCONTINUED | OUTPATIENT
Start: 2025-04-29 | End: 2025-04-30

## 2025-04-29 RX ORDER — LORAZEPAM 4 MG/ML
1 VIAL (ML) INJECTION
Refills: 0 | DISCHARGE

## 2025-04-29 RX ADMIN — Medication 1 MILLIGRAM(S): at 22:38

## 2025-04-29 RX ADMIN — LACOSAMIDE 120 MILLIGRAM(S): 150 TABLET, FILM COATED ORAL at 21:57

## 2025-04-29 RX ADMIN — CARBAMAZEPINE 450 MILLIGRAM(S): 200 TABLET ORAL at 16:00

## 2025-04-29 RX ADMIN — CARBAMAZEPINE 450 MILLIGRAM(S): 200 TABLET ORAL at 21:57

## 2025-04-29 RX ADMIN — Medication 5 MILLIGRAM(S): at 13:46

## 2025-04-29 RX ADMIN — Medication 5 MILLIGRAM(S): at 21:57

## 2025-04-29 RX ADMIN — OLANZAPINE 2.5 MILLIGRAM(S): 10 TABLET ORAL at 03:40

## 2025-04-29 RX ADMIN — Medication 60 MILLIGRAM(S): at 02:38

## 2025-04-29 RX ADMIN — Medication 60 MILLIGRAM(S): at 12:35

## 2025-04-29 RX ADMIN — ENOXAPARIN SODIUM 40 MILLIGRAM(S): 100 INJECTION SUBCUTANEOUS at 07:51

## 2025-04-29 RX ADMIN — METOPROLOL SUCCINATE 5 MILLIGRAM(S): 50 TABLET, EXTENDED RELEASE ORAL at 17:51

## 2025-04-29 RX ADMIN — Medication 10 MILLIGRAM(S): at 13:45

## 2025-04-29 RX ADMIN — LACOSAMIDE 120 MILLIGRAM(S): 150 TABLET, FILM COATED ORAL at 09:24

## 2025-04-29 NOTE — BH CONSULTATION LIAISON ASSESSMENT NOTE - CURRENT MEDICATION
MEDICATIONS  (STANDING):  carBAMazepine Chewable 450 milliGRAM(s) Chew <User Schedule>  enoxaparin Injectable 40 milliGRAM(s) SubCutaneous every 24 hours  lacosamide IVPB 100 milliGRAM(s) IV Intermittent every 12 hours  metoprolol tartrate Injectable 5 milliGRAM(s) IV Push every 12 hours  sodium chloride 0.9%. 1000 milliLiter(s) (100 mL/Hr) IV Continuous <Continuous>  valproate sodium   IVPB 1000 milliGRAM(s) IV Intermittent every 12 hours    MEDICATIONS  (PRN):  bisacodyl 5 milliGRAM(s) Oral every 12 hours PRN Constipation  LORazepam   Injectable 2 milliGRAM(s) IV Push once PRN Seizure Activity  ondansetron Injectable 4 milliGRAM(s) IV Push every 8 hours PRN Nausea and/or Vomiting

## 2025-04-29 NOTE — PROVIDER CONTACT NOTE (OTHER) - BACKGROUND
B: Patient arrived with AMS/ Irritability
B: Patient arrived with AMS, received IM Zyprexa in the emergency department with little  effects.

## 2025-04-29 NOTE — OCCUPATIONAL THERAPY INITIAL EVALUATION ADULT - PERTINENT HX OF CURRENT PROBLEM, REHAB EVAL
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 Collateral obtained from daughter and family friend at bedside. They reported that patient had a generalized toniconic seizure 3-minute duration on 4/24 aborted with midazolam intranasal. However, since then she has had episodes of staring near daily. It is unclear how many times a day it occurs. However, they seem to be occurring more frequently like once a week versus a month ago where it would occur once a month.  On 4/28/25 patient was nauseous and vomiting, denied any sick contacts. Patient is similar to current baseline of waxing and waning mental status but with more staring episodes. Follows Dr Najjar outpatient and was advised to present to ED if patient does not return to baseline after seizure. Was supposed to follow up with Dr Najjar outpatient on 4/29. Daughter reported patient has had dramatically worsened cognition over the past one year because this time last year the patient was able to do chores and walk ambulate independently but over this past year has lost ability to do chores and now walks with walker. Normally can feed herself but on 4/28 needed to be fed by family

## 2025-04-29 NOTE — EEG REPORT - NS EEG TEXT BOX
[facility] Department of Neurology  Epilepsy Monitoring Unit video-Electroencephalogram    Patient Name:	SUKHJINDER DIEGO    :	1945  MRN:	[id number]    Study Start Date/Time:  2025, 12:14:52 AM  Study End Date/Time: in progress    Referred by: Dr. Souhel Najjar    Brief Clinical History:  SUKHJINDER DIEGO is a 80 year old Female with epilepsy, nausea and vomiting; study performed to investigate for seizures or markers of epilepsy.  Diagnosis Code:   R56.9 convulsions/seizure    Pertinent Medications:  Carbamazepine, Depakote (IV Vimpat given in lieu of Carbamazepine due to vomiting)    Acquisition Details:  Electroencephalography was acquired using a minimum of 21 channels on an [employer] system v 9.3.1 with electrode placement according to the standard International 10-20 system following ACNS (American Clinical Neurophysiology Society) guidelines for Long-Term Video EEG monitoring.  Anterior temporal T1 and T2 electrodes were utilized whenever possible.   The [employer] automated spike & seizure detections were all reviewed in detail, in addition to extensive portions of raw EEG.  The live video was monitored continuously by trained technicians to identify events and specialty nurses trained in seizure management supervised the care of the patient in the epilepsy unit.    Day 1: 2025 @ 12:14:52 AM to next morning @ 07:00 am  Background:  continuous, with predominantly theta>delta.frequencies.  Symmetry:  No persistent asymmetries of voltage or frequency.  Posterior Dominant Rhythm:  No clear PDR   Organization: Rudimentary.  Voltage:  Normal (20+ uV)  Variability: Yes. 		Reactivity: Yes.  N2 sleep: Symmetric, synchronous spindles and K complexes.  Spontaneous Activity:  Abundant (1+/10s) right> left temporal sharp wave discharges, at times periodic over the right temporal area in drowsiness, 1 Hz maximal and never evolving to seizures.   Periodic/rhythmic activity:  None.  Events:  No electrographic seizures or significant clinical events.  Provocations:  Hyperventilation and Photic stimulation: was not performed.    Daily Summary:    1)	Moderate generalized slowing suggestive of a similar degree of diffuse or multifocal  dysfunction.  2)	Abundant (1+/10s) right> left temporal sharp wave discharges, at times periodic over the right temporal area in drowsiness, 1 Hz maximal and never evolving to seizures.     Claudia Owens DO  Attending Neurologist, Health system Epilepsy Program

## 2025-04-29 NOTE — PHYSICAL THERAPY INITIAL EVALUATION ADULT - GENERAL OBSERVATIONS, REHAB EVAL
Pt received semi-supine in bed in no acute distress, OT Alejandra present. +vEEG +1:1 sit +primafit +Heplock +b/l mitten restraints

## 2025-04-29 NOTE — PROVIDER CONTACT NOTE (OTHER) - ASSESSMENT
A: Patient noted pulling on lines and EEG leads.
A: Patient continues to make attempts to jump out of bed, and notably anxious despite family member and one to one staff being at bedside

## 2025-04-29 NOTE — OCCUPATIONAL THERAPY INITIAL EVALUATION ADULT - ADDITIONAL COMMENTS
Pt is poor historian 2/2 impaired cognition. Pt reports living with her  (unable to identify if it is an apartment or private house). Pt reports requiring assistance for ADLs and IADLs, uses a RW for mobility tasks.

## 2025-04-29 NOTE — OCCUPATIONAL THERAPY INITIAL EVALUATION ADULT - MODIFIED CLINICAL TEST OF SENSORY INTEGRATION IN BALANCE TEST
Pt able to sit EOB for 5 minutes with max A, demonstrating impaired trunk control, retropulsion, and full body tremors noted t/o. Further mobility deferred 2/2 pt stating she is nauseous.

## 2025-04-29 NOTE — BH CONSULTATION LIAISON ASSESSMENT NOTE - HPI (INCLUDE ILLNESS QUALITY, SEVERITY, DURATION, TIMING, CONTEXT, MODIFYING FACTORS, ASSOCIATED SIGNS AND SYMPTOMS)
Patient evaluated at bedside; limited engagement due to current clinical status, but observed to have intermittent periods of wakefulness. Daughter was at bedside who provided collateral information, including corroborating the previously provided history. The patient was brought in due to decompensation after a breakthrough seizure last week. The patient has also been noted to be experiencing nausea and vomiting with a decline in ability to function in the past week. 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 Sleep disturbance has been a long standing issue, but agitation has only occurred in the hospital.

## 2025-04-29 NOTE — H&P ADULT - NSHPLABSRESULTS_GEN_ALL_CORE
< from: CT Head No Cont (04.28.25 @ 20:46) >    No evidence of acute intracranial pathology.    < end of copied text >    < from: CT Abdomen and Pelvis w/ IV Cont (04.28.25 @ 20:47) >    No acute findings.    < end of copied text >

## 2025-04-29 NOTE — SWALLOW BEDSIDE ASSESSMENT ADULT - ORAL PHASE
Slowed and prolonged manipulation of puree, requiring occ cues to increase alertness/ attention to task

## 2025-04-29 NOTE — CONSULT NOTE ADULT - SUBJECTIVE AND OBJECTIVE BOX
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 Collateral obtained from daughter and family friend at bedside. They reported that patient had a generalized toniconic seizure 3-minute duration on 4/24 aborted with midazolam intranasal. However, since then she has had episodes of staring near daily. It is unclear how many times a day it occurs. However, they seem to be occurring more frequently like once a week versus a month ago where it would occur once a month.      For last 2 days,  patient with nausea and vomiting, denied any sick contacts. Patient is similar to current baseline of waxing and waning mental status but with more staring episodes. Follows Dr Najjar outpatient and was advised to present to ED if patient does not return to baseline after seizure and to further work up etiology of N/V and decreased PO intake. Was supposed to follow up with Dr Najjar outpatient on 4/29. Daughter reported patient has had dramatically worsened cognition over the past one year because this time last year the patient was able to do chores and walk ambulate independently but over this past year has lost ability to do chores and now walks with walker. Normally can feed herself but on 4/28 needed to be fed by family    VITAL SIGNS: Last 24 Hours  T(C): 36.5 (28 Apr 2025 23:07), Max: 37.1 (28 Apr 2025 22:25)  T(F): 97.7 (28 Apr 2025 23:07), Max: 98.7 (28 Apr 2025 22:25)  HR: 95 (28 Apr 2025 23:07) (75 - 95)  BP: 157/76 (28 Apr 2025 23:07) (100/57 - 157/76)  BP(mean): --  RR: 18 (28 Apr 2025 23:07) (18 - 18)  SpO2: 95% (28 Apr 2025 23:07) (95% - 98%)    Admitted to EMU        Allergies and Intolerances:        Allergies:  	Dilantin: Drug, Hives  	Zonegran: Drug, Other, disorientation, unbalanced walking, somnolence, burning and swelling in lips, mouth  	phenobarbital: Drug, Unknown  	Bactrim: Drug, Unknown  	MSG: Food, Unknown       Intolerances:  	Trileptal: Drug, Nausea  	Topamax: Drug, Sedation/Somnol  	Neurontin: Drug, Unknown  	Keppra: Drug, Unknown  	antihistamines: Drug Category, Unknown  	shellfish: Food, Unknown  	Fish Products: Food, Unknown    Home Medications:   * Patient Currently Takes Medications as of 29-Apr-2025 03:39 documented in Structured Notes  · 	carBAMazepine 300 mg oral capsule, extended release: Last Dose Taken:  , 2 cap(s) orally 3 times a day  · 	levOCARNitine 330 mg oral tablet: Last Dose Taken:  , 2 tab(s) orally in the morning and at bedtime  · 	metoprolol succinate 25 mg oral tablet, extended release: Last Dose Taken:  , 1 tab(s) orally once a day  · 	LORazepam 1 mg oral tablet: Last Dose Taken:  , 1 tab(s) orally once a day as needed for  breakthrough seizures can repeat if needed MDD: 2mg  · 	Nayzilam 5 mg/inh nasal spray: Last Dose Taken:  , 1 spray(s) intranasally once a day as needed for  seizures lasting more than 3 minutes can repeat 10 minutes later if needed MDD: 10mg  · 	levOCARNitine 330 mg oral tablet: Last Dose Taken:  , 1 tab(s) orally once a day (in the afternoon)  · 	divalproex sodium 500 mg oral delayed release tablet: Last Dose Taken:  , 2 cap(s) orally 2 times a day  · 	melatonin 10 mg oral tablet: Last Dose Taken:  , 1 tab(s) orally once a day (at bedtime)  · 	Reclast 5 mg/100 mL intravenous solution: 5 milligram(s) intravenously yearly  · 	olmesartan 20 mg oral tablet: Last Dose Taken:  , 1 tab(s) orally once a day    .    Patient History:   Social History:  · Substance use	No    Tobacco Screening:  · Core Measure Site	No    Risk Assessment:   Present on Admission:  Deep Venous Thrombosis	no  Pulmonary Embolus	no    HIV Screening:  · In accordance with [place] [employer] law, we offer every patient who comes to our ED an HIV test. Would you like to be tested today?	Opt out    Hepatitis C Test Questions:  · In accordance with [place] State Law, we offer every patient a Hepatitis C test. Would you like to be tested today?	Opt out  "    Vital Signs Last 24 Hrs  T(C): 37 (29 Apr 2025 12:16), Max: 37.1 (28 Apr 2025 22:25)  T(F): 98.6 (29 Apr 2025 12:16), Max: 98.7 (28 Apr 2025 22:25)  HR: 104 (29 Apr 2025 12:16) (75 - 143)  BP: 110/68 (29 Apr 2025 12:16) (100/57 - 157/76)  BP(mean): --  RR: 20 (29 Apr 2025 12:16) (18 - 20)  SpO2: 96% (29 Apr 2025 12:16) (92% - 98%)    Parameters below as of 29 Apr 2025 12:16  Patient On (Oxygen Delivery Method): room air    Physical exam  General: lethargic, wearing mittens  heent: atraumatic, mmm  cards: rrr, normal s1s2  pulm: ctab, no wheeze  ab: soft, ntnd, normoactive bowel sounds  ext: wwp

## 2025-04-29 NOTE — BH CONSULTATION LIAISON ASSESSMENT NOTE - NSBHCHARTREVIEWVS_PSY_A_CORE FT
Vital Signs Last 24 Hrs  T(C): 37 (29 Apr 2025 12:16), Max: 37.1 (28 Apr 2025 22:25)  T(F): 98.6 (29 Apr 2025 12:16), Max: 98.7 (28 Apr 2025 22:25)  HR: 104 (29 Apr 2025 12:16) (75 - 143)  BP: 110/68 (29 Apr 2025 12:16) (100/57 - 157/76)  BP(mean): --  RR: 20 (29 Apr 2025 12:16) (18 - 20)  SpO2: 96% (29 Apr 2025 12:16) (92% - 98%)    Parameters below as of 29 Apr 2025 12:16  Patient On (Oxygen Delivery Method): room air

## 2025-04-29 NOTE — OCCUPATIONAL THERAPY INITIAL EVALUATION ADULT - LEVEL OF CONSCIOUSNESS, OT EVAL
----- Message from Halley Farrell MD sent at 4/20/2018  3:32 PM CDT -----  Please send letter , clarks mole right leg, benign , no need to treat, just monitor for any changes in any moles, yearly checks  Advised, sooner if concerns.   anxious/alert/confused

## 2025-04-29 NOTE — BH CONSULTATION LIAISON ASSESSMENT NOTE - NSBHCONSULTRECOMMENDOTHER_PSY_A_CORE FT
Recommendations  - restart melatonin 5mg QHS to promote sleep regulation  - consider Zyprexa 5mg IM Q4H for acute agitation (will re-assess when Seroquel 50mg PO may be appropriate)  - continue work-up per primary team; consider ammonia level  - non-medical interventions  	-orientation protocols: Provision of clocks, calendars, windows with outside views and frequent verbal reorientation of patients.  	-cognitive stimulation: Regular visits from family and friends. Avoid overstimulation (particularly at night)  	-facilitation of physiologic sleep: Nursing and medical procedures, including the administration of medications, should be avoided during sleeping when 		possible and night-time noise should be reduced.  	-early mobilization and minimized use of physical restraints for patients with limited mobility.   	-visual and hearing aids for patients with these impairments

## 2025-04-29 NOTE — PHYSICAL THERAPY INITIAL EVALUATION ADULT - IMPAIRMENTS CONTRIBUTING IMPAIRED BED MOBILITY, REHAB EVAL
dangle edge of bed x 5 min with max A x 1 for trunk control, retropulsive, decrease in full body tremors throughout dangle at edge of bed/impaired balance/impaired postural control/decreased strength

## 2025-04-29 NOTE — PHYSICAL THERAPY INITIAL EVALUATION ADULT - ORIENTATION, REHAB EVAL
knows hospital, not which; knows month, not year, states she is in the hospital for "seizures"/person/situation

## 2025-04-29 NOTE — BH CONSULTATION LIAISON ASSESSMENT NOTE - SUMMARY
The agitation is likely secondary to acute delirium which the patient has high risk of development due to predisposing factors (e.g., neurocognitive disorder; active illness; current medications). Current work up is evaluation for recent nausea/vomiting as well as breakthrough seizures, which may be addressed through medication optimization, including dosage adjustments. To minimize the risks of worsening delirium, will recommend using medications with least anticholinergic burden for severe agitation. Stabilizing her sleep cycle will also be essential, and beyond utilizing delirium precautions, can consider restarting melatonin nightly, but at a lower dosage than that at home. Physiological changes with aging can render previously tolerated doses of medications as excessive (i.e., melatonin may contribute to sleep disruptions due to residual daytime sedation; irritability, restlessness and anxiety; nausea and headaches).

## 2025-04-29 NOTE — OCCUPATIONAL THERAPY INITIAL EVALUATION ADULT - DIAGNOSIS, OT EVAL
Pt admitted for nausea, vomiting and possible subclinical seizures. Pt presents with generalized deconditioning, cognitive deficits, and impaired balance.

## 2025-04-29 NOTE — H&P ADULT - NSHPPHYSICALEXAM_GEN_ALL_CORE
GENERAL: patient agitated and not following commands    NEUROLOGIC:  Mental status: AOx1 name  Language: mild dysarthria  Cranial nerves:   II: Visual fields are full to confrontation  III, IV, VI: Extraocular movements intact, no noticeable nystagmus  Motor: moves all extremities  Sensation: Intact to light touch bilaterally  Reflexes:   - biceps     R 2+     L 2+  - brachioradialis     R 2+     L 2+  - patellar     R 2+     L 2+  - Achilles     R 2+     L 2+ GENERAL: patient agitated and not following commands--> on attending exam at 11:30 appeared drowsy but arousable to name     NEUROLOGIC:  Mental status: Arousable to name, oriented x2 name and [facility], says it is 1962, follows simple commands (stick out tongue)  Language: mild dysarthria  Cranial nerves:   II: Visual fields are full to confrontation  III, IV, VI: Extraocular movements intact, no noticeable nystagmus  Motor: moves all extremities antigravity  Sensation: Intact to light touch bilaterally  Reflexes:   - biceps     R 2+     L 2+  - brachioradialis     R 2+     L 2+  - patellar     R 2+     L 2+  - Achilles     R 2+     L 2+

## 2025-04-29 NOTE — H&P ADULT - ASSESSMENT
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     #suspect subclinical seizures  - depakote 1000mg twice daily (home dose)  - held home carbamazepine 600mg three times daily while patient unable to tolerate PO  - started vimpat 100mg twice daily   - vEEG  - depakote trough level 4/19    #nausea, vomiting  #dysphagia - failing bedside exam with nurse  - SLP eval  - may need NGT if failing PO intake  - IVF while NPO    #agitation  - trialed IM zyprexa 2.5mg  - consider seroquel if tolerating PO or NGT  - reoder home melatonin 10mg nightly if tolerating PO or NGT  - reorientation    #HTN  - holding metoprolol succinate 25mg qd while NPO  - holding olmesartan 20mg qd while NPO  - consider IV alternatives if hypertensive vs placing NGT if patient failing PO intake    --------------------------------------------------  #prophylactic measures  - DVT prophylaxis: lovenox / SCDs  - GI prophylaxis: not indicated at this time  - Diet: NPO  --------------------------------------------------  #disposition -   - PT / OT / Physiatry advises discharge to: pending   - patient planning to discharge to: pending        79yo Female w/ PMHx of 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     #suspect subclinical seizures  - depakote 1000mg twice daily (home dose)  - held home carbamazepine 600mg three times daily while patient unable to tolerate PO  - started vimpat 100mg twice daily   - vEEG  - depakote trough level 4/19    #nausea, vomiting  #dysphagia - failing bedside exam with nurse  - SLP eval  - may need NGT if failing PO intake  - IVF while NPO    #agitation  - trialed IM zyprexa 2.5mg  - consider seroquel if tolerating PO or NGT  - reoder home melatonin 10mg nightly if tolerating PO or NGT  - reorientation    #HTN  - holding metoprolol succinate 25mg qd while NPO > converted to IV metoprolol tartrate 5mg twice daily while NPO  - holding olmesartan 20mg qd while NPO  - consider IV alternatives if hypertensive vs placing NGT if patient failing PO intake    --------------------------------------------------  #prophylactic measures  - DVT prophylaxis: lovenox / SCDs  - GI prophylaxis: not indicated at this time  - Diet: NPO  --------------------------------------------------  #disposition -   - PT / OT / Physiatry advises discharge to: pending   - patient planning to discharge to: pending        81yo Female w/ PMHx of 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     #suspect subclinical seizures  - depakote 1000mg twice daily (home dose IV)  - held home carbamazepine 600mg three times daily while patient unable to tolerate PO  - started vimpat 100mg twice daily IV  - vEEG  - depakote trough level 4/19 as emergency room lab inaccurate    #nausea, vomiting  #dysphagia - failing bedside exam with nurse  - SLP eval  - may need NGT if failing PO intake  - IVF while NPO  - would appreciate medicine recs, ?GI consult  - check viral panel    #agitation  - trialed IM zyprexa 2.5mg  - consider seroquel if tolerating PO or NGT  - reoder home melatonin 10mg nightly if tolerating PO or NGT  - reorientation  - Psychiatry consult requested for assistance with agitation/ delirium management    #HTN  - holding metoprolol succinate 25mg qd while NPO > converted to IV metoprolol tartrate 5mg twice daily while NPO  - holding olmesartan 20mg qd while NPO  - consider IV alternatives if hypertensive vs placing NGT if patient failing PO intake    --------------------------------------------------  #prophylactic measures  - DVT prophylaxis: lovenox / SCDs  - GI prophylaxis: not indicated at this time  - Diet: NPO  --------------------------------------------------  #disposition -   - PT / OT / Physiatry advises discharge to: pending   - patient planning to discharge to: pending

## 2025-04-29 NOTE — PROVIDER CONTACT NOTE (OTHER) - RECOMMENDATIONS
R: patient requires alternative option to alleviate anxiety.
R: Patient maintained on one to one observation for impulsivity. Mittens/Medication in order to help patient sleep. Patient failed dysphagia screen, unable to tolerate PO meds at this time.

## 2025-04-29 NOTE — OCCUPATIONAL THERAPY INITIAL EVALUATION ADULT - GENERAL OBSERVATIONS, REHAB EVAL
Pt received semi-supine in bed, +IV line, +vEEG, +b/l mittens, +primafit, +PCA present, NAD, and agreeable to OT. PT Mandi present for evaluation. Cleared by LA Cho.

## 2025-04-29 NOTE — CHART NOTE - NSCHARTNOTEFT_GEN_A_CORE
Patient seen at bedside this morning, A&O x0, mumbling, incoherent, unable to follow commands/open eyes despite repeated speech and stimulation. Upon rounding with Dr. Owens this afternoon, patient arousable, opens eyes, A&O x2 to self and place but unable to follow other commands. Discussed with daughters Marylou (at bedside) and Dottie (on the phone) in regards to plan of care.       Plan:   - Restart carbamezapine at decreased dose: 450mg BID (one dose now) from 600mg TID at home. Plan to slowly taper according to Dr. Najjar's recommendation  - Continue vEEG monitoring  - Continue IV Vimpat 100mg BID and IV Valproate sodium 1g BID   - Appreciate hospitalist recs: suppository  - Appreciate psych recs: ammonia level, depakote level, melatonin 5mg at bedtime to promote sleep. and Zyprexa 5mg Q4H PRN when agitated  - Follow up Labs: ammonia, RVP, depakote, TSH, folate, B12.   - Ativan 2mg IVP PRN for seizures > 2mins  - Neurological & Vitals assessment Q8hrs  - Maintain Seizure/Fall/Aspiration precautions Patient seen at bedside this morning, A&O x0, mumbling, incoherent, unable to follow commands/open eyes despite repeated speech and stimulation. Upon rounding with Dr. Owens this afternoon, patient arousable, opens eyes, A&O x2 to self and place but unable to follow other commands. Discussed with daughters Marylou (at bedside) and Dottie (on the phone) in regards to plan of care.       Plan:   - Restart carbamezapine at decreased dose: 450mg BID (one dose now) from 600mg TID at home. Plan to slowly taper according to Dr. Najjar's recommendation  - Continue vEEG monitoring  - Continue IV Vimpat 100mg BID and IV Valproate sodium 1g BID   - Appreciate hospitalist recs: suppository  - Appreciate psych recs: ammonia level, depakote level, melatonin 5mg at bedtime to promote sleep. and Zyprexa 5mg PRN when agitated  - Follow up Labs: ammonia, RVP, depakote, TSH, folate, B12.   - Ativan 2mg IVP PRN for seizures > 2mins  - Neurological & Vitals assessment Q8hrs  - Maintain Seizure/Fall/Aspiration precautions

## 2025-04-29 NOTE — CONSULT NOTE ADULT - ASSESSMENT
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     #suspect subclinical seizures  - depakote 1000mg twice daily (home dose)  - held home carbamazepine 600mg three times daily while patient unable to tolerate PO  - started vimpat 100mg twice daily   - vEEG  - depakote trough level 4/19    #nausea, vomiting  #dysphagia - failing bedside exam with nurse  - SLP eval  - may need NGT if failing PO intake  - IVF while NPO    #agitation  - trialed IM zyprexa 2.5mg  - consider seroquel if tolerating PO or NGT  - reoder home melatonin 10mg nightly if tolerating PO or NGT  - reorientation    #HTN  - holding metoprolol succinate 25mg qd while NPO > converted to IV metoprolol tartrate 5mg twice daily while NPO  - holding olmesartan 20mg qd while NPO  - consider IV alternatives if hypertensive vs placing NGT if patient failing PO intake    --------------------------------------------------  #prophylactic measures  - DVT prophylaxis: lovenox / SCDs  - GI prophylaxis: not indicated at this time  - Diet: NPO  --------------------------------------------------  #disposition -   - PT / OT / Physiatry advises discharge to: pending   - patient planning to discharge to: pending

## 2025-04-29 NOTE — SWALLOW BEDSIDE ASSESSMENT ADULT - SLP PERTINENT HISTORY OF CURRENT PROBLEM
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 Patient is similar to current baseline of waxing and waning mental status but with more staring episodes. Daughter reported patient has had dramatically worsened cognition over the past one year. Normally can feed herself but on 4/28 needed to be fed by family

## 2025-04-29 NOTE — PHYSICAL THERAPY INITIAL EVALUATION ADULT - PERTINENT HX OF CURRENT PROBLEM, REHAB EVAL
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 Collateral obtained from daughter and family friend at bedside. They reported that patient had a generalized toniconic seizure 3-minute duration on 4/24 aborted with midazolam intranasal. However, since then she has had episodes of staring near daily. It is unclear how many times a day it occurs. However, they seem to be occurring more frequently like once a week versus a month ago where it would occur once a month.  On 4/28/25 patient was nauseous and vomiting, denied any sick contacts.

## 2025-04-29 NOTE — PHYSICAL THERAPY INITIAL EVALUATION ADULT - ADDITIONAL COMMENTS
Pt is a semi poor historian as pt is confused however states she lives with her  who assists her and uses a walker.

## 2025-04-29 NOTE — SWALLOW BEDSIDE ASSESSMENT ADULT - SWALLOW EVAL: RECOMMENDED FEEDING/EATING TECHNIQUES
alternate food with liquid/crush medication (when feasible)/oral hygiene/position upright (90 degrees)/small sips/bites

## 2025-04-29 NOTE — H&P ADULT - HISTORY OF PRESENT ILLNESS
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 Collateral obtained from daughter and family friend at bedside. They reported that patient had a generalized toniconic seizure 3-minute duration on 4/24 aborted with midazolam intranasal. However, since then she has had episodes of staring near daily. It is unclear how many times a day it occurs. However, they seem to be occurring more frequently like once a week versus a month ago where it would occur once a month.  On 4/28/25 patient was nauseous and vomiting, denied any sick contacts. Patient is similar to current baseline of waxing and waning mental status but with more staring episodes. Follows Dr Najjar outpatient and was advised to present to ED if patient does not return to baseline after seizure. Was supposed to follow up with Dr Najjar outpatient on 4/29. Daughter reported patient has had dramatically worsened cognition over the past one year because this time last year the patient was able to do chores and walk ambulate independently but over this past year has lost ability to do chores and now walks with walker. 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 Collateral obtained from daughter and family friend at bedside. They reported that patient had a generalized toniconic seizure 3-minute duration on 4/24 aborted with midazolam intranasal. However, since then she has had episodes of staring near daily. It is unclear how many times a day it occurs. However, they seem to be occurring more frequently like once a week versus a month ago where it would occur once a month.  On 4/28/25 patient was nauseous and vomiting, denied any sick contacts. Patient is similar to current baseline of waxing and waning mental status but with more staring episodes. Follows Dr Najjar outpatient and was advised to present to ED if patient does not return to baseline after seizure. Was supposed to follow up with Dr Najjar outpatient on 4/29. Daughter reported patient has had dramatically worsened cognition over the past one year because this time last year the patient was able to do chores and walk ambulate independently but over this past year has lost ability to do chores and now walks with walker. 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 Collateral obtained from daughter and family friend at bedside. They reported that patient had a generalized toniconic seizure 3-minute duration on 4/24 aborted with midazolam intranasal. However, since then she has had episodes of staring near daily. It is unclear how many times a day it occurs. However, they seem to be occurring more frequently like once a week versus a month ago where it would occur once a month.      For last 2 days,  patient with nausea and vomiting, denied any sick contacts. Patient is similar to current baseline of waxing and waning mental status but with more staring episodes. Follows Dr Najjar outpatient and was advised to present to ED if patient does not return to baseline after seizure and to further work up etiology of N/V and decreased PO intake. Was supposed to follow up with Dr Najjar outpatient on 4/29. Daughter reported patient has had dramatically worsened cognition over the past one year because this time last year the patient was able to do chores and walk ambulate independently but over this past year has lost ability to do chores and now walks with walker. Normally can feed herself but on 4/28 needed to be fed by family    VITAL SIGNS: Last 24 Hours  T(C): 36.5 (28 Apr 2025 23:07), Max: 37.1 (28 Apr 2025 22:25)  T(F): 97.7 (28 Apr 2025 23:07), Max: 98.7 (28 Apr 2025 22:25)  HR: 95 (28 Apr 2025 23:07) (75 - 95)  BP: 157/76 (28 Apr 2025 23:07) (100/57 - 157/76)  BP(mean): --  RR: 18 (28 Apr 2025 23:07) (18 - 18)  SpO2: 95% (28 Apr 2025 23:07) (95% - 98%)    Admitted to EMU

## 2025-04-29 NOTE — SWALLOW BEDSIDE ASSESSMENT ADULT - SLP GENERAL OBSERVATIONS
Pt appreciated asleep in bed with b/l mitten restraints. Pt awoke with verbal stim however required intermittent cues to maintain arousal. Pt inconsistently followed simple commands for oral mech exam. Altered resonance- [place]. Unintelligible mumbling noted.

## 2025-04-29 NOTE — SWALLOW BEDSIDE ASSESSMENT ADULT - SWALLOW EVAL: DIAGNOSIS
Pt with oral phase deficits rudy by prolonged and disorganized manipulation i/s/o AMS and fluctuating lethargy. No overt s/s of aspiration or pharyngeal dysphagia. Pt presents at an increased risk for aspiration related PNA given AMS, reduced mobility, and dependence on others for feeding. Therefore, recommend puree/ thin liquids with aspiration precautions- feed only when awake and alert.

## 2025-04-29 NOTE — CONSULT NOTE ADULT - SUBJECTIVE AND OBJECTIVE BOX
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 Collateral obtained from daughter and family friend at bedside. They reported that patient had a generalized toniconic seizure 3-minute duration on 4/24 aborted with midazolam intranasal. However, since then she has had episodes of staring near daily. It is unclear how many times a day it occurs. However, they seem to be occurring more frequently like once a week versus a month ago where it would occur once a month.  On 4/28/25 patient was nauseous and vomiting, denied any sick contacts. Patient is similar to current baseline of waxing and waning mental status but with more staring episodes. Follows Dr Najjar outpatient and was advised to present to ED if patient does not return to baseline after seizure. Was supposed to follow up with Dr Najjar outpatient on 4/29. Daughter reported patient has had dramatically worsened cognition over the past one year because this time last year the patient was able to do chores and walk ambulate independently but over this past year has lost ability to do chores and now walks with walker. Normally can feed herself but on 4/28 needed to be fed by family    VITAL SIGNS: Last 24 Hours  T(C): 36.5 (28 Apr 2025 23:07), Max: 37.1 (28 Apr 2025 22:25)  T(F): 97.7 (28 Apr 2025 23:07), Max: 98.7 (28 Apr 2025 22:25)  HR: 95 (28 Apr 2025 23:07) (75 - 95)  BP: 157/76 (28 Apr 2025 23:07) (100/57 - 157/76)  BP(mean): --  RR: 18 (28 Apr 2025 23:07) (18 - 18)  SpO2: 95% (28 Apr 2025 23:07) (95% - 98%)    Admitted to EMU (29 Apr 2025 03:20)    PAST MEDICAL & SURGICAL HISTORY:  Breast cancer, stage 1      History of brain tumor      Epilepsy      HTN (hypertension)      S/P breast lumpectomy      H/O excision of tumor of brain meninges        MEDICATIONS  (STANDING):  enoxaparin Injectable 40 milliGRAM(s) SubCutaneous every 24 hours  lacosamide IVPB 100 milliGRAM(s) IV Intermittent every 12 hours  metoprolol tartrate Injectable 5 milliGRAM(s) IV Push every 12 hours  sodium chloride 0.9%. 1000 milliLiter(s) (100 mL/Hr) IV Continuous <Continuous>  valproate sodium   IVPB 1000 milliGRAM(s) IV Intermittent every 12 hours    MEDICATIONS  (PRN):  LORazepam   Injectable 2 milliGRAM(s) IV Push once PRN Seizure Activity  ondansetron Injectable 4 milliGRAM(s) IV Push every 8 hours PRN Nausea and/or Vomiting      FAMILY HISTORY:  No pertinent family history in first degree relatives        CBC Full  -  ( 29 Apr 2025 07:00 )  WBC Count : 10.07 K/uL  RBC Count : 4.20 M/uL  Hemoglobin : 13.6 g/dL  Hematocrit : 41.3 %  Platelet Count - Automated : 162 K/uL  Mean Cell Volume : 98.3 fl  Mean Cell Hemoglobin : 32.4 pg  Mean Cell Hemoglobin Concentration : 32.9 g/dL  Auto Neutrophil # : x  Auto Lymphocyte # : x  Auto Monocyte # : x  Auto Eosinophil # : x  Auto Basophil # : x  Auto Neutrophil % : x  Auto Lymphocyte % : x  Auto Monocyte % : x  Auto Eosinophil % : x  Auto Basophil % : x      04-28    138  |  100  |  30[H]  ----------------------------<  106[H]  4.6   |  29  |  0.61    Ca    9.4      28 Apr 2025 17:37  Mg     2.2     04-28    TPro  6.9  /  Alb  3.8  /  TBili  0.2  /  DBili  x   /  AST  17  /  ALT  11  /  AlkPhos  38[L]  04-28      Urinalysis Basic - ( 28 Apr 2025 21:41 )    Color: Dark Yellow / Appearance: Clear / SG: >1.030 / pH: x  Gluc: x / Ketone: Trace mg/dL  / Bili: Negative / Urobili: 0.2 mg/dL   Blood: x / Protein: Trace mg/dL / Nitrite: Negative   Leuk Esterase: Negative / RBC: x / WBC x   Sq Epi: x / Non Sq Epi: x / Bacteria: x        Radiology :     ACC: [id number] EXAM:  CT BRAIN   ORDERED BY: SANCHEZ ALLEN     PROCEDURE DATE:  04/28/2025          INTERPRETATION:  CLINICAL INFORMATION: hx brain tumor s/p resection, ?   seizures vomiting    COMPARISON: MRI brain 8/30/2024.    CONTRAST:  IVContrast: None    TECHNIQUE:  Serial axial images were obtained from the skull base to the   vertex using multi-slice helical technique. Sagittal and coronal   reformats were obtained.    FINDINGS:    VENTRICLES AND SULCI: Age appropriate involutional changes.  INTRA-AXIAL: Redemonstrated resection of the right anterior and mesial   temporal lobe. Stable large area of cystic encephalomalacia and gliosis   in the right frontal and temporal lobes. No acute transcortical   infarction. No acute hemorrhage or midline shift.  EXTRA-AXIAL: CSF density fluid collection deep to the craniotomy site   appears grossly unchanged to slightly decreased. No acute hemorrhage.   Basal cisterns are normal in appearance.    VISUALIZED SINUSES:  Clear.  TYMPANOMASTOID CAVITIES:  Clear.  VISUALIZED ORBITS: Normal.  CALVARIUM: Redemonstrated right sided craniotomy.      IMPRESSION:  No evidence of acute intracranial pathology.    ACC: [id number] EXAM:  CT ABDOMEN AND PELVIS IC   ORDERED BY: SANCHEZ ALLEN     PROCEDURE DATE:  04/28/2025          INTERPRETATION:  CLINICAL INFORMATION: Vomiting.    COMPARISON: None.    CONTRAST/COMPLICATIONS:  IV Contrast: Isovue 370  90cc administered   10 cc discarded  Oral Contrast: None    PROCEDURE:  CT of the Abdomen and Pelvis was performed.  Sagittal and coronal reformats were performed.    FINDINGS:  LOWER CHEST: Bibasilar linear atelectasis. Cardiomegaly. Coronary and   mitral annular calcifications.    LIVER: 4 cm cyst in the left lobe. Few subcentimeter hypodensities in the   right lobe are too small to characterize.  BILE DUCTS: Normal caliber.  GALLBLADDER: Within normal limits.  SPLEEN: Within normal limits.  PANCREAS: Within normal limits.  ADRENALS: Within normal limits.  KIDNEYS/URETERS: No hydronephrosis. Symmetric nephrograms. Several   bilateral subcentimeter renal hypodensities are too small to characterize.    BLADDER: Within normal limits.  REPRODUCTIVE ORGANS: Uterus and adnexa within normal limits.    BOWEL: No bowel obstruction. Rectum is filled with multiple small stool   balls. Moderate right colonic stool burden. Colonic diverticulosis   without diverticulitis. Appendix is not visualized. Noevidence of   inflammation in the pericecal region.  PERITONEUM/RETROPERITONEUM: Within normal limits.  VESSELS: Atherosclerotic changes.  LYMPH NODES: No lymphadenopathy.  ABDOMINAL WALL: Within normal limits.  BONES: Degenerative changes. S-shaped thoracolumbar scoliosis. Probable   T9 vertebral body hemangioma.    IMPRESSION:  No acute findings.            Review of Systems : per HPI         Vital Signs Last 24 Hrs  T(C): 36.3 (29 Apr 2025 06:04), Max: 37.1 (28 Apr 2025 22:25)  T(F): 97.3 (29 Apr 2025 06:04), Max: 98.7 (28 Apr 2025 22:25)  HR: 106 (29 Apr 2025 06:04) (75 - 143)  BP: 117/79 (29 Apr 2025 06:04) (100/57 - 157/76)  BP(mean): --  RR: 18 (29 Apr 2025 06:04) (18 - 20)  SpO2: 95% (29 Apr 2025 06:04) (92% - 98%)    Parameters below as of 29 Apr 2025 06:04  Patient On (Oxygen Delivery Method): room air            Physical Exam:  80 y o woman lying comfortably in semi Powell's position , awake , alert ,  NAD     Head: normocephalic , atraumatic    Eyes: PERRLA , EOMI , no nystagmus , sclera anicteric    ENT / FACE: neg nasal discharge , uvula midline , no oropharyngeal erythema / exudate    Neck: supple , negative JVD , negative carotid bruits , no thyromegaly    Chest: CTA bilaterally     Cardiovascular: regular rate and rhythm , neg murmurs / rubs / gallops    Abdomen: soft , non distended , no tenderness to palpation in all 4 quadrants ,  normal bowel sounds     Extremities: WWP , neg cyanosis /clubbing / edema     Neurologic Exam:     Alert and oriented to person , speech fluent w/ mild dysarthria , follows commands     Cranial Nerves:           II:                         pupils equal , round and reactive to light , visual fields intact         III/ IV/VI:             extraocular movements intact , neg nystagmus , neg ptosis        V:                        facial sensation intact , V1-3 normal        VII:                      face symmetric , no droop , normal eye closure and smile        VIII:                     hearing intact to finger rub bilaterally        IX and X:             no hoarseness , gag intact , palate/ uvula rise symmetrically        XI:                       SCM / trapezius strength intact bilateral        XII:                      no tongue deviation    Motor Exam:        > 3+/5 x 4 extremities , without drift     Sensation:         intact to light touch x 4 extremities                            no neglect or extinction on double simultaneous testing    DTR:           biceps/brachioradialis: equal                            patella/ankle: equal          neg Babinski      Coordination:            Finger to Nose:  neg dysmetria bilaterally        Gait:  not tested         PM&R Impression: admitted for nausea, vomiting and possible subclinical seizures    - no acute pathology on CT brain imaging         Recommendations / Plan:       1) Physical / Occupational therapy focusing on therapeutic exercises , equipment evaluation , bed mobility/transfer out of bed evaluation , progressive ambulation with assistive devices prn .    2) Current disposition plan recommendation:    pending functional progress

## 2025-04-29 NOTE — CONSULT NOTE ADULT - ASSESSMENT
80F with PMH of brain tumor resection in 2003, alzheimer's dementia, seizures, breast cancer and hypertension, admitted to the epilepsy service with nausea, vomiting and worsening cognition with increased frequency of staring spell and overall decline.     #Seizures  #Dementia  - plan per epilepsy team  - currently on vEEG   - carbamazepine 450mg BID  - vimpat 100mg IV every 12 hours  - depakote 1000mg every 12 hours    #Nausea, vomiting, constipation  - large stool burden without obstruction seen on abdominal CT obtained in the ER  - stool seen in rectum as well - recommend suppository and/or enema  - per discussion with epilepsy NP, team planning on consulted GI - appreciate recommendations  - patient passed SLP evaluation, and has been advanced to pureed foods    #HTN  - can continue home antihypertensive regimen, with holding parameters    75 minutes spent on this encounter, including face to face with patient, review of chart, care coordination and documentation.  Plan discussed with epilepsy team.

## 2025-04-30 DIAGNOSIS — Z29.9 ENCOUNTER FOR PROPHYLACTIC MEASURES, UNSPECIFIED: ICD-10-CM

## 2025-04-30 DIAGNOSIS — R11.2 NAUSEA WITH VOMITING, UNSPECIFIED: ICD-10-CM

## 2025-04-30 DIAGNOSIS — G30.9 ALZHEIMER'S DISEASE, UNSPECIFIED: ICD-10-CM

## 2025-04-30 DIAGNOSIS — R56.9 UNSPECIFIED CONVULSIONS: ICD-10-CM

## 2025-04-30 DIAGNOSIS — I10 ESSENTIAL (PRIMARY) HYPERTENSION: ICD-10-CM

## 2025-04-30 LAB
AMMONIA BLD-MCNC: 41 UMOL/L — SIGNIFICANT CHANGE UP (ref 11–55)
FOLATE SERPL-MCNC: >20 NG/ML — SIGNIFICANT CHANGE UP
HIV-1 VIRAL LOAD RESULT: SIGNIFICANT CHANGE UP
HIV1 RNA # SERPL NAA+PROBE: SIGNIFICANT CHANGE UP COPIES/ML
HIV1 RNA SER-IMP: SIGNIFICANT CHANGE UP
HIV1 RNA SERPL NAA+PROBE-ACNC: SIGNIFICANT CHANGE UP
HIV1 RNA SERPL NAA+PROBE-LOG#: SIGNIFICANT CHANGE UP LG COP/ML
VIT B12 SERPL-MCNC: 1318 PG/ML — HIGH (ref 232–1245)

## 2025-04-30 PROCEDURE — 99232 SBSQ HOSP IP/OBS MODERATE 35: CPT

## 2025-04-30 PROCEDURE — 95720 EEG PHY/QHP EA INCR W/VEEG: CPT

## 2025-04-30 PROCEDURE — 99231 SBSQ HOSP IP/OBS SF/LOW 25: CPT

## 2025-04-30 PROCEDURE — 99221 1ST HOSP IP/OBS SF/LOW 40: CPT

## 2025-04-30 PROCEDURE — 71045 X-RAY EXAM CHEST 1 VIEW: CPT | Mod: 26

## 2025-04-30 PROCEDURE — 93321 DOPPLER ECHO F-UP/LMTD STD: CPT | Mod: 26

## 2025-04-30 PROCEDURE — 99223 1ST HOSP IP/OBS HIGH 75: CPT

## 2025-04-30 PROCEDURE — 93308 TTE F-UP OR LMTD: CPT | Mod: 26

## 2025-04-30 RX ORDER — OLANZAPINE 10 MG/1
5 TABLET ORAL
Refills: 0 | Status: DISCONTINUED | OUTPATIENT
Start: 2025-04-30 | End: 2025-05-01

## 2025-04-30 RX ORDER — OLANZAPINE 10 MG/1
2.5 TABLET ORAL ONCE
Refills: 0 | Status: COMPLETED | OUTPATIENT
Start: 2025-04-30 | End: 2025-04-30

## 2025-04-30 RX ORDER — ESCITALOPRAM OXALATE 20 MG/1
5 TABLET ORAL
Refills: 0 | Status: DISCONTINUED | OUTPATIENT
Start: 2025-04-30 | End: 2025-05-04

## 2025-04-30 RX ORDER — LACOSAMIDE 150 MG/1
150 TABLET, FILM COATED ORAL EVERY 12 HOURS
Refills: 0 | Status: DISCONTINUED | OUTPATIENT
Start: 2025-04-30 | End: 2025-05-01

## 2025-04-30 RX ORDER — BACITRACIN 500 UNIT/G
1 OINTMENT (GRAM) TOPICAL THREE TIMES A DAY
Refills: 0 | Status: DISCONTINUED | OUTPATIENT
Start: 2025-04-30 | End: 2025-05-04

## 2025-04-30 RX ORDER — OLANZAPINE 10 MG/1
2.5 TABLET ORAL
Refills: 0 | Status: DISCONTINUED | OUTPATIENT
Start: 2025-04-30 | End: 2025-04-30

## 2025-04-30 RX ORDER — OLANZAPINE 10 MG/1
2.5 TABLET ORAL EVERY 12 HOURS
Refills: 0 | Status: DISCONTINUED | OUTPATIENT
Start: 2025-04-30 | End: 2025-05-01

## 2025-04-30 RX ADMIN — Medication 1 APPLICATION(S): at 22:27

## 2025-04-30 RX ADMIN — Medication 1000 MILLILITER(S): at 00:22

## 2025-04-30 RX ADMIN — LACOSAMIDE 130 MILLIGRAM(S): 150 TABLET, FILM COATED ORAL at 20:50

## 2025-04-30 RX ADMIN — OLANZAPINE 2.5 MILLIGRAM(S): 10 TABLET ORAL at 04:48

## 2025-04-30 RX ADMIN — METOPROLOL SUCCINATE 2.5 MILLIGRAM(S): 50 TABLET, EXTENDED RELEASE ORAL at 12:33

## 2025-04-30 RX ADMIN — LACOSAMIDE 120 MILLIGRAM(S): 150 TABLET, FILM COATED ORAL at 09:37

## 2025-04-30 RX ADMIN — METOPROLOL SUCCINATE 2.5 MILLIGRAM(S): 50 TABLET, EXTENDED RELEASE ORAL at 17:44

## 2025-04-30 RX ADMIN — Medication 60 MILLIGRAM(S): at 00:22

## 2025-04-30 RX ADMIN — Medication 1000 MILLILITER(S): at 01:08

## 2025-04-30 RX ADMIN — Medication 5 MILLIGRAM(S): at 22:26

## 2025-04-30 RX ADMIN — ENOXAPARIN SODIUM 40 MILLIGRAM(S): 100 INJECTION SUBCUTANEOUS at 06:33

## 2025-04-30 RX ADMIN — METOPROLOL SUCCINATE 2.5 MILLIGRAM(S): 50 TABLET, EXTENDED RELEASE ORAL at 06:32

## 2025-04-30 RX ADMIN — Medication 100 MILLILITER(S): at 18:47

## 2025-04-30 RX ADMIN — METOPROLOL SUCCINATE 2.5 MILLIGRAM(S): 50 TABLET, EXTENDED RELEASE ORAL at 22:27

## 2025-04-30 RX ADMIN — OLANZAPINE 5 MILLIGRAM(S): 10 TABLET ORAL at 02:44

## 2025-04-30 RX ADMIN — OLANZAPINE 2.5 MILLIGRAM(S): 10 TABLET ORAL at 07:08

## 2025-04-30 RX ADMIN — Medication 100 MILLILITER(S): at 00:22

## 2025-04-30 RX ADMIN — CARBAMAZEPINE 450 MILLIGRAM(S): 200 TABLET ORAL at 21:02

## 2025-04-30 RX ADMIN — Medication 60 MILLIGRAM(S): at 12:33

## 2025-04-30 NOTE — PROGRESS NOTE ADULT - PROBLEM SELECTOR PLAN 2
Nausea and vomiting for a few days.    - large stool burden without obstruction seen on abdominal CT obtained in the ER.  - 4/29 suppositories, enema and disimpaction performed with improvement.  - 4/29 GI consulted but will follow up on 4/30 given that patient was agitated.   - Speech therapist consulted for swallow evaluation and appreciate recs: recommend puree/ thin liquids with aspiration precautions- feed only when awake and alert (but lethargic in AM) Nausea and vomiting for a few days, resolved.    - large stool burden without obstruction seen on abdominal CT obtained in the ER.  - 4/29 suppositories, enema and disimpaction performed with improvement.  - 4/29 GI consulted but will follow up on 4/30 given that patient was agitated.   - Speech therapist consulted for swallow evaluation and appreciate recs: recommend puree/ thin liquids with aspiration precautions- feed only when awake and alert (but lethargic in AM)

## 2025-04-30 NOTE — CONSULT NOTE ADULT - SUBJECTIVE AND OBJECTIVE BOX
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 Collateral obtained from daughter and family friend at bedside. They reported that patient had a generalized toniconic seizure 3-minute duration on 4/24 aborted with midazolam intranasal. However, since then she has had episodes of staring near daily. It is unclear how many times a day it occurs. However, they seem to be occurring more frequently like once a week versus a month ago where it would occur once a month.      For last 2 days,  patient with nausea and vomiting, denied any sick contacts. Patient is similar to current baseline of waxing and waning mental status but with more staring episodes. Follows Dr Najjar outpatient and was advised to present to ED if patient does not return to baseline after seizure and to further work up etiology of N/V and decreased PO intake. Was supposed to follow up with Dr Najjar outpatient on 4/29. Daughter reported patient has had dramatically worsened cognition over the past one year because this time last year the patient was able to do chores and walk ambulate independently but over this past year has lost ability to do chores and now walks with walker. Normally can feed herself but on 4/28 needed to be fed by family    Known history of atrial tachycardia with 1st degree AVB.     VITAL SIGNS: Last 24 Hours  T(C): 36.5 (28 Apr 2025 23:07), Max: 37.1 (28 Apr 2025 22:25)  T(F): 97.7 (28 Apr 2025 23:07), Max: 98.7 (28 Apr 2025 22:25)  HR: 95 (28 Apr 2025 23:07) (75 - 95)  BP: 157/76 (28 Apr 2025 23:07) (100/57 - 157/76)  BP(mean): --  RR: 18 (28 Apr 2025 23:07) (18 - 18)  SpO2: 95% (28 Apr 2025 23:07) (95% - 98%)    Admitted to EMU (29 Apr 2025 03:20)      ROS: A 10-point review of systems was otherwise negative.    PAST MEDICAL & SURGICAL HISTORY:  Breast cancer, stage 1  History of brain tumor  Epilepsy  HTN (hypertension)  S/P breast lumpectomy  H/O excision of tumor of brain meninges      SOCIAL HISTORY:  FAMILY HISTORY:  No pertinent family history in first degree relatives    ALLERGIES: 	  Zonegran (Other)  antihistamines (Unknown)  Keppra (Unknown)  Fish Products (Unknown)  Dilantin (Hives)  Bactrim (Unknown)  phenobarbital (Unknown)  Neurontin (Unknown)  Topamax (Sedation/Somnol)  Trileptal (Nausea)  MSG (Unknown)  shellfish (Unknown)    MEDICATIONS:  bacitracin   Ointment 1 Application(s) Topical three times a day  bisacodyl 5 milliGRAM(s) Oral every 12 hours PRN  carBAMazepine Chewable 450 milliGRAM(s) Chew <User Schedule>  enoxaparin Injectable 40 milliGRAM(s) SubCutaneous every 24 hours  escitalopram 5 milliGRAM(s) Oral <User Schedule>  lacosamide IVPB 150 milliGRAM(s) IV Intermittent every 12 hours  LORazepam   Injectable 2 milliGRAM(s) IV Push once PRN  melatonin 5 milliGRAM(s) Oral at bedtime  metoprolol tartrate Injectable 2.5 milliGRAM(s) IV Push every 6 hours  OLANZapine Injectable 2.5 milliGRAM(s) IntraMuscular every 12 hours PRN  OLANZapine Injectable 5 milliGRAM(s) IntraMuscular <User Schedule>  OLANZapine Injectable 5 milliGRAM(s) IntraMuscular every 8 hours PRN  ondansetron Injectable 4 milliGRAM(s) IV Push every 8 hours PRN  sodium chloride 0.9%. 1000 milliLiter(s) IV Continuous <Continuous>  valproate sodium   IVPB 1000 milliGRAM(s) IV Intermittent every 12 hours      HOME MEDICATIONS:  carBAMazepine 300 mg oral capsule, extended release: 2 cap(s) orally 3 times a day  divalproex sodium 500 mg oral delayed release tablet: 2 cap(s) orally 2 times a day  levOCARNitine 330 mg oral tablet: 2 tab(s) orally in the morning and at bedtime  levOCARNitine 330 mg oral tablet: 1 tab(s) orally once a day (in the afternoon)  LORazepam 1 mg oral tablet: 1 tab(s) orally once a day as needed for  breakthrough seizures can repeat if needed MDD: 2mg  melatonin 10 mg oral tablet: 1 tab(s) orally once a day (at bedtime)  metoprolol succinate 25 mg oral tablet, extended release: 1 tab(s) orally once a day  Nayzilam 5 mg/inh nasal spray: 1 spray(s) intranasally once a day as needed for  seizures lasting more than 3 minutes can repeat 10 minutes later if needed MDD: 10mg  olmesartan 20 mg oral tablet: 1 tab(s) orally once a day  Reclast 5 mg/100 mL intravenous solution: 5 milligram(s) intravenously yearly      PHYSICAL EXAM:    I/O Summary 24H    IN: 500 mL / OUT: 225 mL / NET: 275 mL    T(F): 99.4 (04-30-25 @ 20:40), Max: 99.4 (04-30-25 @ 20:40)  HR: 123 (04-30-25 @ 22:21) (85 - 126)  BP: 101/68 (04-30-25 @ 22:21) (94/60 - 161/78)  BP(mean): 105 (04-30-25 @ 03:15) (97 - 105)  ABP: --  ABP(mean): --  RR: 18 (04-30-25 @ 20:40) (18 - 20)  SpO2: 97% (04-30-25 @ 20:40) (95% - 98%)  CVP(mm Hg): --    GEN: Awake, comfortable. NAD.   HEENT: NCAT, PERRL, EOMI. Mucosa moist. No JVD.   RESP: CTA b/l  CV: RRR, normal s1/s2. No m/r/g.  ABD: Soft, NTND. BS+  EXT: Warm. No edema, clubbing, or cyanosis.   NEURO: AAOx3. No focal deficits.  	  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 Admitted to neurology service for further workup.     Noted to have elevated heart rate on 4/30 for which cardiology was consulted. Patient seen & examined at bedside with daughter. Since admission on 4/28, home metoprolol succinate was held due to mentation and converted to IV lopressor 2.5mg IV q6h.     ROS: A 10-point review of systems was otherwise negative.    PAST MEDICAL & SURGICAL HISTORY:  Breast cancer, stage 1  History of brain tumor  Epilepsy  HTN (hypertension)  S/P breast lumpectomy  H/O excision of tumor of brain meninges      SOCIAL HISTORY:  FAMILY HISTORY:  No pertinent family history in first degree relatives    ALLERGIES: 	  Zonegran (Other)  antihistamines (Unknown)  Keppra (Unknown)  Fish Products (Unknown)  Dilantin (Hives)  Bactrim (Unknown)  phenobarbital (Unknown)  Neurontin (Unknown)  Topamax (Sedation/Somnol)  Trileptal (Nausea)  MSG (Unknown)  shellfish (Unknown)    MEDICATIONS:  bacitracin   Ointment 1 Application(s) Topical three times a day  bisacodyl 5 milliGRAM(s) Oral every 12 hours PRN  carBAMazepine Chewable 450 milliGRAM(s) Chew <User Schedule>  enoxaparin Injectable 40 milliGRAM(s) SubCutaneous every 24 hours  escitalopram 5 milliGRAM(s) Oral <User Schedule>  lacosamide IVPB 150 milliGRAM(s) IV Intermittent every 12 hours  LORazepam   Injectable 2 milliGRAM(s) IV Push once PRN  melatonin 5 milliGRAM(s) Oral at bedtime  metoprolol tartrate Injectable 2.5 milliGRAM(s) IV Push every 6 hours  OLANZapine Injectable 2.5 milliGRAM(s) IntraMuscular every 12 hours PRN  OLANZapine Injectable 5 milliGRAM(s) IntraMuscular <User Schedule>  OLANZapine Injectable 5 milliGRAM(s) IntraMuscular every 8 hours PRN  ondansetron Injectable 4 milliGRAM(s) IV Push every 8 hours PRN  sodium chloride 0.9%. 1000 milliLiter(s) IV Continuous <Continuous>  valproate sodium   IVPB 1000 milliGRAM(s) IV Intermittent every 12 hours      HOME MEDICATIONS:  carBAMazepine 300 mg oral capsule, extended release: 2 cap(s) orally 3 times a day  divalproex sodium 500 mg oral delayed release tablet: 2 cap(s) orally 2 times a day  levOCARNitine 330 mg oral tablet: 2 tab(s) orally in the morning and at bedtime  levOCARNitine 330 mg oral tablet: 1 tab(s) orally once a day (in the afternoon)  LORazepam 1 mg oral tablet: 1 tab(s) orally once a day as needed for  breakthrough seizures can repeat if needed MDD: 2mg  melatonin 10 mg oral tablet: 1 tab(s) orally once a day (at bedtime)  metoprolol succinate 25 mg oral tablet, extended release: 1 tab(s) orally once a day  Nayzilam 5 mg/inh nasal spray: 1 spray(s) intranasally once a day as needed for  seizures lasting more than 3 minutes can repeat 10 minutes later if needed MDD: 10mg  olmesartan 20 mg oral tablet: 1 tab(s) orally once a day  Reclast 5 mg/100 mL intravenous solution: 5 milligram(s) intravenously yearly      PHYSICAL EXAM:    I/O Summary 24H    IN: 500 mL / OUT: 225 mL / NET: 275 mL    T(F): 99.4 (04-30-25 @ 20:40), Max: 99.4 (04-30-25 @ 20:40)  HR: 123 (04-30-25 @ 22:21) (85 - 126)  BP: 101/68 (04-30-25 @ 22:21) (94/60 - 161/78)  BP(mean): 105 (04-30-25 @ 03:15) (97 - 105)  ABP: --  ABP(mean): --  RR: 18 (04-30-25 @ 20:40) (18 - 20)  SpO2: 97% (04-30-25 @ 20:40) (95% - 98%)  CVP(mm Hg): --    GEN: Awake, comfortable. NAD.   HEENT: NCAT, PERRL, EOMI. Mucosa moist. No JVD.   RESP: CTA b/l  CV: RRR, normal s1/s2. No m/r/g.  ABD: Soft, NTND. BS+  EXT: Warm. No edema, clubbing, or cyanosis.   NEURO: AAOx3. No focal deficits.  	  LABS:	 	    Cardiac Markers     CBC 04-29-25 @ 07:00                        13.6   10.07 )-----------( 162                   41.3     Hgb trend: 13.6 <-- , 13.1 <--   WBC trend: 10.07 <-- , 5.48 <--     CMP 04-29-25 @ 07:00    141  |  104  |  22  ----------------------------<  81  4.4   |  26  |  0.64    Phos  3.2     04-29  Mg     2.3     04-29    TPro  6.8  /  Alb  3.7  /  TBili  0.2  /  DBili  x   /  AST  20  /  ALT  11  /  AlkPhos  35[L]     04-29    Serum Cr (eGFR) trend: 0.64 (90) <-- , 0.61 (91) <--

## 2025-04-30 NOTE — PROGRESS NOTE ADULT - PROBLEM SELECTOR PLAN 4
Hx of HTN    - holding metoprolol succinate 25mg qd while lethargic > converted to IV metoprolol tartrate 5mg twice daily while NPO  - holding olmesartan 20mg qd while lethargic

## 2025-04-30 NOTE — BH CONSULTATION LIAISON PROGRESS NOTE - NSBHATTESTBILLING_PSY_A_CORE
99223-Initial OBS or IP - high complexity OR  mins 28293-Yirpfvejoy OBS or IP - low complexity OR 25-34 mins

## 2025-04-30 NOTE — BH CONSULTATION LIAISON PROGRESS NOTE - NSBHFUPINTERVALCCFT_PSY_A_CORE
Pt was restless overnight, required Zyprexa 5 mg IM X 1 and Zyprexa 2.5mg IM X 2 overnight   Pt was restless overnight. Required Zyprexa 5 mg IM X 1 and Zyprexa 2.5mg IM X 2 overnight

## 2025-04-30 NOTE — PROGRESS NOTE ADULT - PROBLEM SELECTOR PLAN 5
Nutrition & Prophylaxis:    F: NS 100ml/hr while NPO  E:  K>4, Mg>2, Phos >3  N: NPO except meds  DVT PPx: SCDs & Lovenox 40mg QD  GI PPx: Not indicated  Dispo: 7 [facility]/EMU  Code: FULL CODE

## 2025-04-30 NOTE — PROGRESS NOTE ADULT - SUBJECTIVE AND OBJECTIVE BOX
EPILEPSY PROGRESS NOTE:  Patient seen and examined at bedside, and daughter at bedside states that she was restless overnight, and required several doses of zyprexa. Daughter is concerned that the zyprexa worsens her agitation. 1 report around 8:30am of potential seizure with blank stare.     REVIEW OF SYSTEMS:  Unobtainable 2/2 agitation and intermittently lethargic 2/2 medication (zyprexa and ativan)    MEDICATIONS  (STANDING):  carBAMazepine Chewable 450 milliGRAM(s) Chew <User Schedule>  enoxaparin Injectable 40 milliGRAM(s) SubCutaneous every 24 hours  lacosamide IVPB 100 milliGRAM(s) IV Intermittent every 12 hours  melatonin 5 milliGRAM(s) Oral at bedtime  metoprolol tartrate Injectable 2.5 milliGRAM(s) IV Push every 6 hours  sodium chloride 0.9%. 1000 milliLiter(s) (100 mL/Hr) IV Continuous <Continuous>  valproate sodium   IVPB 1000 milliGRAM(s) IV Intermittent every 12 hours    MEDICATIONS  (PRN):  bisacodyl 5 milliGRAM(s) Oral every 12 hours PRN Constipation  LORazepam   Injectable 2 milliGRAM(s) IV Push once PRN Seizure Activity  OLANZapine Injectable 5 milliGRAM(s) IntraMuscular every 8 hours PRN agitation  ondansetron Injectable 4 milliGRAM(s) IV Push every 8 hours PRN Nausea and/or Vomiting    VITAL SIGNS:  T(C): 36.6 (04-30-25 @ 03:15), Max: 37.2 (04-29-25 @ 18:05)  HR: 123 (04-30-25 @ 06:30) (89 - 123)  BP: 145/79 (04-30-25 @ 06:30) (93/59 - 145/79)  RR: 20 (04-30-25 @ 03:15) (16 - 20)  SpO2: 95% (04-30-25 @ 03:15) (95% - 97%)  Wt(kg): --    PHYSICAL EXAM:  Neurologic:     -Mental Status: Lethargic, disoriented. No speech output this morning.       -Cranial Nerves:  Limited 2/2 lethargy     -Motor: Moves all limbs spontaneoulsy and intermittently    LABS:  CBC Full  -  ( 29 Apr 2025 07:00 )  WBC Count : 10.07 K/uL  RBC Count : 4.20 M/uL  Hemoglobin : 13.6 g/dL  Hematocrit : 41.3 %  Platelet Count - Automated : 162 K/uL  Mean Cell Volume : 98.3 fl  Mean Cell Hemoglobin : 32.4 pg  Mean Cell Hemoglobin Concentration : 32.9 g/dL  Auto Neutrophil # : x  Auto Lymphocyte # : x  Auto Monocyte # : x  Auto Eosinophil # : x  Auto Basophil # : x  Auto Neutrophil % : x  Auto Lymphocyte % : x  Auto Monocyte % : x  Auto Eosinophil % : x  Auto Basophil % : x    04-29    141  |  104  |  22  ----------------------------<  81  4.4   |  26  |  0.64    Ca    9.1      29 Apr 2025 07:00  Phos  3.2     04-29  Mg     2.3     04-29    TPro  6.8  /  Alb  3.7  /  TBili  0.2  /  DBili  x   /  AST  20  /  ALT  11  /  AlkPhos  35[L]  04-29    LIVER FUNCTIONS - ( 29 Apr 2025 07:00 )  Alb: 3.7 g/dL / Pro: 6.8 g/dL / ALK PHOS: 35 U/L / ALT: 11 U/L / AST: 20 U/L / GGT: x           Carbamazepine Level, Serum: 8.7 ug/mL (04-28 @ 21:47)    EEG Daily Summary (4/30/25):    1)	Moderate generalized slowing suggestive of a similar degree of diffuse or multifocal  dysfunction.  2)	Frequent (1+/min < 1/10s)right> left temporal sharp wave discharges, somewhat improved  3)	 3 electrographic subclinical right temporal seizures were captured in the early afternoon, one of which had witnessed staring, last seizure occurring at 5:36 PM    Claudia Owens DO  Attending Neurologist, [facility]          Electronic Signatures:  Claudia Owens ()  (Signed 30-Apr-2025 08:55)  	Authored: EEG REPORT EPILEPSY PROGRESS NOTE:  Patient seen and examined at bedside, and daughter at bedside states that she was restless overnight, and required several doses of zyprexa. Daughter is concerned that the zyprexa worsens her agitation. 1 report around 8:30am of  blank stare did not correlate with seizure on EEG. Pt had 3 brief R temporal electrographic seizures yesterday last occurring at 5:30 pm. S/p ativan 1mg x 1 after sz cluster yesterday.    REVIEW OF SYSTEMS:  Unobtainable 2/2 agitation and intermittently lethargic 2/2 medication (zyprexa)    MEDICATIONS  (STANDING):  carBAMazepine Chewable 450 milliGRAM(s) Chew <User Schedule>  enoxaparin Injectable 40 milliGRAM(s) SubCutaneous every 24 hours  lacosamide IVPB 100 milliGRAM(s) IV Intermittent every 12 hours  melatonin 5 milliGRAM(s) Oral at bedtime  metoprolol tartrate Injectable 2.5 milliGRAM(s) IV Push every 6 hours  sodium chloride 0.9%. 1000 milliLiter(s) (100 mL/Hr) IV Continuous <Continuous>  valproate sodium   IVPB 1000 milliGRAM(s) IV Intermittent every 12 hours    MEDICATIONS  (PRN):  bisacodyl 5 milliGRAM(s) Oral every 12 hours PRN Constipation  LORazepam   Injectable 2 milliGRAM(s) IV Push once PRN Seizure Activity  OLANZapine Injectable 5 milliGRAM(s) IntraMuscular every 8 hours PRN agitation  ondansetron Injectable 4 milliGRAM(s) IV Push every 8 hours PRN Nausea and/or Vomiting    VITAL SIGNS:  T(C): 36.6 (04-30-25 @ 03:15), Max: 37.2 (04-29-25 @ 18:05)  HR: 123 (04-30-25 @ 06:30) (89 - 123)  BP: 145/79 (04-30-25 @ 06:30) (93/59 - 145/79)  RR: 20 (04-30-25 @ 03:15) (16 - 20)  SpO2: 95% (04-30-25 @ 03:15) (95% - 97%)  Wt(kg): --    PHYSICAL EXAM:  Neurologic:     -Mental Status: Lethargic, disoriented 2/2 just receiving zyprexa for trying to get out of bed.  No speech output this morning.       -Cranial Nerves:  Limited 2/2 lethargy     -Motor: Moves all limbs spontaneoulsy and intermittently    LABS:  CBC Full  -  ( 29 Apr 2025 07:00 )  WBC Count : 10.07 K/uL  RBC Count : 4.20 M/uL  Hemoglobin : 13.6 g/dL  Hematocrit : 41.3 %  Platelet Count - Automated : 162 K/uL  Mean Cell Volume : 98.3 fl  Mean Cell Hemoglobin : 32.4 pg  Mean Cell Hemoglobin Concentration : 32.9 g/dL  Auto Neutrophil # : x  Auto Lymphocyte # : x  Auto Monocyte # : x  Auto Eosinophil # : x  Auto Basophil # : x  Auto Neutrophil % : x  Auto Lymphocyte % : x  Auto Monocyte % : x  Auto Eosinophil % : x  Auto Basophil % : x    04-29    141  |  104  |  22  ----------------------------<  81  4.4   |  26  |  0.64    Ca    9.1      29 Apr 2025 07:00  Phos  3.2     04-29  Mg     2.3     04-29    TPro  6.8  /  Alb  3.7  /  TBili  0.2  /  DBili  x   /  AST  20  /  ALT  11  /  AlkPhos  35[L]  04-29    LIVER FUNCTIONS - ( 29 Apr 2025 07:00 )  Alb: 3.7 g/dL / Pro: 6.8 g/dL / ALK PHOS: 35 U/L / ALT: 11 U/L / AST: 20 U/L / GGT: x           Carbamazepine Level, Serum: 8.7 ug/mL (04-28 @ 21:47)    EEG Daily Summary (4/30/25):    1)	Moderate generalized slowing suggestive of a similar degree of diffuse or multifocal  dysfunction.  2)	Frequent (1+/min < 1/10s)right> left temporal sharp wave discharges, somewhat improved  3)	 3 electrographic subclinical right temporal seizures were captured in the early afternoon, one of which had witnessed staring, last seizure occurring at 5:36 PM    Claudia Owens DO  Attending Neurologist, [facility]          Electronic Signatures:  Claudia Owens)  (Signed 30-Apr-2025 08:55)  	Authored: EEG REPORT

## 2025-04-30 NOTE — BH CONSULTATION LIAISON PROGRESS NOTE - NSBHFUPINTERVALHXFT_PSY_A_CORE
Pt is currently sedated, occasionally wakes up, appearing restless, then goes back to sleep. As per daughter pt was up all night, restless. Has been sedated all day today.   We discussed psychopharmacological and environmental interventions. Pt might benefit from a trial of SSRI to address anxiety and agitation  Pt is currently sedated, occasionally wakes up, appearing restless, then goes back to sleep. As per daughter pt was up all night, restless. Has been sedated all day today. On a brief interview pt was able to recall she was in the hospital. However she was unable to sustain alertness.   We discussed psychopharmacological and environmental interventions with family. Pt might benefit from a trial of SSRI to address anxiety and agitation. We discuss the goal of stabilizing sleep wake cycle while minimizing agitation. Pt agrees to continue using Zyprexa for now. They would like to discuss initiating SSRI with Dr. Vo.

## 2025-04-30 NOTE — CONSULT NOTE ADULT - ASSESSMENT
Review of Studies:  EKG 4/30/2025 21:48:   EKG 4/30/2025 21:47:   EKG 4/30/2025 6:54 am:   EKG 4/29/2025: Sinus tachycardia, , RAD  EKG 4/28/2025: NSR HR 76 with PVCs, incomplete RBBB  TTE 12-: Normal LV size and contractility. Grade II DD. Severe enlargement of LA. Mildly calcified aortic valve with near normal motion and mild AI. Mitral annular calcification. Myxomatous leaflets with posterior leaflet prolapse and mild/moderate MR. RA enlarged. Mild moderate TR and mild pulmonary hypertension, PASP 46 mmHg.    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 Admitted to neurology service for further workup. Cardiology consulted for AT/Atrial flutter.     Review of Studies:  EKG 4/30/2025 21:48: ? flutter variable AV block,   EKG 4/30/2025 21:47: ? flutter with 2:1 conduction vs AT  EKG 4/30/2025 6:54 am: Regular, narrow complex rhythm difficult to discern p waves. HR 96  EKG 4/29/2025 17:56: Atrial fib vs flutter, HR 92  EKG 4/29/2025 04:26: Sinus tachycardia, , RAD  EKG 4/28/2025: NSR HR 76 with PVCs, incomplete RBBB  TTE 12-: Normal LV size and contractility. Grade II DD. Severe enlargement of LA. Mildly calcified aortic valve with near normal motion and mild AI. Mitral annular calcification. Myxomatous leaflets with posterior leaflet prolapse and mild/moderate MR. RA enlarged. Mild moderate TR and mild pulmonary hypertension, PASP 46 mmHg.    Home Medications: Metoprolol Succinate 25 mg QD, Olmesartan 20mg QD  Cardiologist: Dr. Cristofer Brownlee     # Atrial Tachycardia/Fib/Flutter  EKG with AT and 1st degree AVB first diagnosed in 2023. EKG at present appear similar vs aflutter.   Known history of AT for which she is on metoprolol succinate 25mg QD. Has been in an out of flutter since admission. Home Toprol was held in favor of IV lopressor 5mg q12 and later 2.5mg q6h which are not equivalent to her home dose of 25 qd. On examination patient is back in a regular, rate controlled sinus rhythm with HR ~ 70-80.   Bedside echo performed with preserved EF, appears similar to reports from previous echo in 2023.   - Since pt is now taking PO crushed meds with puree, would convert IV lopressor to PO metoprolol tartrate 25 mg BID for continued rate control and up-titrate as tolerated. Hold parameters for HR <60, SBP <90  - Can transfer to telemetry monitored unit in AM if continued fluctuation in HR  - CHADSVASC at least 4, would benefit from anticoagulation if no contraindication from a neurologic standpoint and after discussion with patient and family.   - Formal TTE    All recommendations preliminary until note co-signed.

## 2025-04-30 NOTE — PROGRESS NOTE ADULT - SUBJECTIVE AND OBJECTIVE BOX
Resting in bed.  Did not get much sleep last night.  Underwent disimpaction for significant constipation.     OVERNIGHT EVENTS: NAEO  Remaining ROS negative       PHYSICAL EXAM:    General: nad, lying in bed, eeg leads applied  HEENT: NC/AT; MMM  Cardiovascular: +S1/S2, RRR  Respiratory: CTA B/L; no W/R/R      VITAL SIGNS:  Vital Signs Last 24 Hrs  T(C): 36.4 (30 Apr 2025 13:05), Max: 37.2 (29 Apr 2025 18:05)  T(F): 97.6 (30 Apr 2025 13:05), Max: 98.9 (29 Apr 2025 18:05)  HR: 97 (30 Apr 2025 13:05) (89 - 123)  BP: 130/77 (30 Apr 2025 13:05) (93/59 - 161/78)  BP(mean): 105 (30 Apr 2025 03:15) (97 - 105)  RR: 18 (30 Apr 2025 13:05) (16 - 20)  SpO2: 97% (30 Apr 2025 13:05) (95% - 98%)    Parameters below as of 30 Apr 2025 13:05  Patient On (Oxygen Delivery Method): room air          MEDICATIONS:  MEDICATIONS  (STANDING):  carBAMazepine Chewable 450 milliGRAM(s) Chew <User Schedule>  enoxaparin Injectable 40 milliGRAM(s) SubCutaneous every 24 hours  lacosamide IVPB 100 milliGRAM(s) IV Intermittent every 12 hours  melatonin 5 milliGRAM(s) Oral at bedtime  metoprolol tartrate Injectable 2.5 milliGRAM(s) IV Push every 6 hours  sodium chloride 0.9%. 1000 milliLiter(s) (100 mL/Hr) IV Continuous <Continuous>  valproate sodium   IVPB 1000 milliGRAM(s) IV Intermittent every 12 hours    MEDICATIONS  (PRN):  bisacodyl 5 milliGRAM(s) Oral every 12 hours PRN Constipation  LORazepam   Injectable 2 milliGRAM(s) IV Push once PRN Seizure Activity  OLANZapine Injectable 5 milliGRAM(s) IntraMuscular every 8 hours PRN agitation  ondansetron Injectable 4 milliGRAM(s) IV Push every 8 hours PRN Nausea and/or Vomiting      ALLERGIES:  Allergies    Zonegran (Other)  Dilantin (Hives)  Bactrim (Unknown)  phenobarbital (Unknown)  MSG (Unknown)    Intolerances    antihistamines (Unknown)  Keppra (Unknown)  Fish Products (Unknown)  Neurontin (Unknown)  Topamax (Sedation/Somnol)  Trileptal (Nausea)  shellfish (Unknown)      LABS:                        13.6   10.07 )-----------( 162      ( 29 Apr 2025 07:00 )             41.3     04-29    141  |  104  |  22  ----------------------------<  81  4.4   |  26  |  0.64    Ca    9.1      29 Apr 2025 07:00  Phos  3.2     04-29  Mg     2.3     04-29    TPro  6.8  /  Alb  3.7  /  TBili  0.2  /  DBili  x   /  AST  20  /  ALT  11  /  AlkPhos  35[L]  04-29      Urinalysis Basic - ( 29 Apr 2025 07:00 )    Color: x / Appearance: x / SG: x / pH: x  Gluc: 81 mg/dL / Ketone: x  / Bili: x / Urobili: x   Blood: x / Protein: x / Nitrite: x   Leuk Esterase: x / RBC: x / WBC x   Sq Epi: x / Non Sq Epi: x / Bacteria: x      CAPILLARY BLOOD GLUCOSE      POCT Blood Glucose.: 120 mg/dL (28 Apr 2025 16:06)      RADIOLOGY & ADDITIONAL TESTS: Reviewed.

## 2025-04-30 NOTE — BH CONSULTATION LIAISON PROGRESS NOTE - NSBHCONSULTRECOMMENDOTHER_PSY_A_CORE FT
Recommendations  - restart melatonin 5mg QHS to promote sleep regulation  - consider Zyprexa 5mg IM Q4H for acute agitation (will re-assess when Seroquel 50mg PO may be appropriate)  - continue work-up per primary team; consider ammonia level  - non-medical interventions  	-orientation protocols: Provision of clocks, calendars, windows with outside views and frequent verbal reorientation of patients.  	-cognitive stimulation: Regular visits from family and friends. Avoid overstimulation (particularly at night)  	-facilitation of physiologic sleep: Nursing and medical procedures, including the administration of medications, should be avoided during sleeping when 		possible and night-time noise should be reduced.  	-early mobilization and minimized use of physical restraints for patients with limited mobility.   	-visual and hearing aids for patients with these impairments   Recommendations  - melatonin 5mg QHS to promote sleep regulation  - consider Zyprexa 5mg po/IM, please give at 8 pm.   - Zyprexa 2.5 mg po/IM bid prn agitation  -Consider a trial of SSRI (Lexapro 5 mg daily) as daughter reports anxiety and sundowning symptoms at baseline.   - continue work-up per primary team  - non-medical interventions  	-orientation protocols: Provision of clocks, calendars, windows with outside views and frequent verbal reorientation of patients.  	-cognitive stimulation: Regular visits from family and friends. Avoid overstimulation (particularly at night)  	-facilitation of physiologic sleep: Nursing and medical procedures, including the administration of medications, should be avoided during sleeping when 		possible and night-time noise should be reduced.  	-early mobilization and minimized use of physical restraints for patients with limited mobility.   	-visual and hearing aids for patients with these impairments

## 2025-04-30 NOTE — CONSULT NOTE ADULT - SUBJECTIVE AND OBJECTIVE BOX
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 GI consulted for nausea/vomiting.     Patient seen and examined at bedside with daughter present. Patient unable to answer ROS but daughter providing additional history.   States that patient does not typically suffer from nausea and vomiting, though her out-patient geriatrician was concerned her scoliosis could cause some problems with swallowing.   Over the weekend, developed sudden onset non-bilious non-bloody vomiting and was brought into the ER on Sunday.   Daughter describes that this was especially problematic because she was unable to then take her anti-epileptic medications.   Per daughter and H&P, patient had a generalized tonic clonic seizure 3-minute duration on 4/24 aborted with midazolam intranasal. However, since then she has had episodes of staring near daily.  Now admitted to [facility] and undergoing continuous vEEG monitoring.   Received one dose of Zofran on 04/29 but no other anti-emetic medications that day.   Underwent fecal disimpaction on 04/29/25.     CT abdomen/pelvis:   LOWER CHEST: Bibasilar linear atelectasis. Cardiomegaly. Coronary and mitral annular calcifications.  LIVER: 4 cm cyst in the left lobe. Few subcentimeter hypodensities in the right lobe are too small to characterize.  BILE DUCTS: Normal caliber.  GALLBLADDER: Within normal limits.  SPLEEN: Within normal limits.  PANCREAS: Within normal limits.  ADRENALS: Within normal limits.  KIDNEYS/URETERS: No hydronephrosis. Symmetric nephrograms. Several bilateral subcentimeter renal hypodensities are too small to characterize.  BLADDER: Within normal limits.  REPRODUCTIVE ORGANS: Uterus and adnexa within normal limits.  BOWEL: No bowel obstruction. Rectum is filled with multiple small stool balls. Moderate right colonic stool burden. Colonic diverticulosis without diverticulitis. Appendix is not visualized. No evidence of inflammation in the pericecal region.  PERITONEUM/RETROPERITONEUM: Within normal limits.  VESSELS: Atherosclerotic changes.  LYMPH NODES: No lymphadenopathy.  ABDOMINAL WALL: Within normal limits.  BONES: Degenerative changes. S-shaped thoracolumbar scoliosis. Probable   T9 vertebral body hemangioma.  IMPRESSION: No acute findings.    Most recent EGD:   - per daughter, is not sure when patient last had an EGD     Most recent colonoscopy:   - in out patient records had a colonoscopy with Dr. Hidalgo 11/10/2020   - six diminutive small semi-sessile and semi-pedunculated polyps were found in the cecum (1), ascending colon (3), and sigmoid colon (2)  - polyps removed by cold forceps and cold snare   - mild sigmoid diverticulosis   - small internal hemorrhoids   - recommended to undergo repeat colonoscopy in 3 years (2023)       VITAL SIGNS: Last 24 Hours  T(C): 36.5 (28 Apr 2025 23:07), Max: 37.1 (28 Apr 2025 22:25)  T(F): 97.7 (28 Apr 2025 23:07), Max: 98.7 (28 Apr 2025 22:25)  HR: 95 (28 Apr 2025 23:07) (75 - 95)  BP: 157/76 (28 Apr 2025 23:07) (100/57 - 157/76)  BP(mean): --  RR: 18 (28 Apr 2025 23:07) (18 - 18)  SpO2: 95% (28 Apr 2025 23:07) (95% - 98%)    Admitted to EMU (29 Apr 2025 03:20)    Allergies    Zonegran (Other)  Dilantin (Hives)  Bactrim (Unknown)  phenobarbital (Unknown)  MSG (Unknown)    Intolerances  antihistamines (Unknown)  Keppra (Unknown)  Fish Products (Unknown)  Neurontin (Unknown)  Topamax (Sedation/Somnol)  Trileptal (Nausea)  shellfish (Unknown)    Home Medications:  carBAMazepine 300 mg oral capsule, extended release: 2 cap(s) orally 3 times a day (29 Apr 2025 03:37)  divalproex sodium 500 mg oral delayed release tablet: 2 cap(s) orally 2 times a day (29 Apr 2025 03:30)  levOCARNitine 330 mg oral tablet: 2 tab(s) orally in the morning and at bedtime (29 Apr 2025 03:31)  levOCARNitine 330 mg oral tablet: 1 tab(s) orally once a day (in the afternoon) (29 Apr 2025 03:32)  LORazepam 1 mg oral tablet: 1 tab(s) orally once a day as needed for  breakthrough seizures can repeat if needed MDD: 2mg (29 Apr 2025 03:33)  melatonin 10 mg oral tablet: 1 tab(s) orally once a day (at bedtime) (29 Apr 2025 03:33)  metoprolol succinate 25 mg oral tablet, extended release: 1 tab(s) orally once a day (29 Apr 2025 03:34)  Nayzilam 5 mg/inh nasal spray: 1 spray(s) intranasally once a day as needed for  seizures lasting more than 3 minutes can repeat 10 minutes later if needed MDD: 10mg (29 Apr 2025 03:35)  olmesartan 20 mg oral tablet: 1 tab(s) orally once a day (29 Apr 2025 03:37)  Reclast 5 mg/100 mL intravenous solution: 5 milligram(s) intravenously yearly (29 Apr 2025 03:39)    MEDICATIONS:  MEDICATIONS  (STANDING):  carBAMazepine Chewable 450 milliGRAM(s) Chew <User Schedule>  enoxaparin Injectable 40 milliGRAM(s) SubCutaneous every 24 hours  lacosamide IVPB 150 milliGRAM(s) IV Intermittent every 12 hours  melatonin 5 milliGRAM(s) Oral at bedtime  metoprolol tartrate Injectable 2.5 milliGRAM(s) IV Push every 6 hours  OLANZapine Injectable 5 milliGRAM(s) IntraMuscular <User Schedule>  sodium chloride 0.9%. 1000 milliLiter(s) (100 mL/Hr) IV Continuous <Continuous>  valproate sodium   IVPB 1000 milliGRAM(s) IV Intermittent every 12 hours    MEDICATIONS  (PRN):  bisacodyl 5 milliGRAM(s) Oral every 12 hours PRN Constipation  LORazepam   Injectable 2 milliGRAM(s) IV Push once PRN Seizure Activity  OLANZapine Injectable 2.5 milliGRAM(s) IntraMuscular every 12 hours PRN Agitation  OLANZapine Injectable 5 milliGRAM(s) IntraMuscular every 8 hours PRN agitation  ondansetron Injectable 4 milliGRAM(s) IV Push every 8 hours PRN Nausea and/or Vomiting    PAST MEDICAL & SURGICAL HISTORY:  Breast cancer, stage 1      History of brain tumor      Epilepsy      HTN (hypertension)      S/P breast lumpectomy      H/O excision of tumor of brain meninges        FAMILY HISTORY:  No pertinent family history in first degree relatives      SOCIAL HISTORY:  [place]: [ ] Current, [ ] Former, [ ] Never; Pack Years:  Alcohol:  Illicit Drugs:      Vital Signs Last 24 Hrs  T(C): 36.4 (30 Apr 2025 13:05), Max: 37.2 (29 Apr 2025 18:05)  T(F): 97.6 (30 Apr 2025 13:05), Max: 98.9 (29 Apr 2025 18:05)  HR: 97 (30 Apr 2025 13:05) (89 - 123)  BP: 130/77 (30 Apr 2025 13:05) (93/59 - 161/78)  BP(mean): 105 (30 Apr 2025 03:15) (97 - 105)  RR: 18 (30 Apr 2025 13:05) (16 - 20)  SpO2: 97% (30 Apr 2025 13:05) (95% - 98%)    Parameters below as of 30 Apr 2025 13:05  Patient On (Oxygen Delivery Method): room air        04-29 @ 07:01  -  04-30 @ 07:00  --------------------------------------------------------  IN: 500 mL / OUT: 225 mL / NET: 275 mL      PHYSICAL EXAM:  General: No acute distress, on vEEG monitoring  Lungs: Normal respiratory effort and no intercostal retractions  Cardiovascular: RRR  Abdomen: Soft, non-tender, non-distended  Neurological: lethargic but conversant   Skin: Warm and dry. No obvious rash        LABS:             13.6   10.07 )-----------( 162      ( 29 Apr 2025 07:00 )             41.3     04-29    141  |  104  |  22  ----------------------------<  81  4.4   |  26  |  0.64    Ca    9.1      29 Apr 2025 07:00  Phos  3.2     04-29  Mg     2.3     04-29    TPro  6.8  /  Alb  3.7  /  TBili  0.2  /  DBili  x   /  AST  20  /  ALT  11  /  AlkPhos  35[L]  04-29            Urinalysis with Rflx Culture (collected 28 Apr 2025 21:41)      RADIOLOGY & ADDITIONAL STUDIES:     Reviewed

## 2025-04-30 NOTE — EEG REPORT - NS EEG TEXT BOX
[facility] Department of Neurology  Epilepsy Monitoring Unit video-Electroencephalogram    Patient Name:	SUKHJINDER DIEGO    :	1945  MRN:	[id number]    Study Start Date/Time:  2025, 12:14:52 AM  Study End Date/Time: in progress    Referred by: Dr. Souhel Najjar    Brief Clinical History:  SUKHJINDER DIEGO is a 80 year old Female with epilepsy, nausea and vomiting; study performed to investigate for seizures or markers of epilepsy.  Diagnosis Code:   R56.9 convulsions/seizure    Pertinent Medications:  Carbamazepine, Depakote (IV Vimpat given in lieu of Carbamazepine due to vomiting)    Acquisition Details:  Electroencephalography was acquired using a minimum of 21 channels on an [employer] system v 9.3.1 with electrode placement according to the standard International 10-20 system following ACNS (American Clinical Neurophysiology Society) guidelines for Long-Term Video EEG monitoring.  Anterior temporal T1 and T2 electrodes were utilized whenever possible.   The [employer] automated spike & seizure detections were all reviewed in detail, in addition to extensive portions of raw EEG.  The live video was monitored continuously by trained technicians to identify events and specialty nurses trained in seizure management supervised the care of the patient in the epilepsy unit.      Daily Updates (from 07:00 am until 07:00 am):  Day 2  -2025  Background:  continuous, with predominantly theta>delta.frequencies.  Symmetry:  No persistent asymmetries of voltage or frequency.  Posterior Dominant Rhythm:  No clear PDR   Organization: Rudimentary.  Voltage:  Normal (20+ uV)  Variability: Yes. 		Reactivity: Yes.  N2 sleep: Symmetric, synchronous spindles and K complexes.  Spontaneous Activity:  Frequent (1+/min < 1/10s) right> left temporal sharp wave discharges, slightly improved.  Periodic/rhythmic activity:  None.  Events:  3 electrographic subclinical right temporal seizures were captured in the early afternoon and further described below:  d1 13:33:58		xR Sz # 1 onset in sleep/subclinical: rhythmic delta, right temporal area  d1 13:34:01		x R temporal rhythmic  theta> R frontal area  d1 13:34:25		xsharply contoured R temporal area now  d1 13:34:31		xrhythmic delta R T>F areas, blunted  d1 13:34:39		x R T>F 1.5-2 hz rhythmic spike and wave activity  d1 13:35:00		xR temporal theta spikes  d1 13:35:08		xoffset/ gen attenuation    d1 15:39:19		x R temp sz #2 in sleep  d1 15:41:06		xmoves her R arm, non purposefully  d1 15:41:22		xoffset/ gen slow    d1 17:36:00		xR temporal sz #3 , bigger  d1 17:37:03		Patient Event  d1 17:37:11		Patient Event  d1 17:37:20		xappears to be breathing faster, NP at bedside said patient was staring  d1 17:38:18		xoffset/ gen slow    Provocations:  Hyperventilation and Photic stimulation: was not performed.    Daily Summary:    1)	Moderate generalized slowing suggestive of a similar degree of diffuse or multifocal  dysfunction.  2)	Frequent (1+/min < 1/10s)right> left temporal sharp wave discharges, somewhat improved  3)	 3 electrographic subclinical right temporal seizures were captured in the early afternoon, one of which had witnessed staring, last seizure occurring at 5:36 PM    Claudia Owens DO  Attending Neurologist, [facility] Epilepsy Program

## 2025-04-30 NOTE — PROGRESS NOTE ADULT - ASSESSMENT
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 Patient had 3 electrographic seizures that correlates with her staring spells.  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 Patient had 3 electrographic seizures yesterday, one which correlated with her staring spells witnessed by daughters at home. Last seizure was 5:36 pm on 4/29/25

## 2025-04-30 NOTE — CONSULT NOTE ADULT - TIME BILLING
direct patient care  (interview and examination of patient), discussion with other providers, support staff and/or patient's family members, review of medical records, ordering diagnostic tests and analyzing results, and documentation.

## 2025-04-30 NOTE — PROGRESS NOTE ADULT - PROBLEM SELECTOR PLAN 1
Hx of seizures since 2003 following R temporal & frontal lobes resection. Admitted for uncontrolled nausea and vomiting and new staring spell episodes that has been identified as seizure events on vEEG monitoring. 3 seizures have been captured so far for which 1mg ativan IV has been given.     - Continue vEEG monitoring BID  - 4/29 Reduced Carbamazepine dose from 600mg TID to 450mg BID   - 4/30 Unable to administer lower dose of carbamazepine d/t lethargy and unable to take PO  - Continue Depakote 1000mg IV  - Continue Vimpat 100mg BID IV  - AED levels: Carbamazepine 8.7 and VPA 72.7  on 4/28/25  - Ativan 2mg IVP PRN for seizures > 2mins  - Neurological & Vitals assessment Q8hrs  - Maintain Seizure/Fall/Aspiration precautions. Hx of seizures since 2003 following R temporal & frontal lobes resection. Admitted for uncontrolled nausea and vomiting and new staring spell episodes that has been identified as seizure events on vEEG monitoring. 3 seizures have been captured so far for which 1mg ativan IV has been given. Last sz was yesterday 5:36pm.     - Continue vEEG monitoring BID  - 4/29 Reduced Carbamazepine dose from 600mg TID to 450mg BID PO as per Dr. Najjar plans for eventual wean off.   - 4/30 Unable to administer lower dose of carbamazepine AM d/t lethargy from sedative med  - Continue Depakote 1000mg IV  - Continue Vimpat and inc to 150mg BID due to patient's poor cooperation with taking oral carabamazepine.  - AED levels: Carbamazepine 8.7 and VPA 72.7  on 4/28/25  - Ativan 2mg IVP PRN for seizures > 2mins  - Neurological & Vitals assessment Q8hrs  - Maintain Seizure/Fall/Aspiration precautions.

## 2025-04-30 NOTE — CONSULT NOTE ADULT - CONSULT REASON
Hx of Seizures, presenting with N/V and unable to tolerate PO AEDs
Nausea/vomiting
Atrial tachycardia
PM&R
comanagement

## 2025-04-30 NOTE — CONSULT NOTE ADULT - ASSESSMENT
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 GI consulted for nausea/vomiting.     Nausea possibly 2/2 to central cause (in setting of active seizures) vs. gastroenteritis given sudden onset vs. motility related issue (moderate stool burden noted on CT abd/pelvis) vs. gastritis (will start PPI trial).   Less likely due to mechanical or obstructive cause given no major abnormalities noted on CT abdomen/pelvis.     Most recent EGD:   - per daughter, is not sure when patient last had an EGD     Most recent colonoscopy:   - in out patient records had a colonoscopy with Dr. Hidalgo 11/10/2020   - six diminutive small semi-sessile and semi-pedunculated polyps were found in the cecum (1), ascending colon (3), and sigmoid colon (2)  - polyps removed by cold forceps and cold snare   - mild sigmoid diverticulosis   - small internal hemorrhoids   - recommended to undergo repeat colonoscopy in 3 years (2023)     Recommendations:   - start pantoprazole 40 mg IV daily   - continue Zofran 4 mg IV q8 for nausea/vomiting (monitor QTC while on Zofran)   - agree with manual disimpaction and rectal suppositories as needed   - start Miralax 17 g PO BID to treat constipation    - was due for repeat colonoscopy in 2023 per Dr. Hidalgo's procedure note based on risks/benefits in the setting of overall comorbidities; can consider role of repeat as out-patient     Case discussed with Dr. Malik. GI Team will continue to follow.     Brenda Waddell D.O.   Gastroenterology Fellow  Weekday 7am-5pm Pager: [phone]  Weeknights/Weekend/Holiday Coverage: Please call the  for contact info.

## 2025-04-30 NOTE — CONSULT NOTE ADULT - ATTENDING COMMENTS
80F w/ hx of brain tumor s/p resection 2003 c/b recurrent seizures, Alzheimer's dementia. Worsening mental status in setting of nausea/vomiting interfering w/ ability to take medications. Being evaluated for subclinical seizure activity.     D/dx is broad and includes infectious etiology (less likely), gastroparesis or partial obstruction, or more likely non-GI causes such as central etiology related to seizures or 2/2 AEDs other medications. CT is wnl, making obstructing lesion very unlikely.     For now would tx symptomatically with antiemetics as outlined above. PPI will be helpful as well. Tx constipation w/ enemas and MiraLAX.   If/when able, would start GI evaluation with XR esophagram and upper GI series. If pt improves w/ conservative management and is otherwise ready for discharge, GI eval can be done as outpatient.     GI will follow.

## 2025-04-30 NOTE — PROGRESS NOTE ADULT - ASSESSMENT
80F with PMH of brain tumor resection in 2003, alzheimer's dementia, seizures, breast cancer and hypertension, admitted to the epilepsy service with nausea, vomiting and worsening cognition with increased frequency of staring spell and overall decline.     #Seizures  #Dementia  - plan per epilepsy team  - currently on vEEG   - carbamazepine 450mg BID  - vimpat 100mg IV every 12 hours  - depakote 1000mg every 12 hours    #Nausea, vomiting, constipation  - large stool burden without obstruction seen on abdominal CT obtained in the ER  - stool seen in rectum as well - recommend suppository and/or enema  - per discussion with epilepsy NP, team planning on consulted GI - appreciate recommendations  - s/p disimpaction yesterday     #HTN  - can continue home antihypertensive regimen, with holding parameters    35 minutes spent on this encounter, including face to face with patient, review of chart, care coordination and documentation.  Plan discussed with epilepsy team.

## 2025-04-30 NOTE — PROGRESS NOTE ADULT - PROBLEM SELECTOR PLAN 3
Hx of Alheimer's now agitated, and restless.     - trialed IM zyprexa 2.5mg in ED  - reoder home melatonin but 5mg instead of 10mg nightly as per Psych recs  - Psychiatry consult requested for assistance with agitation/ delirium management: recommended re-orientation, melatonin 5mg QHS, obtain ammonia (41) and VPA (72.7) levels - normal results as well as zyprex 5mg Q4hrs PRN or seroquel 50mg PO when tolerating. Hx of Alheimer's now agitated, and restless.     - trialed IM zyprexa 2.5mg in ED  - reoder home melatonin but 5mg instead of 10mg nightly as per Psych recs  - Psychiatry consult appreciated.  -* reviewed regimen with Dr. Najjar who prefers standing zyprexa 5mg QHS at 6-7pm,  along with 2.5mg PRN agitation (up to 2 x in daytime).

## 2025-05-01 ENCOUNTER — RESULT REVIEW (OUTPATIENT)
Age: 80
End: 2025-05-01

## 2025-05-01 DIAGNOSIS — I48.0 PAROXYSMAL ATRIAL FIBRILLATION: ICD-10-CM

## 2025-05-01 DIAGNOSIS — R26.81 UNSTEADINESS ON FEET: ICD-10-CM

## 2025-05-01 DIAGNOSIS — I48.91 UNSPECIFIED ATRIAL FIBRILLATION: ICD-10-CM

## 2025-05-01 PROCEDURE — 99231 SBSQ HOSP IP/OBS SF/LOW 25: CPT

## 2025-05-01 PROCEDURE — 99291 CRITICAL CARE FIRST HOUR: CPT

## 2025-05-01 PROCEDURE — 99233 SBSQ HOSP IP/OBS HIGH 50: CPT

## 2025-05-01 PROCEDURE — 93306 TTE W/DOPPLER COMPLETE: CPT | Mod: 26

## 2025-05-01 PROCEDURE — 99232 SBSQ HOSP IP/OBS MODERATE 35: CPT

## 2025-05-01 PROCEDURE — 95720 EEG PHY/QHP EA INCR W/VEEG: CPT

## 2025-05-01 RX ORDER — METOPROLOL SUCCINATE 50 MG/1
12.5 TABLET, EXTENDED RELEASE ORAL ONCE
Refills: 0 | Status: COMPLETED | OUTPATIENT
Start: 2025-05-01 | End: 2025-05-01

## 2025-05-01 RX ORDER — ACETAMINOPHEN 500 MG/5ML
650 LIQUID (ML) ORAL EVERY 6 HOURS
Refills: 0 | Status: DISCONTINUED | OUTPATIENT
Start: 2025-05-01 | End: 2025-05-04

## 2025-05-01 RX ORDER — BISACODYL 5 MG
10 TABLET, DELAYED RELEASE (ENTERIC COATED) ORAL DAILY
Refills: 0 | Status: DISCONTINUED | OUTPATIENT
Start: 2025-05-01 | End: 2025-05-04

## 2025-05-01 RX ORDER — LACOSAMIDE 150 MG/1
100 TABLET, FILM COATED ORAL
Refills: 0 | Status: DISCONTINUED | OUTPATIENT
Start: 2025-05-01 | End: 2025-05-02

## 2025-05-01 RX ORDER — METOPROLOL SUCCINATE 50 MG/1
25 TABLET, EXTENDED RELEASE ORAL
Refills: 0 | Status: DISCONTINUED | OUTPATIENT
Start: 2025-05-01 | End: 2025-05-01

## 2025-05-01 RX ORDER — BENZOCAINE 220 MG/G
1 SPRAY, METERED PERIODONTAL
Refills: 0 | Status: DISCONTINUED | OUTPATIENT
Start: 2025-05-01 | End: 2025-05-04

## 2025-05-01 RX ORDER — ONDANSETRON HCL/PF 4 MG/2 ML
4 VIAL (ML) INJECTION EVERY 8 HOURS
Refills: 0 | Status: DISCONTINUED | OUTPATIENT
Start: 2025-05-01 | End: 2025-05-01

## 2025-05-01 RX ORDER — METOPROLOL SUCCINATE 50 MG/1
37.5 TABLET, EXTENDED RELEASE ORAL EVERY 12 HOURS
Refills: 0 | Status: DISCONTINUED | OUTPATIENT
Start: 2025-05-01 | End: 2025-05-02

## 2025-05-01 RX ORDER — METOPROLOL SUCCINATE 50 MG/1
37.5 TABLET, EXTENDED RELEASE ORAL
Refills: 0 | Status: DISCONTINUED | OUTPATIENT
Start: 2025-05-01 | End: 2025-05-01

## 2025-05-01 RX ORDER — OLANZAPINE 10 MG/1
2.5 TABLET ORAL ONCE
Refills: 0 | Status: DISCONTINUED | OUTPATIENT
Start: 2025-05-01 | End: 2025-05-01

## 2025-05-01 RX ORDER — APIXABAN 2.5 MG/1
5 TABLET, FILM COATED ORAL EVERY 12 HOURS
Refills: 0 | Status: DISCONTINUED | OUTPATIENT
Start: 2025-05-01 | End: 2025-05-04

## 2025-05-01 RX ORDER — OLANZAPINE 10 MG/1
5 TABLET ORAL EVERY 24 HOURS
Refills: 0 | Status: DISCONTINUED | OUTPATIENT
Start: 2025-05-01 | End: 2025-05-04

## 2025-05-01 RX ORDER — METOPROLOL SUCCINATE 50 MG/1
2.5 TABLET, EXTENDED RELEASE ORAL ONCE
Refills: 0 | Status: COMPLETED | OUTPATIENT
Start: 2025-05-01 | End: 2025-05-01

## 2025-05-01 RX ORDER — LOSARTAN POTASSIUM 100 MG/1
50 TABLET, FILM COATED ORAL DAILY
Refills: 0 | Status: DISCONTINUED | OUTPATIENT
Start: 2025-05-01 | End: 2025-05-03

## 2025-05-01 RX ORDER — POLYETHYLENE GLYCOL 3350 17 G/17G
17 POWDER, FOR SOLUTION ORAL
Refills: 0 | Status: DISCONTINUED | OUTPATIENT
Start: 2025-05-01 | End: 2025-05-04

## 2025-05-01 RX ADMIN — Medication 650 MILLIGRAM(S): at 11:08

## 2025-05-01 RX ADMIN — ESCITALOPRAM OXALATE 5 MILLIGRAM(S): 20 TABLET ORAL at 08:50

## 2025-05-01 RX ADMIN — Medication 1 APPLICATION(S): at 22:20

## 2025-05-01 RX ADMIN — METOPROLOL SUCCINATE 37.5 MILLIGRAM(S): 50 TABLET, EXTENDED RELEASE ORAL at 22:02

## 2025-05-01 RX ADMIN — LACOSAMIDE 120 MILLIGRAM(S): 150 TABLET, FILM COATED ORAL at 10:43

## 2025-05-01 RX ADMIN — OLANZAPINE 5 MILLIGRAM(S): 10 TABLET ORAL at 21:58

## 2025-05-01 RX ADMIN — Medication 100 MILLILITER(S): at 13:43

## 2025-05-01 RX ADMIN — POLYETHYLENE GLYCOL 3350 17 GRAM(S): 17 POWDER, FOR SOLUTION ORAL at 17:55

## 2025-05-01 RX ADMIN — Medication 60 MILLIGRAM(S): at 00:32

## 2025-05-01 RX ADMIN — CARBAMAZEPINE 450 MILLIGRAM(S): 200 TABLET ORAL at 22:03

## 2025-05-01 RX ADMIN — Medication 650 MILLIGRAM(S): at 17:54

## 2025-05-01 RX ADMIN — Medication 100 MILLILITER(S): at 03:35

## 2025-05-01 RX ADMIN — METOPROLOL SUCCINATE 25 MILLIGRAM(S): 50 TABLET, EXTENDED RELEASE ORAL at 02:08

## 2025-05-01 RX ADMIN — CARBAMAZEPINE 450 MILLIGRAM(S): 200 TABLET ORAL at 09:22

## 2025-05-01 RX ADMIN — BENZOCAINE 1 SPRAY(S): 220 SPRAY, METERED PERIODONTAL at 10:08

## 2025-05-01 RX ADMIN — Medication 5 MILLIGRAM(S): at 22:03

## 2025-05-01 RX ADMIN — APIXABAN 5 MILLIGRAM(S): 2.5 TABLET, FILM COATED ORAL at 17:54

## 2025-05-01 RX ADMIN — ENOXAPARIN SODIUM 40 MILLIGRAM(S): 100 INJECTION SUBCUTANEOUS at 09:24

## 2025-05-01 RX ADMIN — Medication 650 MILLIGRAM(S): at 10:09

## 2025-05-01 RX ADMIN — Medication 40 MILLIGRAM(S): at 09:24

## 2025-05-01 RX ADMIN — Medication 60 MILLIGRAM(S): at 13:43

## 2025-05-01 RX ADMIN — METOPROLOL SUCCINATE 2.5 MILLIGRAM(S): 50 TABLET, EXTENDED RELEASE ORAL at 08:51

## 2025-05-01 RX ADMIN — Medication 650 MILLIGRAM(S): at 18:40

## 2025-05-01 RX ADMIN — LACOSAMIDE 120 MILLIGRAM(S): 150 TABLET, FILM COATED ORAL at 22:49

## 2025-05-01 RX ADMIN — LOSARTAN POTASSIUM 50 MILLIGRAM(S): 100 TABLET, FILM COATED ORAL at 17:54

## 2025-05-01 RX ADMIN — Medication 1 APPLICATION(S): at 11:15

## 2025-05-01 NOTE — PROGRESS NOTE ADULT - ASSESSMENT
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 Admitted to neurology service for further workup. Cardiology consulted for AT/Atrial flutter.     Review of Studies:  ECG 5/1/25: AF   EKG 4/30/2025 21:48: AFL w/ variable block   EKG 4/30/2025 21:47: A flutter with 2:1 conduction vs AT  EKG 4/30/2025 6:54 am: Regular, narrow complex rhythm difficult to discern p waves. HR 96  EKG 4/29/2025 17:56: Atrial fib vs flutter, HR 92  EKG 4/29/2025 04:26: Sinus tachycardia, , RAD  EKG 4/28/2025: NSR HR 76 with PVCs, incomplete RBBB  TTE 12-: Normal LV size and contractility. Grade II DD. Severe enlargement of LA. Mildly calcified aortic valve with near normal motion and mild AI. Mitral annular calcification. Myxomatous leaflets with posterior leaflet prolapse and mild/moderate MR. RA enlarged. Mild moderate TR and mild pulmonary hypertension, PASP 46 mmHg.    Home Medications: Metoprolol Succinate 25 mg QD, Olmesartan 20mg QD  Cardiologist: Dr. Cristofer Brownlee     #pAF/AFl  EKG with AT and 1st degree AVB first diagnosed in 2023. ECGs now showing AFL/AF. Known history of AT for which she is on metoprolol succinate 25mg QD. Has been in an out of flutter since admission. Home Toprol was held in favor of IV lopressor 5mg q12 and later 2.5mg q6h. During RVR episode in early AM of 5/1 25mg PO Lopressor given with rate now more in the 90s-110s. CHADSVASC 4-5  - Recommend increasing Lopressor to 37.5mg  BID PO, with uptitration to 50mg BID PO if needed   - Recommend starting Eliquis 5mg BID for AC  - Not a candidate for DCCV right now given paroxysmal nature and lack of AC prior, if rates becomes more difficult to control despite escalating PO regimen will consider WILBER/DCCV        #HTN  Takes olmesartan 20mg QD  - Can c/w with strict holding parameters sBP <100       Recommendations above are preliminary pending discussion with an attending cardiologist  We'll continue to follow, thank you for the consultation      Don Ulloa ([id number])  Cardiovascular Disease Fellow               81 yo F with PMHx of HTN, atrial tachycardia, 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 Admitted to neurology service for further workup. Cardiology consulted for AT/Atrial flutter.     Review of Studies:  ECG 5/1/25: AF   EKG 4/30/2025 21:48: AFL w/ variable block   EKG 4/30/2025 21:47: A flutter with 2:1 conduction vs AT  EKG 4/30/2025 6:54 am: Regular, narrow complex rhythm difficult to discern p waves. HR 96  EKG 4/29/2025 17:56: Atrial fib vs flutter, HR 92  EKG 4/29/2025 04:26: Sinus tachycardia, , RAD  EKG 4/28/2025: NSR HR 76 with PVCs, incomplete RBBB  TTE 12-: Normal LV size and contractility. Grade II DD. Severe enlargement of LA. Mildly calcified aortic valve with near normal motion and mild AI. Mitral annular calcification. Myxomatous leaflets with posterior leaflet prolapse and mild/moderate MR. RA enlarged. Mild moderate TR and mild pulmonary hypertension, PASP 46 mmHg.    Home Medications: Metoprolol Succinate 25 mg QD, Olmesartan 20mg QD  Cardiologist: Dr. Cristofer Brownlee     #pAF/AFl  EKG with AT and 1st degree AVB first diagnosed in 2023. ECGs now showing AFL/AF. Known history of AT for which she is on metoprolol succinate 25mg QD. Has been in an out of flutter since admission. Home Toprol was held in favor of IV lopressor 5mg q12 and later 2.5mg q6h. During RVR episode in early AM of 5/1 25mg PO Lopressor given with rate now more in the 90s-110s. CHADSVASC 4-5  - Recommend increasing Lopressor to 37.5mg  BID PO, with uptitration to 50mg BID PO if needed   - Recommend starting Eliquis 5mg BID for AC  - Not a candidate for DCCV right now given paroxysmal nature and lack of AC prior, if rates becomes more difficult to control despite escalating PO regimen will consider WILBER/DCCV        #HTN  Takes olmesartan 20mg QD  - Can c/w with strict holding parameters sBP <100       Recommendations above discussed with an attending cardiologist  We'll continue to follow, thank you for the consultation      Don Ulloa ([id number])  Cardiovascular Disease Fellow

## 2025-05-01 NOTE — PROGRESS NOTE ADULT - SUBJECTIVE AND OBJECTIVE BOX
SUKHJINDER DIEGO  80y  Female        INTERVAL HPI/OVERNIGHT EVENTS: HR in to the 130s o/n, got Lopressor 25mg PO x1 with rates now in the 90s-110s primarily       T(C): 36.9 (05-01-25 @ 10:13), Max: 37.4 (04-30-25 @ 20:40)  HR: 107 (05-01-25 @ 11:26) (73 - 126)  BP: 105/67 (05-01-25 @ 11:26) (73/51 - 150/85)  RR: 17 (05-01-25 @ 11:26) (17 - 21)  SpO2: 97% (05-01-25 @ 11:26) (91% - 98%)  Wt(kg): --Vital Signs Last 24 Hrs  T(C): 36.9 (01 May 2025 10:13), Max: 37.4 (30 Apr 2025 20:40)  T(F): 98.5 (01 May 2025 10:13), Max: 99.4 (30 Apr 2025 20:40)  HR: 107 (01 May 2025 11:26) (73 - 126)  BP: 105/67 (01 May 2025 11:26) (73/51 - 150/85)  BP(mean): 81 (01 May 2025 11:26) (58 - 109)  RR: 17 (01 May 2025 11:26) (17 - 21)  SpO2: 97% (01 May 2025 11:26) (91% - 98%)    Parameters below as of 01 May 2025 11:26  Patient On (Oxygen Delivery Method): room air        PHYSICAL EXAM:  GENERAL: NAD  CHEST/LUNG: Clear to auscultation bilaterally; No rales, rhonchi, wheezing, or rubs  HEART: Irregular rate and rhythm; No murmurs, rubs, or gallops  ABDOMEN: Soft, Nontender, Nondistended; Bowel sounds present  EXTREMITIES:  WWP, No edema  NEURO: AOx2     Consultant(s) Notes Reviewed:  [x ] YES  [ ] NO  Care Discussed with Consultants/Other Providers [ x] YES  [ ] NO    LABS:                CAPILLARY BLOOD GLUCOSE                RADIOLOGY & ADDITIONAL TESTS:    Imaging Personally Reviewed:  [ ] YES  [ ] NO    HEALTH ISSUES - PROBLEM Dx:  Seizure    Prophylactic measure    Nausea & vomiting    Alzheimer dementia with agitation    Hypertension    Rapid atrial fibrillation

## 2025-05-01 NOTE — PROGRESS NOTE ADULT - PROBLEM SELECTOR PLAN 4
Seen by psych inpatient for increased agitation with recommendations below  -melatonin 5 mg qHS  -zyprexa 5 mg PO qHS  -zyprexa 2.5 mg PRN for agitation  -can consider trial SSRI  -continue to verbally redirect and reorient patient Seen by psych inpatient for increased agitation with recommendations below  -melatonin 5 mg qHS  -zyprexa 5 mg PO qHS  -zyprexa 2.5 mg PRN for agitation  -lexapro 5 mg daily  -continue to verbally redirect and reorient patient

## 2025-05-01 NOTE — BH CONSULTATION LIAISON PROGRESS NOTE - NSBHASSESSMENTFT_PSY_ALL_CORE
80F with PMH of brain tumor resection in 2003, alzheimer's dementia, seizures, breast cancer and hypertension, admitted to the epilepsy service with nausea, vomiting and worsening cognition with increased frequency of staring spell and overall decline.  Pt with intermittent agitation, insomnia, recent seizure activity. Current presentation is consistent with delirium in context of underlying neurocognitive disorder.   On exam today pt is alert, responding to questions appropriately. Demonstrates memory impairment. However there was no evidence of agitation. Pt with likely resolving delirium. Medication options to address sundowning at home were discussed with pt and daughter at bedside.     Pt might benefit from a trial of SSRI (i.e Lexapro 5 mg daily) to address anxiety/agitation  For acute agitation consider Zyprexa 2.5 - 5 mg qhs prn   Delirium precautions  Re-consult prn       
80F with PMH of brain tumor resection in 2003, alzheimer's dementia, seizures, breast cancer and hypertension, admitted to the epilepsy service with nausea, vomiting and worsening cognition with increased frequency of staring spell and overall decline.  Pt with intermittent agitation, insomnia, recent seizure activity. Current presentation is consistent with delirium in context of underlying neurocognitive disorder.     See below for recommendations.

## 2025-05-01 NOTE — PROGRESS NOTE ADULT - SUBJECTIVE AND OBJECTIVE BOX
Feeling much better today.  had bowel movements.  Daughter at bedside, stating that her mom has been eating apple sauce and drinking fluids, without any nausea or vomiting.    OVERNIGHT EVENTS: NAEO    Remaining ROS negative       PHYSICAL EXAM:    General: nad, lying in bed, eeg leads applied  HEENT: NC/AT; MMM  Cardiovascular: +S1/S2, tachycardic  Respiratory: CTA B/L; no W/R/R  Ab: soft, ntnd  ext: wwp    VITAL SIGNS:  Vital Signs Last 24 Hrs  T(C): 36.3 (01 May 2025 05:24), Max: 37.4 (30 Apr 2025 20:40)  T(F): 97.4 (01 May 2025 05:24), Max: 99.4 (30 Apr 2025 20:40)  HR: 118 (01 May 2025 08:24) (73 - 126)  BP: 141/80 (01 May 2025 08:24) (94/60 - 161/78)  BP(mean): 105 (01 May 2025 08:24) (99 - 109)  RR: 21 (01 May 2025 08:24) (18 - 21)  SpO2: 95% (01 May 2025 08:24) (91% - 98%)    Parameters below as of 01 May 2025 08:24  Patient On (Oxygen Delivery Method): room air          MEDICATIONS:  MEDICATIONS  (STANDING):  bacitracin   Ointment 1 Application(s) Topical three times a day  carBAMazepine Chewable 450 milliGRAM(s) Chew <User Schedule>  enoxaparin Injectable 40 milliGRAM(s) SubCutaneous every 24 hours  escitalopram 5 milliGRAM(s) Oral <User Schedule>  lacosamide IVPB 100 milliGRAM(s) IV Intermittent <User Schedule>  melatonin 5 milliGRAM(s) Oral at bedtime  metoprolol tartrate 25 milliGRAM(s) Oral two times a day  OLANZapine Injectable 5 milliGRAM(s) IntraMuscular <User Schedule>  pantoprazole  Injectable 40 milliGRAM(s) IV Push daily  polyethylene glycol 3350 17 Gram(s) Oral two times a day  sodium chloride 0.9%. 1000 milliLiter(s) (100 mL/Hr) IV Continuous <Continuous>  valproate sodium   IVPB 1000 milliGRAM(s) IV Intermittent every 12 hours    MEDICATIONS  (PRN):  benzocaine 20% Spray 1 Spray(s) Topical four times a day PRN sore throat  bisacodyl 5 milliGRAM(s) Oral every 12 hours PRN Constipation  bisacodyl Suppository 10 milliGRAM(s) Rectal daily PRN Constipation  LORazepam   Injectable 2 milliGRAM(s) IV Push once PRN Seizure Activity  OLANZapine 2.5 milliGRAM(s) Oral once PRN if patient very agitated  OLANZapine Injectable 2.5 milliGRAM(s) IntraMuscular every 12 hours PRN Agitation  OLANZapine Injectable 5 milliGRAM(s) IntraMuscular every 8 hours PRN agitation  ondansetron Injectable 4 milliGRAM(s) IV Push every 8 hours PRN Nausea and/or Vomiting      ALLERGIES:  Allergies    Zonegran (Other)  Dilantin (Hives)  Bactrim (Unknown)  phenobarbital (Unknown)  MSG (Unknown)    Intolerances    antihistamines (Unknown)  Keppra (Unknown)  Fish Products (Unknown)  Neurontin (Unknown)  Topamax (Sedation/Somnol)  Trileptal (Nausea)  shellfish (Unknown)      LABS:              CAPILLARY BLOOD GLUCOSE          RADIOLOGY & ADDITIONAL TESTS: Reviewed.

## 2025-05-01 NOTE — PROGRESS NOTE ADULT - SUBJECTIVE AND OBJECTIVE BOX
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 Collateral obtained from daughter and family friend at bedside. They reported that patient had a generalized toniconic seizure 3-minute duration on 4/24 aborted with midazolam intranasal. However, since then she has had episodes of staring near daily. It is unclear how many times a day it occurs.    The patient's mental status (baseline waxing and waning or w/ AMS) was getting worse with the daily staring episodes. Her mental status gradually declined over the past year whereas she was able to perform daily chores and ambulates independently, but now requires a rolling walker. Also, was able to feed herself, but required assistance on 4/28 that prompted a call to her neurologist. Patient's daughter reached out to Dr Najjar's office (outpatient neurologist), and was advised to present to ED if patient does not return to baseline. Of note, patient had an upcoming appointment with Dr Najjar on 4/29. Upon admission to [facility], CT of the abdomen/pelvis was obtained in ED that showed No bowel obstruction, but rectum was filled with multiple small stool balls as well as there was moderate right colonic stool burden, and signs of Colonic diverticulosis without diverticulitis. Given these findings as well as ongoing nausea and vomiting, GI was consulted. Patient was provided multiple enemas, and ultimately disimpaction that led to significant improvement with vomiting, and the nausea was managed with zofran. Patient had no further episodes or complaints of nausea and vomiting as of 4/30, and GI recommendations were followed to provide protonix daily, miralax BID, and obtain colonoscopy outpatient as there were previous recommendations to repeat in 2023 following the initial one on 11/10/20 that showed 6 small polyps that were removed. The explanation provided for nausea and vomiting was most likely due to a motility issue from moderate stool burden on CT scan. Psychiatry was consulted for agitation/delirium, and appreciate recommendations to start zyprexa PRN with standing dose in evening, as well as lexapro. Speech and swallow evaluated patient, and recommended a pureed diet that patient has been tolerating. PT evaluated patient on 4/29, and recommended LUDIVINA, but as per  family is refusing those services and would prefer discharge to home with already arranged 24hrs care. There were 3 electrographic seizures captured on EEG of which some correlated with staring spells, and the most recent was on 4/29 at 5:30pm. Patient's seizure medication regimen has been adjusted, and will continue in an effort to taper off carbamazepine given reports of refusal to take PO meds at times. Instead Vimpat has been added, and so far managed seizures well to date.     Hospital course was complicated with atrial fibrillation on 5/1/25 around 12am, and cardiology was consulted, and recommendations provided to administer PO metoprolol tartrate 25mg BID with further adjustments made throughout the day. Also, eliquis was started and plan for TTE. Given the ongoing rapid atrial fibrillation with minimal improvements, patient was transferred to medicine service for further management.     SUBJECTIVE: ***    ROS: otherwise negative      T(C): 36.8 (05-01-25 @ 13:34), Max: 37.4 (04-30-25 @ 20:40)  HR: 107 (05-01-25 @ 11:26) (73 - 126)  BP: 105/67 (05-01-25 @ 11:26) (73/51 - 150/85)  RR: 17 (05-01-25 @ 11:26) (17 - 21)  SpO2: 97% (05-01-25 @ 11:26) (91% - 98%)  Wt(kg): --Vital Signs Last 24 Hrs  T(C): 36.8 (01 May 2025 13:34), Max: 37.4 (30 Apr 2025 20:40)  T(F): 98.2 (01 May 2025 13:34), Max: 99.4 (30 Apr 2025 20:40)  HR: 107 (01 May 2025 11:26) (73 - 126)  BP: 105/67 (01 May 2025 11:26) (73/51 - 150/85)  BP(mean): 81 (01 May 2025 11:26) (58 - 109)  RR: 17 (01 May 2025 11:26) (17 - 21)  SpO2: 97% (01 May 2025 11:26) (91% - 98%)    Parameters below as of 01 May 2025 11:26  Patient On (Oxygen Delivery Method): room air      PHYSICAL EXAM:  General: nad, lying in bed, eeg leads applied  HEENT: NC/AT; MMM  Cardiovascular: +S1/S2, tachycardic  Respiratory: CTA B/L; no W/R/R  Ab: soft, ntnd  ext: wwp        LABS:                CAPILLARY BLOOD GLUCOSE                MEDICATIONS  (STANDING):  apixaban 5 milliGRAM(s) Oral every 12 hours  bacitracin   Ointment 1 Application(s) Topical three times a day  carBAMazepine Chewable 450 milliGRAM(s) Chew <User Schedule>  escitalopram 5 milliGRAM(s) Oral <User Schedule>  lacosamide IVPB 100 milliGRAM(s) IV Intermittent <User Schedule>  losartan 50 milliGRAM(s) Oral daily  melatonin 5 milliGRAM(s) Oral at bedtime  metoprolol tartrate 37.5 milliGRAM(s) Oral <User Schedule>  OLANZapine Injectable 5 milliGRAM(s) IntraMuscular <User Schedule>  pantoprazole  Injectable 40 milliGRAM(s) IV Push daily  polyethylene glycol 3350 17 Gram(s) Oral two times a day  sodium chloride 0.9%. 1000 milliLiter(s) (100 mL/Hr) IV Continuous <Continuous>  valproate sodium   IVPB 1000 milliGRAM(s) IV Intermittent every 12 hours    MEDICATIONS  (PRN):  acetaminophen     Tablet .. 650 milliGRAM(s) Oral every 6 hours PRN Temp greater or equal to 38.5C (101.3F), Mild Pain (1 - 3)  benzocaine 20% Spray 1 Spray(s) Topical four times a day PRN sore throat  bisacodyl 5 milliGRAM(s) Oral every 12 hours PRN Constipation  bisacodyl Suppository 10 milliGRAM(s) Rectal daily PRN Constipation  LORazepam   Injectable 2 milliGRAM(s) IV Push once PRN Seizure Activity  OLANZapine 2.5 milliGRAM(s) Oral once PRN if patient very agitated  OLANZapine Injectable 2.5 milliGRAM(s) IntraMuscular every 12 hours PRN Agitation  OLANZapine Injectable 5 milliGRAM(s) IntraMuscular every 8 hours PRN agitation  ondansetron Injectable 4 milliGRAM(s) IV Push every 8 hours PRN Nausea and/or Vomiting      RADIOLOGY & ADDITIONAL TESTS: Reviewed

## 2025-05-01 NOTE — EEG REPORT - NS EEG TEXT BOX
[facility] Department of Neurology  Epilepsy Monitoring Unit video-Electroencephalogram    Patient Name:	SUKHJINDER DIEGO    :	1945  MRN:	[id number]    Study Start Date/Time:  2025, 12:14:52 AM  Study End Date/Time: in progress    Referred by: Dr. Souhel Najjar    Brief Clinical History:  SUKHJINDER DIEGO is a 80 year old Female with epilepsy, nausea and vomiting; study performed to investigate for seizures or markers of epilepsy.  Diagnosis Code:   R56.9 convulsions/seizure    Pertinent Medications:  Carbamazepine, Depakote (IV Vimpat given in lieu of Carbamazepine due to vomiting)    Acquisition Details:  Electroencephalography was acquired using a minimum of 21 channels on an [employer] Neurology system v 9.3.1 with electrode placement according to the standard International 10-20 system following ACNS (American Clinical Neurophysiology Society) guidelines for Long-Term Video EEG monitoring.  Anterior temporal T1 and T2 electrodes were utilized whenever possible.   The [employer] automated spike & seizure detections were all reviewed in detail, in addition to extensive portions of raw EEG.  The live video was monitored continuously by trained technicians to identify events and specialty nurses trained in seizure management supervised the care of the patient in the epilepsy unit.      Daily Updates (from 07:00 am until 07:00 am):  Day 3  -2025  Background:  continuous, with predominantly theta frequencies.  Symmetry:  Occasional (1-9%) right temporal polymorphic delta slowing.   Posterior Dominant Rhythm:  No clear PDR   Organization: Rudimentary.  Voltage:  Normal (20+ uV)  Variability: Yes. 		Reactivity: Yes.  N2 sleep: Symmetric, synchronous spindles and K complexes.  Spontaneous Activity:  Frequent right> left temporal sharp wave epileptiform discharges, improving.  Periodic/rhythmic activity:  None.  Events:  No electrographic seizures.  1)	At roughly midnight, patient noted to be in afib rhythm with -130bpm.  Provocations:  Hyperventilation and Photic stimulation: was not performed.    Daily Summary:    1)	Moderate generalized slowing suggestive of a similar degree of diffuse or multifocal  dysfunction.  2)	Frequent right> left temporal sharp wave discharges, improving.  3)	No seizures captured.  4)	Afib rhythm noted at roughly midnight with -130 bpm, subsequently patient was transferred to telemetry unit for further monitoring.    Claudia Owens DO  Attending Neurologist, [facility] Epilepsy Program   [facility] Department of Neurology  Epilepsy Monitoring Unit video-Electroencephalogram    Patient Name:	SUKHJINDER DIEGO    :	1945  MRN:	[id number]    Study Start Date/Time:  2025, 12:14:52 AM  Study End Date/Time: in progress    Referred by: Dr. Souhel Najjar    Brief Clinical History:  SUKHJINDER DIEGO is a 80 year old Female with epilepsy, nausea and vomiting; study performed to investigate for seizures or markers of epilepsy.  Diagnosis Code:   R56.9 convulsions/seizure    Pertinent Medications:  Carbamazepine, Depakote (IV Vimpat given in lieu of Carbamazepine due to vomiting)    Acquisition Details:  Electroencephalography was acquired using a minimum of 21 channels on an [employer] Neurology system v 9.3.1 with electrode placement according to the standard International 10-20 system following ACNS (American Clinical Neurophysiology Society) guidelines for Long-Term Video EEG monitoring.  Anterior temporal T1 and T2 electrodes were utilized whenever possible.   The XLTEK automated spike & seizure detections were all reviewed in detail, in addition to extensive portions of raw EEG.  The live video was monitored continuously by trained technicians to identify events and specialty nurses trained in seizure management supervised the care of the patient in the epilepsy unit.      Daily Updates (from 07:00 am until 07:00 am):  Day 3  -2025  Background:  continuous, with predominantly theta frequencies.  Symmetry:  Occasional (1-9%) right temporal polymorphic delta slowing.   Posterior Dominant Rhythm:  No clear PDR   Organization: Rudimentary.  Voltage:  Normal (20+ uV)  Variability: Yes. 		Reactivity: Yes.  N2 sleep: Symmetric, synchronous spindles and K complexes.  Spontaneous Activity:  Frequent right> left temporal sharp wave epileptiform discharges, improving.  Periodic/rhythmic activity:  None.  Events:  No electrographic seizures.  1)	At roughly midnight, patient noted to be in atrial tachycardia rhythm with -130bpm.  Provocations:  Hyperventilation and Photic stimulation: was not performed.    Daily Summary:    1)	Moderate generalized slowing suggestive of a similar degree of diffuse or multifocal  dysfunction.  2)	Frequent right> left temporal sharp wave discharges, improving.  3)	No seizures captured.  4)	Atrial tachycardia rhythm noted at roughly midnight with -130 bpm, subsequently patient was transferred to telemetry unit for further monitoring.    Claudia Owens DO  Attending Neurologist, [facility] Epilepsy [facility]

## 2025-05-01 NOTE — CHART NOTE - NSCHARTNOTEFT_GEN_A_CORE
Overnight patient noted tachycardic 120s-130s with new afib, different from previously characterized atrial tachycardia with 1st degree AV block, -150, no fever, patient not complaining of pain, is urinating through primafit, had bowel movement earlier today, otherwise resting comfortably, HR improved to 73 with metoprolol 2.5mg IV    Discussed with hospitalist and cardiology fellow, switched metoprolol tartrate IV to metoprolol tartrate 25mg twice daily po (per pharmacy this is equivalent conversion from home metoprolol succinate 25mg ER daily as succinate cannot be crushed). Per cardiology while patient would benefit from monitoring in telemetry can defer transfer to telemetry to AM per primary team after patient has awoken since the patient's HR has already normalized and as per discussion with daughter Marylou at bedside, who would like to prioritize patient's rest and minimize sleep disruptions that may precipitate agitation.    Patient intermittently requesting to pass stool however unable to pass on her own, noted some per discussion with daughter will defer MARIA ELENA and possible disimpaction at this time and order suppositories as needed. Overnight patient noted tachycardic 120s-130s with new afib, different from previously characterized atrial tachycardia with 1st degree AV block, -150, no fever, patient not complaining of pain, is urinating through primafit, had bowel movement earlier today, otherwise resting comfortably, HR improved to 73 with metoprolol 2.5mg IV    Discussed with hospitalist and cardiology fellow, switched metoprolol tartrate IV to metoprolol tartrate 25mg twice daily po (per pharmacy this is equivalent conversion from home metoprolol succinate 25mg ER daily as succinate cannot be crushed). Per cardiology while patient would benefit from monitoring in telemetry can defer transfer to telemetry to AM per primary team after patient has awoken since the patient's HR has already normalized and as per discussion with daughter Marylou at bedside, who would like to prioritize patient's rest and minimize sleep disruptions that may precipitate agitation.    Patient intermittently requesting to pass stool however unable to pass on her own, per discussion with daughter will defer MARIA ELENA and possible disimpaction at this time and order suppositories as needed. Overnight patient noted tachycardic 120s-130s with new afib, different from previously characterized atrial tachycardia with 1st degree AV block, -150, no fever, patient not complaining of pain, is urinating through primafit, had bowel movement earlier today, otherwise resting comfortably, HR improved to 73 with metoprolol 2.5mg IV    Discussed with hospitalist and cardiology fellow, switched metoprolol tartrate IV to metoprolol tartrate 25mg twice daily po (per pharmacy this is equivalent conversion from home metoprolol succinate 25mg ER daily as succinate cannot be crushed). Per cardiology while patient would benefit from monitoring in telemetry can defer transfer to telemetry to AM per primary team after patient has awoken since the patient's HR has already normalized and as per discussion with daughter Marylou at bedside, who would like to prioritize patient's rest and minimize sleep disruptions that may precipitate agitation. Ordered limited and complete echo per cardiology.    Patient intermittently requesting to pass stool however unable to pass on her own, per discussion with daughter will defer MARIA ELENA and possible disimpaction at this time and order suppositories as needed. Overnight patient noted tachycardic 120s-130s with new afib, different from previously characterized atrial tachycardia with 1st degree AV block, -150, no fever, patient not complaining of pain, is urinating through primafit, had bowel movement earlier today, otherwise resting comfortably, HR improved to 73 with metoprolol 2.5mg IV    Discussed with hospitalist and cardiology fellow, switched metoprolol tartrate IV to metoprolol tartrate 25mg twice daily po (per pharmacy this is equivalent conversion from home metoprolol succinate 25mg ER daily as succinate cannot be crushed). Per cardiology while patient would benefit from monitoring in telemetry can defer transfer to telemetry to AM per primary team after patient has awoken since the patient's HR has already normalized and as per discussion with daughter Marylou at bedside, who would like to prioritize patient's rest and minimize sleep disruptions that may precipitate agitation. Ordered limited and complete echo per cardiology.    Patient intermittently requesting to pass stool however unable to pass on her own, per discussion with daughter will defer MARIA ELENA and possible disimpaction at this time and order suppositories as needed.    UPDATE - upon HR check one hour later noted that HR ranged between 100s-120s, discussed with daughter who agreed with transfer to telemetry, reordered metoprolol tartrate 25mg to be given stat Overnight patient noted tachycardic 120s-130s with new afib, different from previously characterized atrial tachycardia with 1st degree AV block, -150, no fever, patient not complaining of pain, is urinating through primafit, had bowel movement earlier today, otherwise resting comfortably, HR improved to 73 with metoprolol 2.5mg IV    Discussed with hospitalist and cardiology fellow, switched metoprolol tartrate IV to metoprolol tartrate 25mg twice daily po (per pharmacy this is equivalent conversion from home metoprolol succinate 25mg ER daily as succinate cannot be crushed). Per cardiology while patient would benefit from monitoring in telemetry can defer transfer to telemetry to AM per primary team after patient has awoken since the patient's HR has already normalized and as per discussion with daughter Marylou at bedside, who would like to prioritize patient's rest and minimize sleep disruptions that may precipitate agitation. Ordered limited and complete echo per cardiology.    Patient intermittently requesting to pass stool however unable to pass on her own, per discussion with daughter will defer MARIA ELENA and possible disimpaction at this time and order suppositories as needed.    UPDATE - upon HR check one hour later noted that HR ranged between 100s-120s, discussed with daughter who agreed with transfer to telemetry, reordered metoprolol tartrate 25mg to be given stat per cardiology recommendation

## 2025-05-01 NOTE — PROGRESS NOTE ADULT - PROBLEM SELECTOR PLAN 5
F: none  E: replete as needed  N: minced and moist  VTE ppx: eliquis  GI ppx: none   Dispo: [facility]  Code status: full F: none  E: replete as needed  N: minced and moist  VTE ppx: eliquis  GI ppx: pantoprazole  Dispo: [facility]  Code status: full

## 2025-05-01 NOTE — BH CONSULTATION LIAISON PROGRESS NOTE - NSBHFUPINTERVALHXFT_PSY_A_CORE
As per daughter at bedside pt had a much better night. She did not require Zyprexa. On evaluation today pt is eating with enthusiasm. She is alert, oriented to name, place. Answering all questions appropriately. Has poor memory of events leading up to hospitalization. However remembered she was vomiting prior to admission. Pt reports anxiety. Denies feeling depressed or hopeless. No evidence of SI.   Daughter reports frequent sundowning at home. Open to medication recommendation to address anxiety. We discussed potential benefit of SSRI in addressing anxiety/agitation. Pt's daughter would like to discuss with Dr. Vo prior to initiating new medications.

## 2025-05-01 NOTE — PROVIDER CONTACT NOTE (OTHER) - NAME OF MD/NP/PA/DO NOTIFIED:
[place] [facility] [place] Gar[place] (who is at bedside
Ernestine
Felipe Sinha

## 2025-05-01 NOTE — PROGRESS NOTE ADULT - ASSESSMENT
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 Patient had 3 electrographic seizures yesterday, one which correlated with her staring spells witnessed by daughters at home. Last seizure was 5:36 pm on 4/29/25. 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 Patient had 3 electrographic seizures on 4/29, one which correlated with her staring spells witnessed by daughters at home. Last seizure was 5:36 pm on 4/29/25. Now concerns for rapid atrial fibrillation that started overnight around 12am with multiple med adjustments for rate control but has been unsuccessful up to this point. Cardiology has been consulted, and appreciate their recommendations, and now acceptance to their service.  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 Patient had 3 electrographic seizures on 4/29, one which correlated with her staring spells witnessed by daughters at home. Last seizure was 5:36 pm on 4/29/25. Now concerns for rapid atrial fibrillation that started overnight around 12am with multiple med adjustments for rate control but has been unsuccessful up to this point. Cardiology has been consulted, and appreciate their recommendations.  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     Patient had 3 electrographic seizures on 4/29, one which correlated with her staring spells witnessed by daughters at home. Last seizure was 5:36 pm on 4/29/25 and sucessfully aborted with addition of Vimpat. Now concerns for rapid atrial fibrillation that started overnight around 12am with multiple med adjustments for rate control but has been unsuccessful up to this point. Vimpat has lower risk for atrial fib/tachycardia but as a precaution, dose was decreased back to 100mg BID. Most likely etiology for new onset afib is patient's inconsistent beta blocker use and age-related factors along with prior history of tachycardia.     Cardiology has been consulted, and appreciate their recommendations. Patient currently being transferred to medicine service for close monitoring/ management of new onset afib with epilepsy continuing to closely follow.

## 2025-05-01 NOTE — PROVIDER CONTACT NOTE (OTHER) - SITUATION
Pt noted to be afib but previous assessment was sinus tachy
BP in 70s systolic in both left and right arm    73/51, 76/53
S: Patient remains increasingly irritable, pulling on IV lines, EEG leads, and making multiple attempts to climb out of bed.IV lines/EEG leads are necessary for medical management
S: Patient arrived from ED, irritable, anxious, and making multiple attempts to jump out of bed. Despite multiple redirections patient remains non compliant.

## 2025-05-01 NOTE — PROGRESS NOTE ADULT - PROBLEM SELECTOR PLAN 1
Hx of seizures since 2003 following R temporal & frontal lobes resection. Admitted for uncontrolled nausea and vomiting, and new staring spell episodes that has been identified as seizure events on vEEG monitoring. 3 seizures have been captured so far for which 1mg ativan IV has been given. Last sz was on 4/29/25 at 5:36pm.  AED levels: Carbamazepine 8.7 and VPA 72.7  on 4/28/25  -vimpat 100 mg BID  -depakote 1000 mg IV BID  -carbamazepine 450 mg BID  -Ativan 2mg IVP PRN for seizures > 2mins  -Neurological & Vitals assessment Q8hrs  -Maintain Seizure/Fall/Aspiration precautions  -f/u vEEG  -f/u epilepsy recs

## 2025-05-01 NOTE — PROGRESS NOTE ADULT - PROBLEM SELECTOR PLAN 4
Hx of HTN    - 4/30 Held home dose metoprolol succinate 25mg QD and Olmesartan 20mg PO QD due to lethargy  - 4/30 Metoprolol 2.5mg IV Q6hrs was given   - 5/1 Started Metoprolol tartrate 25mg PO BID then will adjust to 37.5mg BID starting PM on 5/1 due to rapid atrial fibrillation as per cardiology. (Home regimen - Metoprolol succinate 25mg QD)..   - Olmesartan 20mg PO daily interchanged for losartan 50mg QD with hold parameters

## 2025-05-01 NOTE — PROGRESS NOTE ADULT - PROBLEM SELECTOR PLAN 5
EKG recordings with Atrial fibrillation on 5/1 around 5am.    - Cardiology consulted, and appreciate recommendations for metoprolol tartrate 25mg BID PO as patient was able to tolerate oral meds at that time. Also, ordered TTE, started eliquis 5mg BID (will start in PM since patient got lovenox in AM). Also appreciate cardiology team's acceptance of patient to their service. EKG recordings with Atrial fibrillation on 5/1 around 5am.    - Cardiology consulted, and appreciate recommendations for metoprolol tartrate 25mg BID PO as patient was able to tolerate oral meds at that time. Also, ordered TTE, started eliquis 5mg BID (will start in PM since patient got lovenox in AM). No neurologic objection to A/C and no bolus dose of A/C is necessary.

## 2025-05-01 NOTE — PROGRESS NOTE ADULT - ASSESSMENT
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 GI consulted for nausea/vomiting.     Nausea possibly 2/2 to central cause (in setting of active seizures) vs. gastroenteritis given sudden onset vs. motility related issue (moderate stool burden noted on CT abd/pelvis) vs. gastritis (will start PPI trial). Less likely due to mechanical or obstructive cause given no major abnormalities noted on CT abdomen/pelvis.     Most recent EGD:   - per daughter, is not sure when patient last had an EGD     Most recent colonoscopy:   - in out patient records had a colonoscopy with Dr. Hidalgo 11/10/2020   - six diminutive small semi-sessile and semi-pedunculated polyps were found in the cecum (1), ascending colon (3), and sigmoid colon (2)  - polyps removed by cold forceps and cold snare   - mild sigmoid diverticulosis   - small internal hemorrhoids   - recommended to undergo repeat colonoscopy in 3 years (2023)     Patient now much improved on antiemetics and antiacid, will need better management for treatment and prevention of constipation    Recommendations:   - pantoprazole 40 mg IV daily, discharge on PPI PO daily until GI follow up  - Zofran 4 mg IV q8 for nausea/vomiting (monitor QTC while on Zofran), d/c home with short supply  - having BMs now but still constipated  - c/w Miralax 17 g PO BID to treat constipation    - discussed dietary recommendations regarding fiber intake and nutrition  - due for colonoscopy in 2023 per Dr. Hidalgo's procedure note based on risks/benefits in the setting of overall comorbidities; can consider role of repeat as out-patient   - GI to sign off at this time  - Will arrange outpatient FU with patient for our GI office.     Case discussed with Dr. Malik.    Forest Jackson DO, PhD  Gastroenterology Fellow  GI Consult Weekday 7am-5pm Pager: [phone]  Weeknights/Weekend/Holiday Coverage: Please Call the  for contact info

## 2025-05-01 NOTE — PROVIDER CONTACT NOTE (OTHER) - ACTION/TREATMENT ORDERED:
Patient received IM Zyprexa 2.5mg IM to RUE
reassessed BP in a few minutes
Mittens placed at 0235 per order. Patient received IM Zyprexa 2.5 mg to LUE, pending effects.

## 2025-05-01 NOTE — BH CONSULTATION LIAISON PROGRESS NOTE - NSBHCONSULTRECOMMENDOTHER_PSY_A_CORE FT
Recommendations  - melatonin 5mg QHS to promote sleep regulation  - Zyprexa 2.5 mg po/IM bid prn agitation  - Consider a trial of SSRI (Lexapro 5 mg daily) as daughter reports anxiety and sundowning symptoms at baseline.   - continue work-up per primary team  - non-medical interventions  	-orientation protocols: Provision of clocks, calendars, windows with outside views and frequent verbal reorientation of patients.  	-cognitive stimulation: Regular visits from family and friends. Avoid overstimulation (particularly at night)  	-facilitation of physiologic sleep: Nursing and medical procedures, including the administration of medications, should be avoided during sleeping when 		possible and night-time noise should be reduced.  	-early mobilization and minimized use of physical restraints for patients with limited mobility.   	-visual and hearing aids for patients with these impairments

## 2025-05-01 NOTE — PROGRESS NOTE ADULT - ASSESSMENT
80F with PMH of brain tumor resection in 2003, alzheimer's dementia, seizures, breast cancer and hypertension, admitted to the epilepsy service with nausea, vomiting and worsening cognition with increased frequency of staring spell and overall decline, transferred to medicine for further management of afib with RVR.

## 2025-05-01 NOTE — BH CONSULTATION LIAISON PROGRESS NOTE - CURRENT MEDICATION
MEDICATIONS  (STANDING):  bacitracin   Ointment 1 Application(s) Topical three times a day  carBAMazepine Chewable 450 milliGRAM(s) Chew <User Schedule>  enoxaparin Injectable 40 milliGRAM(s) SubCutaneous every 24 hours  lacosamide IVPB 150 milliGRAM(s) IV Intermittent every 12 hours  melatonin 5 milliGRAM(s) Oral at bedtime  metoprolol tartrate Injectable 2.5 milliGRAM(s) IV Push every 6 hours  OLANZapine Injectable 5 milliGRAM(s) IntraMuscular <User Schedule>  sodium chloride 0.9%. 1000 milliLiter(s) (100 mL/Hr) IV Continuous <Continuous>  valproate sodium   IVPB 1000 milliGRAM(s) IV Intermittent every 12 hours    MEDICATIONS  (PRN):  bisacodyl 5 milliGRAM(s) Oral every 12 hours PRN Constipation  LORazepam   Injectable 2 milliGRAM(s) IV Push once PRN Seizure Activity  OLANZapine Injectable 2.5 milliGRAM(s) IntraMuscular every 12 hours PRN Agitation  OLANZapine Injectable 5 milliGRAM(s) IntraMuscular every 8 hours PRN agitation  ondansetron Injectable 4 milliGRAM(s) IV Push every 8 hours PRN Nausea and/or Vomiting  
MEDICATIONS  (STANDING):  apixaban 5 milliGRAM(s) Oral every 12 hours  bacitracin   Ointment 1 Application(s) Topical three times a day  carBAMazepine Chewable 450 milliGRAM(s) Chew <User Schedule>  escitalopram 5 milliGRAM(s) Oral <User Schedule>  lacosamide IVPB 100 milliGRAM(s) IV Intermittent <User Schedule>  losartan 50 milliGRAM(s) Oral daily  melatonin 5 milliGRAM(s) Oral at bedtime  metoprolol tartrate 37.5 milliGRAM(s) Oral every 12 hours  OLANZapine 5 milliGRAM(s) Oral every 24 hours  pantoprazole  Injectable 40 milliGRAM(s) IV Push daily  polyethylene glycol 3350 17 Gram(s) Oral two times a day  sodium chloride 0.9%. 1000 milliLiter(s) (100 mL/Hr) IV Continuous <Continuous>  valproate sodium   IVPB 1000 milliGRAM(s) IV Intermittent every 12 hours    MEDICATIONS  (PRN):  acetaminophen     Tablet .. 650 milliGRAM(s) Oral every 6 hours PRN Temp greater or equal to 38.5C (101.3F), Mild Pain (1 - 3)  benzocaine 20% Spray 1 Spray(s) Topical four times a day PRN sore throat  bisacodyl 5 milliGRAM(s) Oral every 12 hours PRN Constipation  bisacodyl Suppository 10 milliGRAM(s) Rectal daily PRN Constipation  LORazepam   Injectable 2 milliGRAM(s) IV Push once PRN Seizure Activity  ondansetron Injectable 4 milliGRAM(s) IV Push every 8 hours PRN Nausea and/or Vomiting

## 2025-05-01 NOTE — PROGRESS NOTE ADULT - PROBLEM SELECTOR PLAN 2
Nausea and vomiting for a few days, resolved.    - large stool burden without obstruction seen on abdominal CT obtained in the ER.  - 4/29 suppositories, enema and disimpaction performed with improvement.  - 4/29 GI consulted, and appreciate recommendations to start protonix 40mg QD and miralax 17mg PO BID, and continue zofran 4mg Q8hrs PRN, and repeat colonoscopy outpatient (last was 11/10/2020).   - Speech therapist consulted for swallow evaluation and appreciate recs: recommend puree/ thin liquids with aspiration precautions- feed only when awake and alert (but lethargic in AM). Will follow-up on 5/1 regarding potential advanced diet for whole tabs. Nausea and vomiting for a few days, resolved.    - large stool burden without obstruction seen on abdominal CT obtained in the ER.  - 4/29 suppositories, enema and disimpaction performed with improvement.  - 4/29 GI consulted, and appreciate recommendations to start protonix 40mg QD and miralax 17mg PO BID, and continue zofran 4mg Q8hrs PRN, and repeat colonoscopy outpatient (last was 11/10/2020).   - 4/29 Speech therapist consulted for swallow evaluation and appreciate recs: recommend puree/ thin liquids with aspiration precautions- feed only when awake and alert (but lethargic in AM).   5/1 Speech therapist swallowing re-evaluation and recommendations appreciated - minced/moist and thin liquids, meds in puree as tolerated.

## 2025-05-01 NOTE — PROGRESS NOTE ADULT - PROBLEM SELECTOR PLAN 6
Nutrition & Prophylaxis:    F: NS 100ml/hr while NPO  E:  K>4, Mg>2, Phos >3  N: NPO except meds  DVT PPx: SCDs & Lovenox 40mg QD  GI PPx: Not indicated  Dispo: [facility]  Code: FULL CODE. Progressive decline in gait over the past year, and now requires a rolling walker.    4/29 - PT evaluated patient and recommends subacute rehab (family/daughter refusing, and wish for patient to return home with 24hrs home care already in place).   4/29 - OT evaluation of patient and also recommend subacute rehab but as above patient's family wish is to return home w/ services.

## 2025-05-01 NOTE — PROVIDER CONTACT NOTE (OTHER) - REASON
BP low
Pt is noted to be on afib (115-140s)
Patient pulling on IV line/ EEG leads/ and making attempts to jump out of bed
Patient irritable/anxious/ Not redirectable at this time

## 2025-05-01 NOTE — PROGRESS NOTE ADULT - SUBJECTIVE AND OBJECTIVE BOX
EPILEPSY PROGRESS NOTE:  Patient seen and examined at bedside, and noted to be in rapid A-fibrillation with rate up to 140's for which metoprolol 2.5mg IVP was given.     REVIEW OF SYSTEMS:  CARDIOVASCULAR:  No chest pain, chest pressure or chest discomfort. No palpitations or edema. +AFIB noted on telemetry  RESPIRATORY:  No shortness of breath, cough or sputum.  GASTROINTESTINAL:  Previously w/ nausea and vomiting and severely constipated.  GENITOURINARY: No Burning on urination.   NEUROLOGICAL:  see HPI  MUSCULOSKELETAL:  B/L Knee and ankle pain  HEMATOLOGIC:  No anemia, bleeding or bruising.  LYMPHATICS:  No enlarged nodes. No history of splenectomy.  PSYCHIATRIC:  No history of depression or anxiety.  ENDOCRINOLOGIC:  No reports of sweating, cold or heat intolerance. No polyuria or polydipsia.  ALLERGIES:  No history of asthma, hives, eczema or rhinitis.    MEDICATIONS  (STANDING):  bacitracin   Ointment 1 Application(s) Topical three times a day  carBAMazepine Chewable 450 milliGRAM(s) Chew <User Schedule>  enoxaparin Injectable 40 milliGRAM(s) SubCutaneous every 24 hours  escitalopram 5 milliGRAM(s) Oral <User Schedule>  lacosamide IVPB 100 milliGRAM(s) IV Intermittent <User Schedule>  melatonin 5 milliGRAM(s) Oral at bedtime  metoprolol tartrate 25 milliGRAM(s) Oral two times a day  OLANZapine Injectable 5 milliGRAM(s) IntraMuscular <User Schedule>  pantoprazole  Injectable 40 milliGRAM(s) IV Push daily  polyethylene glycol 3350 17 Gram(s) Oral two times a day  sodium chloride 0.9%. 1000 milliLiter(s) (100 mL/Hr) IV Continuous <Continuous>  valproate sodium   IVPB 1000 milliGRAM(s) IV Intermittent every 12 hours    MEDICATIONS  (PRN):  benzocaine 20% Spray 1 Spray(s) Topical four times a day PRN sore throat  bisacodyl 5 milliGRAM(s) Oral every 12 hours PRN Constipation  bisacodyl Suppository 10 milliGRAM(s) Rectal daily PRN Constipation  LORazepam   Injectable 2 milliGRAM(s) IV Push once PRN Seizure Activity  OLANZapine 2.5 milliGRAM(s) Oral once PRN if patient very agitated  OLANZapine Injectable 2.5 milliGRAM(s) IntraMuscular every 12 hours PRN Agitation  OLANZapine Injectable 5 milliGRAM(s) IntraMuscular every 8 hours PRN agitation  ondansetron Injectable 4 milliGRAM(s) IV Push every 8 hours PRN Nausea and/or Vomiting    VITAL SIGNS:  T(C): 36.3 (05-01-25 @ 05:24), Max: 37.4 (04-30-25 @ 20:40)  HR: 118 (05-01-25 @ 08:24) (73 - 126)  BP: 141/80 (05-01-25 @ 08:24) (94/60 - 161/78)  RR: 21 (05-01-25 @ 08:24) (18 - 21)  SpO2: 95% (05-01-25 @ 08:24) (91% - 98%)  Wt(kg): --    PHYSICAL EXAM:  Neurologic:     -Mental Status: Alert and oriented to name. [facility], but disoriented to date instead provided accurate date of birth. Speech is fluent with intact naming, repetition, no dysarthria or aphasia noted. Follows some simple commands, but indirectly confused (will attempt to respond with sentences that is unrelated to current topic). State that the red light on finger sensor is blood.      -Cranial Nerves:          II: Visual fields are full to confrontation.          III, IV, VI: EOMI without nystagmus. PERRLA 3mm brisk b/l          V:  Facial sensation V1-V3 equal and intact           VII: Face is symmetric with normal eye closure and smile          VIII: Hearing is bilaterally intact to finger rub          IX, X: Uvula is midline and soft palate rises symmetrically          XI: Head turning and shoulder shrug are intact.          XII: Tongue protrudes midline     -Motor: Normal bulk and tone. B/L UE 5/5, RLE 3/5 and LLE 2/5     -Sensation: Unable to assess 2/2 AMS     -Coordination: Unable to perform 2/2 AMS     -Gait: Deferred    EEG Daily Summary (5/1/2025):    1)	Moderate generalized slowing suggestive of a similar degree of diffuse or multifocal  dysfunction.  2)	Frequent right> left temporal sharp wave discharges, improving.  3)	No seizures captured.  4)	Atrial tachycardia rhythm noted at roughly midnight with -130 bpm, subsequently patient was transferred to telemetry unit for further monitoring.    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 Collateral obtained from daughter and family friend at bedside. They reported that patient had a generalized toniconic seizure 3-minute duration on 4/24 aborted with midazolam intranasal. However, since then she has had episodes of staring near daily. It is unclear how many times a day it occurs.    The patient's mental status (baseline waxing and waning or w/ AMS) was getting worse with the daily staring episodes. Her mental status gradually declined over the past year whereas she was able to perform daily chores and ambulates independently, but now requires a rolling walker. Also, was able to feed herself, but required assistance on 4/28 that prompted a call to her neurologist. Patient's daughter reached out to Dr Najjar's office (outpatient neurologist), and was advised to present to ED if patient does not return to baseline. Of note, patient had an upcoming appointment with Dr Najjar on 4/29. Upon admission to [facility], CT of the abdomen/pelvis was obtained in ED that showed No bowel obstruction, but rectum was filled with multiple small stool balls as well as there was moderate right colonic stool burden, and signs of Colonic diverticulosis without diverticulitis. Given these findings as well as ongoing nausea and vomiting, GI was consulted. Patient was provided multiple enemas, and ultimately disimpaction that led to significant improvement with vomiting, and the nausea was managed with zofran. Patient had no further episodes or complaints of nausea and vomiting as of 4/30, and GI recommendations were followed to provide protonix daily, miralax BID, and obtain colonoscopy outpatient as there were previous recommendations to repeat in 2023 following the initial one on 11/10/20 that showed 6 small polyps that were removed. The explanation provided for nausea and vomiting was most likely due to a motility issue from moderate stool burden on CT scan. Psychiatry was consulted for agitation/delirium, and appreciate recommendations to start zyprexa PRN with standing dose in evening, as well as lexapro. Speech and swallow evaluated patient, and recommended a pureed diet that patient has been tolerating. PT evaluated patient on 4/29, and recommended LUDIVINA, but as per  family is refusing those services and would prefer discharge to home with already arranged 24hrs care. There were 3 electrographic seizures captured on EEG of which some correlated with staring spells, and the most recent was on 4/29 at 5:30pm. Patient's seizure medication regimen has been adjusted, and will continue in an effort to taper off carbamazepine given reports of refusal to take PO meds at times. Instead Vimpat has been added, and so far managed seizures well to date.     Hospital course was complicated with atrial fibrillation on 5/1/25 around 12am, and cardiology was consulted, and recommendations provided to administer PO metoprolol tartrate 25mg BID with further adjustments made throughout the day. Also, eliquis was started and plan for TTE. Given the ongoing rapid atrial fibrillation with minimal improvements, patient was transferred to cardiac service for further management.     EPILEPSY PROGRESS NOTE:  Patient seen and examined at bedside, and noted to be in rapid A-fibrillation with rate up to 140's for which metoprolol 2.5mg IVP was given.     REVIEW OF SYSTEMS:  CARDIOVASCULAR:  No chest pain, chest pressure or chest discomfort. No palpitations or edema. +AFIB noted on telemetry  RESPIRATORY:  No shortness of breath, cough or sputum.  GASTROINTESTINAL:  Previously w/ nausea and vomiting and severely constipated.  GENITOURINARY: No Burning on urination.   NEUROLOGICAL:  see HPI  MUSCULOSKELETAL:  B/L Knee and ankle pain  HEMATOLOGIC:  No anemia, bleeding or bruising.  LYMPHATICS:  No enlarged nodes. No history of splenectomy.  PSYCHIATRIC:  No history of depression or anxiety.  ENDOCRINOLOGIC:  No reports of sweating, cold or heat intolerance. No polyuria or polydipsia.  ALLERGIES:  No history of asthma, hives, eczema or rhinitis.    MEDICATIONS  (STANDING):  bacitracin   Ointment 1 Application(s) Topical three times a day  carBAMazepine Chewable 450 milliGRAM(s) Chew <User Schedule>  enoxaparin Injectable 40 milliGRAM(s) SubCutaneous every 24 hours  escitalopram 5 milliGRAM(s) Oral <User Schedule>  lacosamide IVPB 100 milliGRAM(s) IV Intermittent <User Schedule>  melatonin 5 milliGRAM(s) Oral at bedtime  metoprolol tartrate 25 milliGRAM(s) Oral two times a day  OLANZapine Injectable 5 milliGRAM(s) IntraMuscular <User Schedule>  pantoprazole  Injectable 40 milliGRAM(s) IV Push daily  polyethylene glycol 3350 17 Gram(s) Oral two times a day  sodium chloride 0.9%. 1000 milliLiter(s) (100 mL/Hr) IV Continuous <Continuous>  valproate sodium   IVPB 1000 milliGRAM(s) IV Intermittent every 12 hours    MEDICATIONS  (PRN):  benzocaine 20% Spray 1 Spray(s) Topical four times a day PRN sore throat  bisacodyl 5 milliGRAM(s) Oral every 12 hours PRN Constipation  bisacodyl Suppository 10 milliGRAM(s) Rectal daily PRN Constipation  LORazepam   Injectable 2 milliGRAM(s) IV Push once PRN Seizure Activity  OLANZapine 2.5 milliGRAM(s) Oral once PRN if patient very agitated  OLANZapine Injectable 2.5 milliGRAM(s) IntraMuscular every 12 hours PRN Agitation  OLANZapine Injectable 5 milliGRAM(s) IntraMuscular every 8 hours PRN agitation  ondansetron Injectable 4 milliGRAM(s) IV Push every 8 hours PRN Nausea and/or Vomiting    VITAL SIGNS:  T(C): 36.3 (05-01-25 @ 05:24), Max: 37.4 (04-30-25 @ 20:40)  HR: 118 (05-01-25 @ 08:24) (73 - 126)  BP: 141/80 (05-01-25 @ 08:24) (94/60 - 161/78)  RR: 21 (05-01-25 @ 08:24) (18 - 21)  SpO2: 95% (05-01-25 @ 08:24) (91% - 98%)  Wt(kg): --    PHYSICAL EXAM:  Neurologic:     -Mental Status: Alert and oriented to name. [facility], but disoriented to date instead provided accurate date of birth. Speech is fluent with intact naming, repetition, no dysarthria or aphasia noted. Follows some simple commands, but indirectly confused (will attempt to respond with sentences that is unrelated to current topic). State that the red light on finger sensor is blood.      -Cranial Nerves:          II: Visual fields are full to confrontation.          III, IV, VI: EOMI without nystagmus. PERRLA 3mm brisk b/l          V:  Facial sensation V1-V3 equal and intact           VII: Face is symmetric with normal eye closure and smile          VIII: Hearing is bilaterally intact to finger rub          IX, X: Uvula is midline and soft palate rises symmetrically          XI: Head turning and shoulder shrug are intact.          XII: Tongue protrudes midline     -Motor: Normal bulk and tone. B/L UE 5/5, RLE 3/5 and LLE 2/5     -Sensation: Unable to assess 2/2 AMS     -Coordination: Unable to perform 2/2 AMS     -Gait: Deferred    EEG Daily Summary (5/1/2025):    1)	Moderate generalized slowing suggestive of a similar degree of diffuse or multifocal  dysfunction.  2)	Frequent right> left temporal sharp wave discharges, improving.  3)	No seizures captured.  4)	Atrial tachycardia rhythm noted at roughly midnight with -130 bpm, subsequently patient was transferred to telemetry unit for further monitoring.    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 Collateral obtained from daughter and family friend at bedside. They reported that patient had a generalized toniconic seizure 3-minute duration on 4/24 aborted with midazolam intranasal. However, since then she has had episodes of staring near daily. It is unclear how many times a day it occurs.    The patient's mental status (baseline waxing and waning or w/ AMS) was getting worse with the daily staring episodes. Her mental status gradually declined over the past year whereas she was able to perform daily chores and ambulates independently, but now requires a rolling walker. Also, was able to feed herself, but required assistance on 4/28 that prompted a call to her neurologist. Patient's daughter reached out to Dr Najjar's office (outpatient neurologist), and was advised to present to ED if patient does not return to baseline. Of note, patient had an upcoming appointment with Dr Najjar on 4/29. Upon admission to [facility], CT of the abdomen/pelvis was obtained in ED that showed No bowel obstruction, but rectum was filled with multiple small stool balls as well as there was moderate right colonic stool burden, and signs of Colonic diverticulosis without diverticulitis. Given these findings as well as ongoing nausea and vomiting, GI was consulted. Patient was provided multiple enemas, and ultimately disimpaction that led to significant improvement with vomiting, and the nausea was managed with zofran. Patient had no further episodes or complaints of nausea and vomiting as of 4/30, and GI recommendations were followed to provide protonix daily, miralax BID, and obtain colonoscopy outpatient as there were previous recommendations to repeat in 2023 following the initial one on 11/10/20 that showed 6 small polyps that were removed. The explanation provided for nausea and vomiting was most likely due to a motility issue from moderate stool burden on CT scan. Psychiatry was consulted for agitation/delirium, and appreciate recommendations to start zyprexa PRN with standing dose in evening, as well as lexapro. Speech and swallow evaluated patient, and recommended a pureed diet that patient has been tolerating. PT evaluated patient on 4/29, and recommended LUDIVINA, but as per  family is refusing those services and would prefer discharge to home with already arranged 24hrs care. There were 3 electrographic seizures captured on EEG of which some correlated with staring spells, and the most recent was on 4/29 at 5:30pm. Patient's seizure medication regimen has been adjusted, and will continue in an effort to taper off carbamazepine given reports of refusal to take PO meds at times. Instead Vimpat has been added, and so far managed seizures well to date.     Hospital course was complicated with atrial fibrillation on 5/1/25 around 12am, and cardiology was consulted, and recommendations provided to administer PO metoprolol tartrate 25mg BID with further adjustments made throughout the day. Also, eliquis was started and plan for TTE. Given the ongoing rapid atrial fibrillation with minimal improvements, patient was transferred to medicine service for further management.     EPILEPSY PROGRESS NOTE:  Patient seen and examined at bedside, and noted to be in rapid A-fibrillation with rate up to 140's for which metoprolol 2.5mg IVP was given.     REVIEW OF SYSTEMS:  CARDIOVASCULAR:  No chest pain, chest pressure or chest discomfort. No palpitations or edema. +AFIB noted on telemetry  RESPIRATORY:  No shortness of breath, cough or sputum.  GASTROINTESTINAL:  Previously w/ nausea and vomiting and severely constipated.  GENITOURINARY: No Burning on urination.   NEUROLOGICAL:  see HPI  MUSCULOSKELETAL:  B/L Knee and ankle pain  HEMATOLOGIC:  No anemia, bleeding or bruising.  LYMPHATICS:  No enlarged nodes. No history of splenectomy.  PSYCHIATRIC:  No history of depression or anxiety.  ENDOCRINOLOGIC:  No reports of sweating, cold or heat intolerance. No polyuria or polydipsia.  ALLERGIES:  No history of asthma, hives, eczema or rhinitis.    MEDICATIONS  (STANDING):  bacitracin   Ointment 1 Application(s) Topical three times a day  carBAMazepine Chewable 450 milliGRAM(s) Chew <User Schedule>  enoxaparin Injectable 40 milliGRAM(s) SubCutaneous every 24 hours  escitalopram 5 milliGRAM(s) Oral <User Schedule>  lacosamide IVPB 100 milliGRAM(s) IV Intermittent <User Schedule>  melatonin 5 milliGRAM(s) Oral at bedtime  metoprolol tartrate 25 milliGRAM(s) Oral two times a day  OLANZapine Injectable 5 milliGRAM(s) IntraMuscular <User Schedule>  pantoprazole  Injectable 40 milliGRAM(s) IV Push daily  polyethylene glycol 3350 17 Gram(s) Oral two times a day  sodium chloride 0.9%. 1000 milliLiter(s) (100 mL/Hr) IV Continuous <Continuous>  valproate sodium   IVPB 1000 milliGRAM(s) IV Intermittent every 12 hours    MEDICATIONS  (PRN):  benzocaine 20% Spray 1 Spray(s) Topical four times a day PRN sore throat  bisacodyl 5 milliGRAM(s) Oral every 12 hours PRN Constipation  bisacodyl Suppository 10 milliGRAM(s) Rectal daily PRN Constipation  LORazepam   Injectable 2 milliGRAM(s) IV Push once PRN Seizure Activity  OLANZapine 2.5 milliGRAM(s) Oral once PRN if patient very agitated  OLANZapine Injectable 2.5 milliGRAM(s) IntraMuscular every 12 hours PRN Agitation  OLANZapine Injectable 5 milliGRAM(s) IntraMuscular every 8 hours PRN agitation  ondansetron Injectable 4 milliGRAM(s) IV Push every 8 hours PRN Nausea and/or Vomiting    VITAL SIGNS:  T(C): 36.3 (05-01-25 @ 05:24), Max: 37.4 (04-30-25 @ 20:40)  HR: 118 (05-01-25 @ 08:24) (73 - 126)  BP: 141/80 (05-01-25 @ 08:24) (94/60 - 161/78)  RR: 21 (05-01-25 @ 08:24) (18 - 21)  SpO2: 95% (05-01-25 @ 08:24) (91% - 98%)  Wt(kg): --    PHYSICAL EXAM:  Neurologic:     -Mental Status: Alert and oriented to name. [facility], but disoriented to date instead provided accurate date of birth. Speech is fluent with intact naming, repetition, no dysarthria or aphasia noted. Follows some simple commands, but indirectly confused (will attempt to respond with sentences that is unrelated to current topic). State that the red light on finger sensor is blood.      -Cranial Nerves:          II: Visual fields are full to confrontation.          III, IV, VI: EOMI without nystagmus. PERRLA 3mm brisk b/l          V:  Facial sensation V1-V3 equal and intact           VII: Face is symmetric with normal eye closure and smile          VIII: Hearing is bilaterally intact to finger rub          IX, X: Uvula is midline and soft palate rises symmetrically          XI: Head turning and shoulder shrug are intact.          XII: Tongue protrudes midline     -Motor: Normal bulk and tone. B/L UE 5/5, RLE 3/5 and LLE 2/5     -Sensation: Unable to assess 2/2 AMS     -Coordination: Unable to perform 2/2 AMS     -Gait: Deferred    EEG Daily Summary (5/1/2025):    1)	Moderate generalized slowing suggestive of a similar degree of diffuse or multifocal  dysfunction.  2)	Frequent right> left temporal sharp wave discharges, improving.  3)	No seizures captured.  4)	Atrial tachycardia rhythm noted at roughly midnight with -130 bpm, subsequently patient was transferred to telemetry unit for further monitoring.    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 Collateral obtained from daughter and family friend at bedside. They reported that patient had a generalized toniconic seizure 3-minute duration on 4/24 aborted with midazolam intranasal. However, since then she has had episodes of staring near daily. It is unclear how many times a day it occurs.    The patient's mental status (baseline waxing and waning or w/ AMS) was getting worse with the daily staring episodes. Her mental status gradually declined over the past year whereas she was able to perform daily chores and ambulates independently, but now requires a rolling walker. Also, was able to feed herself, but required assistance on 4/28 that prompted a call to her neurologist. Patient's daughter reached out to Dr Najjar's office (outpatient neurologist), and was advised to present to ED if patient does not return to baseline. Of note, patient had an upcoming appointment with Dr Najjar on 4/29. Upon admission to [facility], CT of the abdomen/pelvis was obtained in ED that showed No bowel obstruction, but rectum was filled with multiple small stool balls as well as there was moderate right colonic stool burden, and signs of Colonic diverticulosis without diverticulitis. Given these findings as well as ongoing nausea and vomiting, GI was consulted. Patient was provided multiple enemas, and ultimately disimpaction that led to significant improvement with vomiting, and the nausea was managed with zofran. Patient had no further episodes or complaints of nausea and vomiting as of 4/30, and GI recommendations were followed to provide protonix daily, miralax BID, and obtain colonoscopy outpatient as there were previous recommendations to repeat in 2023 following the initial one on 11/10/20 that showed 6 small polyps that were removed. The explanation provided for nausea and vomiting was most likely due to a motility issue from moderate stool burden on CT scan. Psychiatry was consulted for agitation/delirium, and appreciate recommendations to start zyprexa PRN with standing dose in evening, as well as lexapro. Speech and swallow evaluated patient, and recommended a pureed diet that patient has been tolerating. PT evaluated patient on 4/29, and recommended LUDIVINA, but as per  family is refusing those services and would prefer discharge to home with already arranged 24hrs care. There were 3 electrographic seizures captured on EEG of which some correlated with staring spells, and the most recent was on 4/29 at 5:30pm. Patient's seizure medication regimen has been adjusted, and will continue in an effort to taper off carbamazepine given reports of refusal to take PO meds at times. Instead Vimpat has been added, and so far managed seizures well to date.     Hospital course was complicated with atrial fibrillation on 5/1/25 around 12am, and cardiology was consulted, and recommendations provided to administer PO metoprolol tartrate 25mg BID with further adjustments made throughout the day. Also, eliquis was started and plan for TTE. Given the ongoing rapid atrial fibrillation with minimal improvements, patient was transferred to medicine service for further management.     EPILEPSY PROGRESS NOTE:  Patient seen and examined at bedside, and noted to be in rapid A-fibrillation with rate up to 140's for which metoprolol 2.5mg IVP was given.     REVIEW OF SYSTEMS:  CARDIOVASCULAR:  No chest pain, chest pressure or chest discomfort. No palpitations or edema. +AFIB noted on telemetry  RESPIRATORY:  No shortness of breath, cough or sputum.  GASTROINTESTINAL:  Previously w/ nausea and vomiting and severely constipated.  GENITOURINARY: No Burning on urination.   NEUROLOGICAL:  see HPI  MUSCULOSKELETAL:  B/L Knee and ankle pain  HEMATOLOGIC:  No anemia, bleeding or bruising.  LYMPHATICS:  No enlarged nodes. No history of splenectomy.  PSYCHIATRIC:  No history of depression or anxiety.  ENDOCRINOLOGIC:  No reports of sweating, cold or heat intolerance. No polyuria or polydipsia.  ALLERGIES:  No history of asthma, hives, eczema or rhinitis.    MEDICATIONS  (STANDING):  bacitracin   Ointment 1 Application(s) Topical three times a day  carBAMazepine Chewable 450 milliGRAM(s) Chew <User Schedule>  enoxaparin Injectable 40 milliGRAM(s) SubCutaneous every 24 hours  escitalopram 5 milliGRAM(s) Oral <User Schedule>  lacosamide IVPB 100 milliGRAM(s) IV Intermittent <User Schedule>  melatonin 5 milliGRAM(s) Oral at bedtime  metoprolol tartrate 25 milliGRAM(s) Oral two times a day  OLANZapine Injectable 5 milliGRAM(s) IntraMuscular <User Schedule>  pantoprazole  Injectable 40 milliGRAM(s) IV Push daily  polyethylene glycol 3350 17 Gram(s) Oral two times a day  sodium chloride 0.9%. 1000 milliLiter(s) (100 mL/Hr) IV Continuous <Continuous>  valproate sodium   IVPB 1000 milliGRAM(s) IV Intermittent every 12 hours    MEDICATIONS  (PRN):  benzocaine 20% Spray 1 Spray(s) Topical four times a day PRN sore throat  bisacodyl 5 milliGRAM(s) Oral every 12 hours PRN Constipation  bisacodyl Suppository 10 milliGRAM(s) Rectal daily PRN Constipation  LORazepam   Injectable 2 milliGRAM(s) IV Push once PRN Seizure Activity  OLANZapine 2.5 milliGRAM(s) Oral once PRN if patient very agitated  OLANZapine Injectable 2.5 milliGRAM(s) IntraMuscular every 12 hours PRN Agitation  OLANZapine Injectable 5 milliGRAM(s) IntraMuscular every 8 hours PRN agitation  ondansetron Injectable 4 milliGRAM(s) IV Push every 8 hours PRN Nausea and/or Vomiting    VITAL SIGNS:  T(C): 36.3 (05-01-25 @ 05:24), Max: 37.4 (04-30-25 @ 20:40)  HR: 118 (05-01-25 @ 08:24) (73 - 126)  BP: 141/80 (05-01-25 @ 08:24) (94/60 - 161/78)  RR: 21 (05-01-25 @ 08:24) (18 - 21)  SpO2: 95% (05-01-25 @ 08:24) (91% - 98%)  Wt(kg): --    PHYSICAL EXAM:  Neurologic:     -Mental Status: Alert and oriented to name. [facility], but disoriented to date instead provided accurate date of birth. Speech is fluent with intact naming, repetition, no dysarthria or aphasia noted. Follows some simple commands, but indirectly confused (will attempt to respond with sentences that is unrelated to current topic). State that the red light on finger sensor is blood.      -Cranial Nerves:          II: Visual fields are full to confrontation.          III, IV, VI: EOMI without nystagmus. PERRLA 3mm brisk b/l          V:  Facial sensation V1-V3 equal and intact           VII: Face is symmetric with normal eye closure and smile          VIII: Hearing is bilaterally intact to finger rub          IX, X: Uvula is midline and soft palate rises symmetrically          XI: Head turning and shoulder shrug are intact.          XII: Tongue protrudes midline     -Motor: Normal bulk and tone. B/L UE 5/5, RLE 3/5 and LLE 2/5     -Sensation: Unable to assess 2/2 AMS     -Coordination: Unable to perform 2/2 AMS     -Gait: Deferred    EEG Daily Summary (5/1/2025):    1)	Moderate generalized slowing suggestive of a similar degree of diffuse or multifocal  dysfunction.  2)	Frequent right> left temporal sharp wave discharges, improving.  3)	No seizures captured.  4)	Atrial tachycardia rhythm noted at roughly midnight with -130 bpm, subsequently patient was transferred to telemetry unit for further monitoring.    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 Collateral obtained from daughter and family friend at bedside. They reported that patient had a generalized toniconic seizure 3-minute duration on 4/24 aborted with midazolam intranasal. However, since then she has had episodes of staring near daily. It is unclear how many times a day it occur and if it is seizured related or not.    The patient's mental status (baseline waxing and waning or w/ AMS) was getting worse with the daily staring episodes. Her mental status gradually declined over the past year whereas she was able to perform daily chores and ambulates independently, but now requires a rolling walker. Also, was able to feed herself, but required assistance on 4/28 that prompted a call to her neurologist. Patient's daughter reached out to Dr Najjar's office (outpatient neurologist), and was advised to present to ED if patient does not return to baseline. Of note, patient had an upcoming appointment with Dr Najjar on 4/29.     Upon admission to [facility], CT of the abdomen/pelvis was obtained in ED that showed No bowel obstruction, but rectum was filled with multiple small stool balls as well as there was moderate right colonic stool burden, and signs of Colonic diverticulosis without diverticulitis. Given these findings as well as ongoing nausea and vomiting, GI was consulted. Patient was provided multiple enemas, and ultimately disimpaction that led to significant improvement with vomiting, and the nausea was managed with zofran. Patient had no further episodes or complaints of nausea and vomiting as of 4/30, and GI recommendations were followed to provide protonix daily, miralax BID, and obtain colonoscopy outpatient as there were previous recommendations to repeat in 2023 following the initial one on 11/10/20 that showed 6 small polyps that were removed.     The explanation provided for nausea and vomiting was most likely due to a motility issue from moderate stool burden on CT scan. Psychiatry was consulted for agitation/delirium, and appreciate recommendations to start zyprexa PRN with standing dose in evening, as well as lexapro. Speech and swallow evaluated patient, and recommended a pureed diet that patient has been tolerating. PT evaluated patient on 4/29, and recommended LUDIVINA, but as per  family is refusing those services and would prefer discharge to home with already arranged 24hrs care. There were 3 electrographic seizures captured on EEG of which some correlated with staring spells, and the most recent was on 4/29 at 5:30pm. Patient's seizure medication regimen has been adjusted to primarily Vimpat and Depakote, and will continue taper off carbamazepine given reports of refusal to take PO meds at times. EEG has improved in last ~ 48 hrs with less frequent discharges and no seizures.     Hospital course was complicated with atrial fibrillation on 5/1/25 around 12am, and cardiology was consulted, and recommendations provided to administer PO metoprolol tartrate 25mg BID with further adjustments made throughout the day. Also, eliquis was started and plan for TTE. Given the ongoing rapid atrial fibrillation with minimal improvements with PRN interventions, patient was transferred to medicine service for further management with epillepsy continuing to closely follow.     EPILEPSY PROGRESS NOTE:  Patient seen and examined at bedside, and noted to be in rapid A-fibrillation with rate up to 140's for which metoprolol 2.5mg IVP was given.     REVIEW OF SYSTEMS:  CARDIOVASCULAR:  No chest pain, chest pressure or chest discomfort. No palpitations or edema. +AFIB noted on telemetry  RESPIRATORY:  No shortness of breath, cough or sputum.  GASTROINTESTINAL:  Previously w/ nausea and vomiting and severely constipated.  GENITOURINARY: No Burning on urination.   NEUROLOGICAL:  see HPI  MUSCULOSKELETAL:  B/L Knee and ankle pain  HEMATOLOGIC:  No anemia, bleeding or bruising.  LYMPHATICS:  No enlarged nodes. No history of splenectomy.  PSYCHIATRIC:  No history of depression or anxiety.  ENDOCRINOLOGIC:  No reports of sweating, cold or heat intolerance. No polyuria or polydipsia.  ALLERGIES:  No history of asthma, hives, eczema or rhinitis.    MEDICATIONS  (STANDING):  bacitracin   Ointment 1 Application(s) Topical three times a day  carBAMazepine Chewable 450 milliGRAM(s) Chew <User Schedule>  enoxaparin Injectable 40 milliGRAM(s) SubCutaneous every 24 hours  escitalopram 5 milliGRAM(s) Oral <User Schedule>  lacosamide IVPB 100 milliGRAM(s) IV Intermittent <User Schedule>  melatonin 5 milliGRAM(s) Oral at bedtime  metoprolol tartrate 25 milliGRAM(s) Oral two times a day  OLANZapine Injectable 5 milliGRAM(s) IntraMuscular <User Schedule>  pantoprazole  Injectable 40 milliGRAM(s) IV Push daily  polyethylene glycol 3350 17 Gram(s) Oral two times a day  sodium chloride 0.9%. 1000 milliLiter(s) (100 mL/Hr) IV Continuous <Continuous>  valproate sodium   IVPB 1000 milliGRAM(s) IV Intermittent every 12 hours    MEDICATIONS  (PRN):  benzocaine 20% Spray 1 Spray(s) Topical four times a day PRN sore throat  bisacodyl 5 milliGRAM(s) Oral every 12 hours PRN Constipation  bisacodyl Suppository 10 milliGRAM(s) Rectal daily PRN Constipation  LORazepam   Injectable 2 milliGRAM(s) IV Push once PRN Seizure Activity  OLANZapine 2.5 milliGRAM(s) Oral once PRN if patient very agitated  OLANZapine Injectable 2.5 milliGRAM(s) IntraMuscular every 12 hours PRN Agitation  OLANZapine Injectable 5 milliGRAM(s) IntraMuscular every 8 hours PRN agitation  ondansetron Injectable 4 milliGRAM(s) IV Push every 8 hours PRN Nausea and/or Vomiting    VITAL SIGNS:  T(C): 36.3 (05-01-25 @ 05:24), Max: 37.4 (04-30-25 @ 20:40)  HR: 118 (05-01-25 @ 08:24) (73 - 126)  BP: 141/80 (05-01-25 @ 08:24) (94/60 - 161/78)  RR: 21 (05-01-25 @ 08:24) (18 - 21)  SpO2: 95% (05-01-25 @ 08:24) (91% - 98%)  Wt(kg): --    PHYSICAL EXAM:  Neurologic:     -Mental Status: Alert and oriented to name. [facility], but disoriented to date instead provided accurate date of birth. Speech is fluent with intact naming, repetition, no dysarthria or aphasia noted. Follows some simple commands, but indirectly confused (will attempt to respond with sentences that is unrelated to current topic). State that the red light on finger sensor is blood.      -Cranial Nerves:          II: Visual fields are full to confrontation.          III, IV, VI: EOMI without nystagmus. PERRLA 3mm brisk b/l          V:  Facial sensation V1-V3 equal and intact           VII: Face is symmetric with normal eye closure and smile          VIII: Hearing is bilaterally intact to finger rub          IX, X: Uvula is midline and soft palate rises symmetrically          XI: Head turning and shoulder shrug are intact.          XII: Tongue protrudes midline     -Motor: Normal bulk and tone. B/L UE 5/5, RLE 3/5 and LLE 2/5     -Sensation: Unable to assess 2/2 AMS     -Coordination: Unable to perform 2/2 AMS     -Gait: Deferred    EEG Daily Summary (5/1/2025):    1)	Moderate generalized slowing suggestive of a similar degree of diffuse or multifocal  dysfunction.  2)	Frequent right> left temporal sharp wave discharges, improving.  3)	No seizures captured.  4)	Atrial tachycardia rhythm noted at roughly midnight with -130 bpm, subsequently patient was transferred to telemetry unit for further monitoring.    Claudia Owens DO  Attending Neurologist, [facility] Epilepsy Program        Electronic Signatures:  Claudia Owens)  (Signed 01-May-2025 09:15)  	Authored: EEG REPORT

## 2025-05-01 NOTE — PROGRESS NOTE ADULT - PROBLEM SELECTOR PLAN 3
Hx of Alheimer's now agitated, and restless.     - trialed IM zyprexa 2.5mg in ED  - reoder home melatonin but 5mg instead of 10mg nightly as per Psych recs  - Psychiatry consult appreciated, and agreed with Dr Najjar's regimen below as well as to start Lexapro 5mg QD (to start on 5/2/2025).  -* reviewed regimen with Dr. Najjar who prefers standing zyprexa 5mg QHS at 6-7pm,  along with 2.5mg PRN agitation (up to 2 x in daytime). Hx of Alzheimer's now agitated, and restless.     - trialed IM zyprexa 2.5mg in ED  - reoder home melatonin but 5mg instead of 10mg nightly as per Psych recs  - Psychiatry consult appreciated, and agreed with Dr Najjar's regimen below as well as to start Lexapro 5mg QD (to start on 5/2/2025).  -* reviewed regimen with Dr. Najjar who prefers standing zyprexa 5mg QHS at 6-7pm,  along with 2.5mg PRN agitation (up to 2 x in daytime). Hx of Alzheimer's now intermittently agitated/restless.     - trialed IM zyprexa 2.5mg in ED  - reoder home melatonin but 5mg instead of 10mg nightly as per Psych recs  - Psychiatry consult appreciated, and agreed with Dr Najjar's regimen below as well as to start Lexapro 5mg QD (to start on 5/2/2025).  -* reviewed regimen with Dr. Najjar who prefers standing zyprexa 5mg QHS at 7pm,  along with 2.5mg PRN agitation (up to 2 x in daytime).

## 2025-05-01 NOTE — PROGRESS NOTE ADULT - PROBLEM SELECTOR PLAN 7
Nutrition & Prophylaxis:    F: NS 100ml/hr while NPO  E:  K>4, Mg>2, Phos >3  N: NPO except meds  DVT PPx: SCDs & Lovenox 40mg QD  GI PPx: Not indicated  Dispo: 7 [facility]  Code: FULL CODE.

## 2025-05-01 NOTE — BH CONSULTATION LIAISON PROGRESS NOTE - NSBHCHARTREVIEWVS_PSY_A_CORE FT
Vital Signs Last 24 Hrs  T(C): 36.8 (01 May 2025 13:34), Max: 37.4 (30 Apr 2025 20:40)  T(F): 98.2 (01 May 2025 13:34), Max: 99.4 (30 Apr 2025 20:40)  HR: 100 (01 May 2025 13:45) (73 - 126)  BP: 116/75 (01 May 2025 13:45) (73/51 - 150/85)  BP(mean): 90 (01 May 2025 13:45) (58 - 109)  RR: 21 (01 May 2025 13:45) (17 - 21)  SpO2: 97% (01 May 2025 13:45) (91% - 98%)    Parameters below as of 01 May 2025 13:45  Patient On (Oxygen Delivery Method): room air    
Vital Signs Last 24 Hrs  T(C): 36.4 (30 Apr 2025 13:05), Max: 37.2 (29 Apr 2025 18:05)  T(F): 97.6 (30 Apr 2025 13:05), Max: 98.9 (29 Apr 2025 18:05)  HR: 97 (30 Apr 2025 13:05) (89 - 123)  BP: 130/77 (30 Apr 2025 13:05) (93/59 - 161/78)  BP(mean): 105 (30 Apr 2025 03:15) (97 - 105)  RR: 18 (30 Apr 2025 13:05) (16 - 20)  SpO2: 97% (30 Apr 2025 13:05) (95% - 98%)    Parameters below as of 30 Apr 2025 13:05  Patient On (Oxygen Delivery Method): room air

## 2025-05-01 NOTE — PROGRESS NOTE ADULT - PROBLEM SELECTOR PLAN 1
Hx of seizures since 2003 following R temporal & frontal lobes resection. Admitted for uncontrolled nausea and vomiting, and new staring spell episodes that has been identified as seizure events on vEEG monitoring. 3 seizures have been captured so far for which 1mg ativan IV has been given. Last sz was on 4/29/25 at 5:36pm.     - Continue vEEG monitoring  - 4/29 Reduced Carbamazepine dose from 600mg TID to 450mg BID PO as per Dr. Najjar plans for eventual wean off.   - 4/30 Unable to administer lower dose of carbamazepine d/t lethargy from sedative med (zyprexa).   - 4/30 Vimpat was increased to 150mg BID (only PM dose was given then reduced back to 100mg BID in AM  - 5/1 Continue Vimpat 100mg BID due to patient's poor cooperation with taking oral carbamazepine  - Continue Depakote 1000mg IV  - AED levels: Carbamazepine 8.7 and VPA 72.7  on 4/28/25  - Ativan 2mg IVP PRN for seizures > 2mins  - Neurological & Vitals assessment Q8hrs  - Maintain Seizure/Fall/Aspiration precautions.

## 2025-05-01 NOTE — PROGRESS NOTE ADULT - ASSESSMENT
80F with PMH of brain tumor resection in 2003, alzheimer's dementia, seizures, breast cancer and hypertension, admitted to the epilepsy service with nausea, vomiting and worsening cognition with increased frequency of staring spell and overall decline.     #Seizures  #Dementia  - plan per epilepsy team  - currently on vEEG   - carbamazepine 450mg BID  - vimpat 100mg IV every 12 hours  - depakote 1000mg every 12 hours    #Nausea, vomiting, constipation  - large stool burden without obstruction seen on abdominal CT obtained in the ER  - stool seen in rectum as well - recommend suppository and/or enema  - per discussion with epilepsy NP, team planning on consulted GI - appreciate recommendations  - s/p disimpaction  - continue with miralax BID, enemas/suppositories per GI to ensure having regular bowel movements.    - symptoms have resolved   - likely will be able to undergo outpatient evaluation    #HTN  - can continue home antihypertensive regimen, with holding parameters    #Atrial tachycardia/?Atrial flutter  - cardiology consulted   - resume home dose of metoprolol for better rate control  - AC discussion per cardiology    50 minutes spent on this encounter, including face to face with patient, review of chart, care coordination and documentation.  Plan discussed with epilepsy team.

## 2025-05-01 NOTE — PROGRESS NOTE ADULT - SUBJECTIVE AND OBJECTIVE BOX
GASTROENTEROLOGY PROGRESS NOTE  Patient seen and examined at bedside.  tolerating diet well  denies anymore abd pain, N/V    PERTINENT REVIEW OF SYSTEMS:  CONSTITUTIONAL: No weakness, fevers or chills  HEENT: No visual changes; No vertigo or throat pain   GASTROINTESTINAL: As above.  NEUROLOGICAL: No numbness or weakness  SKIN: No itching, burning, rashes, or lesions     Allergies    Zonegran (Other)  Dilantin (Hives)  Bactrim (Unknown)  phenobarbital (Unknown)  MSG (Unknown)    Intolerances    antihistamines (Unknown)  Keppra (Unknown)  Fish Products (Unknown)  Neurontin (Unknown)  Topamax (Sedation/Somnol)  Trileptal (Nausea)  shellfish (Unknown)    MEDICATIONS:  MEDICATIONS  (STANDING):  apixaban 5 milliGRAM(s) Oral every 12 hours  bacitracin   Ointment 1 Application(s) Topical three times a day  carBAMazepine Chewable 450 milliGRAM(s) Chew <User Schedule>  escitalopram 5 milliGRAM(s) Oral <User Schedule>  lacosamide IVPB 100 milliGRAM(s) IV Intermittent <User Schedule>  losartan 50 milliGRAM(s) Oral daily  melatonin 5 milliGRAM(s) Oral at bedtime  metoprolol tartrate 37.5 milliGRAM(s) Oral every 12 hours  OLANZapine 5 milliGRAM(s) Oral every 24 hours  pantoprazole  Injectable 40 milliGRAM(s) IV Push daily  polyethylene glycol 3350 17 Gram(s) Oral two times a day  sodium chloride 0.9%. 1000 milliLiter(s) (100 mL/Hr) IV Continuous <Continuous>  valproate sodium   IVPB 1000 milliGRAM(s) IV Intermittent every 12 hours    MEDICATIONS  (PRN):  acetaminophen     Tablet .. 650 milliGRAM(s) Oral every 6 hours PRN Temp greater or equal to 38.5C (101.3F), Mild Pain (1 - 3)  benzocaine 20% Spray 1 Spray(s) Topical four times a day PRN sore throat  bisacodyl 5 milliGRAM(s) Oral every 12 hours PRN Constipation  bisacodyl Suppository 10 milliGRAM(s) Rectal daily PRN Constipation  LORazepam   Injectable 2 milliGRAM(s) IV Push once PRN Seizure Activity  ondansetron Injectable 4 milliGRAM(s) IV Push every 8 hours PRN Nausea and/or Vomiting    Vital Signs Last 24 Hrs  T(C): 36.7 (01 May 2025 17:46), Max: 37.4 (30 Apr 2025 20:40)  T(F): 98 (01 May 2025 17:46), Max: 99.4 (30 Apr 2025 20:40)  HR: 97 (01 May 2025 17:46) (73 - 126)  BP: 128/85 (01 May 2025 17:46) (73/51 - 150/85)  BP(mean): 102 (01 May 2025 17:46) (58 - 109)  RR: 20 (01 May 2025 17:46) (17 - 21)  SpO2: 97% (01 May 2025 17:46) (91% - 98%)    Parameters below as of 01 May 2025 17:46  Patient On (Oxygen Delivery Method): room air        04-30 @ 07:01 - 05-01 @ 07:00  --------------------------------------------------------  IN: 300 mL / OUT: 600 mL / NET: -300 mL    05-01 @ 07:01 - 05-01 @ 18:39  --------------------------------------------------------  IN: 1705 mL / OUT: 0 mL / NET: 1705 mL      PHYSICAL EXAM:  General: lying in bed, in no acute distress  HEENT: MMM, conjunctiva and sclera clear  Gastrointestinal: Soft non-tender non-distended; No rebound or guarding  Skin: Warm and dry. No obvious rash    LABS    Urinalysis with Rflx Culture (collected 28 Apr 2025 21:41)      RADIOLOGY & ADDITIONAL STUDIES:  Reviewed OBSERVATION

## 2025-05-01 NOTE — PROGRESS NOTE ADULT - PROBLEM SELECTOR PLAN 2
EKG with AT and 1st degree AVB first diagnosed in 2023. ECGs now showing AFL/AF. Known history of AT for which she is on metoprolol succinate 25mg QD. Has been in an out of flutter since admission. Home Toprol was held in favor of IV lopressor 5mg q12 and later 2.5mg q6h. During RVR episode in early AM of 5/1 25mg PO Lopressor given with rate now more in the 90s-110s. CHADSVASC 4-5. Dr. Cristofer Brownlee is cardiologist outpt.   - Lopressor 37.5mg BID PO, with uptitration to 50mg BID PO if needed   - Eliquis 5mg BID for AC  - Not a candidate for DCCV right now given paroxysmal nature and lack of AC prior, if rates becomes more difficult to control despite escalating PO regimen will consider WILBER/DCCV

## 2025-05-02 ENCOUNTER — TRANSCRIPTION ENCOUNTER (OUTPATIENT)
Age: 80
End: 2025-05-02

## 2025-05-02 PROCEDURE — 95720 EEG PHY/QHP EA INCR W/VEEG: CPT

## 2025-05-02 PROCEDURE — 99233 SBSQ HOSP IP/OBS HIGH 50: CPT

## 2025-05-02 PROCEDURE — 99232 SBSQ HOSP IP/OBS MODERATE 35: CPT

## 2025-05-02 PROCEDURE — 99233 SBSQ HOSP IP/OBS HIGH 50: CPT | Mod: GC

## 2025-05-02 RX ORDER — CARBAMAZEPINE 200 MG/1
200 TABLET ORAL EVERY 12 HOURS
Refills: 0 | Status: DISCONTINUED | OUTPATIENT
Start: 2025-05-02 | End: 2025-05-04

## 2025-05-02 RX ORDER — OLANZAPINE 10 MG/1
1 TABLET ORAL
Qty: 30 | Refills: 0
Start: 2025-05-02 | End: 2025-05-31

## 2025-05-02 RX ORDER — LACOSAMIDE 150 MG/1
100 TABLET, FILM COATED ORAL EVERY 12 HOURS
Refills: 0 | Status: DISCONTINUED | OUTPATIENT
Start: 2025-05-02 | End: 2025-05-04

## 2025-05-02 RX ORDER — APIXABAN 2.5 MG/1
1 TABLET, FILM COATED ORAL
Qty: 60 | Refills: 0
Start: 2025-05-02 | End: 2025-05-31

## 2025-05-02 RX ORDER — METOPROLOL SUCCINATE 50 MG/1
50 TABLET, EXTENDED RELEASE ORAL EVERY 12 HOURS
Refills: 0 | Status: DISCONTINUED | OUTPATIENT
Start: 2025-05-02 | End: 2025-05-03

## 2025-05-02 RX ORDER — LACOSAMIDE 150 MG/1
1 TABLET, FILM COATED ORAL
Qty: 60 | Refills: 0
Start: 2025-05-02 | End: 2025-05-31

## 2025-05-02 RX ORDER — CARBAMAZEPINE 200 MG/1
1 TABLET ORAL
Qty: 60 | Refills: 0
Start: 2025-05-02 | End: 2025-05-31

## 2025-05-02 RX ORDER — ESCITALOPRAM OXALATE 20 MG/1
1 TABLET ORAL
Qty: 30 | Refills: 0
Start: 2025-05-02 | End: 2025-05-31

## 2025-05-02 RX ADMIN — APIXABAN 5 MILLIGRAM(S): 2.5 TABLET, FILM COATED ORAL at 06:31

## 2025-05-02 RX ADMIN — Medication 650 MILLIGRAM(S): at 10:04

## 2025-05-02 RX ADMIN — POLYETHYLENE GLYCOL 3350 17 GRAM(S): 17 POWDER, FOR SOLUTION ORAL at 18:19

## 2025-05-02 RX ADMIN — LACOSAMIDE 100 MILLIGRAM(S): 150 TABLET, FILM COATED ORAL at 21:18

## 2025-05-02 RX ADMIN — OLANZAPINE 5 MILLIGRAM(S): 10 TABLET ORAL at 20:00

## 2025-05-02 RX ADMIN — Medication 5 MILLIGRAM(S): at 21:18

## 2025-05-02 RX ADMIN — METOPROLOL SUCCINATE 37.5 MILLIGRAM(S): 50 TABLET, EXTENDED RELEASE ORAL at 09:05

## 2025-05-02 RX ADMIN — Medication 60 MILLIGRAM(S): at 01:26

## 2025-05-02 RX ADMIN — ESCITALOPRAM OXALATE 5 MILLIGRAM(S): 20 TABLET ORAL at 09:05

## 2025-05-02 RX ADMIN — LOSARTAN POTASSIUM 50 MILLIGRAM(S): 100 TABLET, FILM COATED ORAL at 06:31

## 2025-05-02 RX ADMIN — Medication 1000 MILLIGRAM(S): at 14:42

## 2025-05-02 RX ADMIN — Medication 40 MILLIGRAM(S): at 10:51

## 2025-05-02 RX ADMIN — Medication 100 MILLILITER(S): at 01:27

## 2025-05-02 RX ADMIN — Medication 650 MILLIGRAM(S): at 09:04

## 2025-05-02 RX ADMIN — CARBAMAZEPINE 200 MILLIGRAM(S): 200 TABLET ORAL at 21:17

## 2025-05-02 RX ADMIN — POLYETHYLENE GLYCOL 3350 17 GRAM(S): 17 POWDER, FOR SOLUTION ORAL at 06:32

## 2025-05-02 RX ADMIN — LACOSAMIDE 120 MILLIGRAM(S): 150 TABLET, FILM COATED ORAL at 09:37

## 2025-05-02 RX ADMIN — APIXABAN 5 MILLIGRAM(S): 2.5 TABLET, FILM COATED ORAL at 18:19

## 2025-05-02 RX ADMIN — METOPROLOL SUCCINATE 50 MILLIGRAM(S): 50 TABLET, EXTENDED RELEASE ORAL at 21:17

## 2025-05-02 RX ADMIN — Medication 1 APPLICATION(S): at 13:23

## 2025-05-02 RX ADMIN — CARBAMAZEPINE 450 MILLIGRAM(S): 200 TABLET ORAL at 09:04

## 2025-05-02 RX ADMIN — Medication 1 APPLICATION(S): at 06:51

## 2025-05-02 NOTE — DISCHARGE NOTE PROVIDER - NSDCMRMEDTOKEN_GEN_ALL_CORE_FT
carBAMazepine 300 mg oral capsule, extended release: 2 cap(s) orally 3 times a day  divalproex sodium 500 mg oral delayed release tablet: 2 cap(s) orally 2 times a day  levOCARNitine 330 mg oral tablet: 2 tab(s) orally in the morning and at bedtime  levOCARNitine 330 mg oral tablet: 1 tab(s) orally once a day (in the afternoon)  LORazepam 1 mg oral tablet: 1 tab(s) orally once a day as needed for  breakthrough seizures can repeat if needed MDD: 2mg  melatonin 10 mg oral tablet: 1 tab(s) orally once a day (at bedtime)  metoprolol succinate 25 mg oral tablet, extended release: 1 tab(s) orally once a day  Nayzilam 5 mg/inh nasal spray: 1 spray(s) intranasally once a day as needed for  seizures lasting more than 3 minutes can repeat 10 minutes later if needed MDD: 10mg  olmesartan 20 mg oral tablet: 1 tab(s) orally once a day  Reclast 5 mg/100 mL intravenous solution: 5 milligram(s) intravenously yearly   apixaban 5 mg oral tablet: 1 tab(s) orally every 12 hours  carBAMazepine 200 mg oral tablet: 1 tab(s) orally every 12 hours MDD: 400mg  carBAMazepine 300 mg oral capsule, extended release: 2 cap(s) orally 3 times a day  divalproex sodium 500 mg oral delayed release tablet: 2 cap(s) orally 2 times a day  escitalopram 5 mg oral tablet: 1 tab(s) orally once a day MDD: 5mg  lacosamide 100 mg oral tablet: 1 tab(s) orally every 12 hours MDD: 200mg  levOCARNitine 330 mg oral tablet: 2 tab(s) orally in the morning and at bedtime  levOCARNitine 330 mg oral tablet: 1 tab(s) orally once a day (in the afternoon)  LORazepam 1 mg oral tablet: 1 tab(s) orally once a day as needed for  breakthrough seizures can repeat if needed MDD: 2mg  melatonin 10 mg oral tablet: 1 tab(s) orally once a day (at bedtime)  metoprolol succinate 25 mg oral tablet, extended release: 1 tab(s) orally once a day  Nayzilam 5 mg/inh nasal spray: 1 spray(s) intranasally once a day as needed for  seizures lasting more than 3 minutes can repeat 10 minutes later if needed MDD: 10mg  OLANZapine 5 mg oral tablet: 1 tab(s) orally every 24 hours  olmesartan 20 mg oral tablet: 1 tab(s) orally once a day  Reclast 5 mg/100 mL intravenous solution: 5 milligram(s) intravenously yearly   apixaban 5 mg oral tablet: 1 tab(s) orally every 12 hours  carBAMazepine 200 mg oral tablet: 1 tab(s) orally every 12 hours MDD: 400mg  divalproex sodium 500 mg oral delayed release tablet: 2 cap(s) orally 2 times a day  escitalopram 5 mg oral tablet: 1 tab(s) orally once a day MDD: 5mg  lacosamide 100 mg oral tablet: 1 tab(s) orally every 12 hours MDD: 200mg  levOCARNitine 330 mg oral tablet: 2 tab(s) orally in the morning and at bedtime  levOCARNitine 330 mg oral tablet: 1 tab(s) orally once a day (in the afternoon)  LORazepam 1 mg oral tablet: 1 tab(s) orally once a day as needed for  breakthrough seizures can repeat if needed MDD: 2mg  melatonin 10 mg oral tablet: 1 tab(s) orally once a day (at bedtime)  metoprolol tartrate 50 mg oral tablet: 1 tab(s) orally every 12 hours  Nayzilam 5 mg/inh nasal spray: 1 spray(s) intranasally once a day as needed for  seizures lasting more than 3 minutes can repeat 10 minutes later if needed MDD: 10mg  OLANZapine 5 mg oral tablet: 1 tab(s) orally every 24 hours  olmesartan 20 mg oral tablet: 1 tab(s) orally once a day  Reclast 5 mg/100 mL intravenous solution: 5 milligram(s) intravenously yearly  valproic acid 250 mg oral capsule: 4 cap(s) orally every 12 hours   apixaban 5 mg oral tablet: 1 tab(s) orally every 12 hours  carBAMazepine 200 mg oral tablet: 1 tab(s) orally every 12 hours MDD: 400mg  Cozaar 50 mg oral tablet: 1 tab(s) orally once a day  divalproex sodium 500 mg oral delayed release tablet: 2 cap(s) orally 2 times a day  escitalopram 5 mg oral tablet: 1 tab(s) orally once a day MDD: 5mg  lacosamide 100 mg oral tablet: 1 tab(s) orally every 12 hours MDD: 200mg  levOCARNitine 330 mg oral tablet: 2 tab(s) orally in the morning and at bedtime  levOCARNitine 330 mg oral tablet: 1 tab(s) orally once a day (in the afternoon)  LORazepam 1 mg oral tablet: 1 tab(s) orally once a day as needed for  breakthrough seizures can repeat if needed MDD: 2mg  melatonin 10 mg oral tablet: 1 tab(s) orally once a day (at bedtime)  metoprolol tartrate 50 mg oral tablet: 1 tab(s) orally every 12 hours  Nayzilam 5 mg/inh nasal spray: 1 spray(s) intranasally once a day as needed for  seizures lasting more than 3 minutes can repeat 10 minutes later if needed MDD: 10mg  OLANZapine 5 mg oral tablet: 1 tab(s) orally every 24 hours  Reclast 5 mg/100 mL intravenous solution: 5 milligram(s) intravenously yearly  valproic acid 250 mg oral capsule: 4 cap(s) orally every 12 hours   apixaban 5 mg oral tablet: 1 tab(s) orally every 12 hours  carBAMazepine 200 mg oral tablet: 1 tab(s) orally every 12 hours MDD: 400mg  divalproex sodium 500 mg oral delayed release tablet: 2 cap(s) orally 2 times a day  escitalopram 5 mg oral tablet: 1 tab(s) orally once a day MDD: 5mg  lacosamide 100 mg oral tablet: 1 tab(s) orally every 12 hours MDD: 200mg  levOCARNitine 330 mg oral tablet: 2 tab(s) orally in the morning and at bedtime  levOCARNitine 330 mg oral tablet: 1 tab(s) orally once a day (in the afternoon)  LORazepam 1 mg oral tablet: 1 tab(s) orally once a day as needed for  breakthrough seizures can repeat if needed MDD: 2mg  melatonin 10 mg oral tablet: 1 tab(s) orally once a day (at bedtime)  metoprolol tartrate 50 mg oral tablet: 1 tab(s) orally every 12 hours  Nayzilam 5 mg/inh nasal spray: 1 spray(s) intranasally once a day as needed for  seizures lasting more than 3 minutes can repeat 10 minutes later if needed MDD: 10mg  OLANZapine 5 mg oral tablet: 1 tab(s) orally every 24 hours  Reclast 5 mg/100 mL intravenous solution: 5 milligram(s) intravenously yearly  valproic acid 250 mg oral capsule: 4 cap(s) orally every 12 hours   apixaban 5 mg oral tablet: 1 tab(s) orally every 12 hours  carBAMazepine 200 mg oral tablet: 1 tab(s) orally every 12 hours MDD: 400mg  divalproex sodium 500 mg oral delayed release tablet: 2 cap(s) orally 2 times a day  escitalopram 5 mg oral tablet: 1 tab(s) orally once a day MDD: 5mg  lacosamide 100 mg oral tablet: 1 tab(s) orally every 12 hours MDD: 200mg  levOCARNitine 330 mg oral tablet: 2 tab(s) orally in the morning and at bedtime  levOCARNitine 330 mg oral tablet: 1 tab(s) orally once a day (in the afternoon)  LORazepam 1 mg oral tablet: 1 tab(s) orally once a day as needed for  breakthrough seizures can repeat if needed MDD: 2mg  melatonin 10 mg oral tablet: 1 tab(s) orally once a day (at bedtime)  metoprolol tartrate 50 mg oral tablet: 1 tab(s) orally every 12 hours  Nayzilam 5 mg/inh nasal spray: 1 spray(s) intranasally once a day as needed for  seizures lasting more than 3 minutes can repeat 10 minutes later if needed MDD: 10mg  OLANZapine 5 mg oral tablet: 1 tab(s) orally every 24 hours  Reclast 5 mg/100 mL intravenous solution: 5 milligram(s) intravenously yearly

## 2025-05-02 NOTE — PROGRESS NOTE ADULT - SUBJECTIVE AND OBJECTIVE BOX
HOSPITAL COURSE:     OVERNIGHT EVENTS:    SUBJECTIVE: Pt seen and evaluated bedside. _ .    ROS: otherwise negative      PHYSICAL EXAM:  Constitutional: resting comfortably in bed; NAD  Head: NC/AT  Eyes: EOMI, anicteric sclera  ENT: no nasal discharge; MMM  Neck: supple  Respiratory: CTA B/L; no W/R/R  Cardiac: RRR; no M/R/G  Gastrointestinal: soft, NT/ND; no rebound or guarding  Extremities: WWP, no clubbing or cyanosis; no peripheral edema  Musculoskeletal: NROM x4; no joint swelling, tenderness or erythema  Dermatologic: skin warm, dry and intact; no rashes, wounds, or scars  Neurologic: AAOx3; no focal deficits  Psychiatric: affect and characteristics of appearance, verbalizations, behaviors are appropriate        RADIOLOGY & ADDITIONAL TESTS: Reviewed   OVERNIGHT EVENTS: Jie    SUBJECTIVE: Pt seen and evaluated bedside A&Ox1 this AM, vEEG on. Pt reporting ongoing pain in her neck, worse with movement. Otherwise denies any chest pain, SOB, abdominal pain, N/V/D.    ROS: otherwise negative      PHYSICAL EXAM:  Constitutional: resting comfortably in bed; NAD. vEEG on  Head: NC/AT  Eyes: EOMI, anicteric sclera  ENT: no nasal discharge; MMM  Neck: supple, pain with passive flexion and rotation  Respiratory: CTA B/L; no W/R/R  Cardiac: RRR; no M/R/G  Gastrointestinal: soft, NT/ND; no rebound or guarding  Extremities: WWP, no clubbing or cyanosis  Musculoskeletal: NROM x4  Neurologic: AAOx1; no focal deficits  Psychiatric: affect and characteristics of appearance, verbalizations, behaviors are appropriate        RADIOLOGY & ADDITIONAL TESTS: Reviewed

## 2025-05-02 NOTE — PROGRESS NOTE ADULT - PROBLEM SELECTOR PLAN 2
EKG with AT and 1st degree AVB first diagnosed in 2023. ECGs now showing AFL/AF. Known history of AT for which she is on metoprolol succinate 25mg QD. Has been in an out of flutter since admission. Home Toprol was held in favor of IV lopressor 5mg q12 and later 2.5mg q6h. During RVR episode in early AM of 5/1 25mg PO Lopressor given with rate now more in the 90s-110s. CHADSVASC 4-5. Dr. Cristofer Brownlee is cardiologist outpt.   TTE 5/1: with severe L atrial enlargement, normal ventricular function  - Lopressor 37.5mg BID PO, with uptitration to 50mg BID PO if needed   - Eliquis 5mg BID for AC  - Not a candidate for DCCV right now given paroxysmal nature and lack of AC prior, if rates becomes more difficult to control despite escalating PO regimen will consider WILBER/DCCV

## 2025-05-02 NOTE — DISCHARGE NOTE PROVIDER - ATTENDING DISCHARGE PHYSICAL EXAMINATION:
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 Course c/b new diagnosis of AFIB/AFL with RVR. Started on AC with apixaban (interaction with AED cleared by epilepsy team) and up-titrated metoprolol tartrate to 50mg BID. Plan for discharge home and close outpatient follow up.

## 2025-05-02 NOTE — DISCHARGE NOTE PROVIDER - CARE PROVIDER_API CALL
Gretchen Carrera  NP in Family Health  [address] [address]  Phone: [phone]  Fax: [phone]  Scheduled Appointment: 05/05/2025 02:00 PM    Najjar, Souhel  Neurology  [address] [place] [address], [address] [place] [id number]  Phone: [phone]  Fax: [phone]  Established Patient  Scheduled Appointment: 05/14/2025 11:00 PM

## 2025-05-02 NOTE — EEG REPORT - NS EEG TEXT BOX
[facility] Department of Neurology  Epilepsy Monitoring Unit video-Electroencephalogram    Patient Name:	SUKHJINDER DIEGO    :	1945  MRN:	[id number]    Study Start Date/Time:  2025, 12:14:52 AM  Study End Date/Time: in progress    Referred by: Dr. Souhel Najjar    Brief Clinical History:  SUKHJINDER DIEGO is a 80 year old Female with epilepsy, nausea and vomiting; study performed to investigate for seizures or markers of epilepsy.  Diagnosis Code:   R56.9 convulsions/seizure    Pertinent Medications:  Carbamazepine, Depakote (IV Vimpat given in lieu of Carbamazepine due to vomiting)    Acquisition Details:  Electroencephalography was acquired using a minimum of 21 channels on an [employer] system v 9.3.1 with electrode placement according to the standard International 10-20 system following ACNS (American Clinical Neurophysiology Society) guidelines for Long-Term Video EEG monitoring.  Anterior temporal T1 and T2 electrodes were utilized whenever possible.   The [employer] automated spike & seizure detections were all reviewed in detail, in addition to extensive portions of raw EEG.  The live video was monitored continuously by trained technicians to identify events and specialty nurses trained in seizure management supervised the care of the patient in the epilepsy unit.      Daily Updates (from 07:00 am until 07:00 am):  Day 4  2025- 2025  Background:  continuous, with predominantly theta frequencies, overall frequencies are faster than seen in previous days.  Symmetry:  Occasional (1-9%) right temporal polymorphic delta slowing.   Posterior Dominant Rhythm:  symmetric, 7 Hz.  Organization: Rudimentary.  Voltage:  Normal (20+ uV)  Variability: Yes. 		Reactivity: Yes.  N2 sleep: Symmetric, synchronous spindles and K complexes.  Spontaneous Activity: Rare Left temporal sharp wave epileptiform discharges.  Periodic/rhythmic activity:  None.  Events:  No electrographic seizures.  Provocations:  Hyperventilation and Photic stimulation: was not performed.    Daily Summary:    -Improving mild to moderate generalized slowing suggestive of a similar degree of diffuse or multifocal  dysfunction with superimposed right temporal slowing/ focal cerebral dysfunction.  -Rare left temporal sharp wave discharges, improving.  -No seizures captured for ~ 72 hrs.    Claudia Owens DO  Attending Neurologist, [facility] Epilepsy [facility]

## 2025-05-02 NOTE — DISCHARGE NOTE PROVIDER - EXTENDED VTE YES NO FOR MLM ENOXAPARIN
no dysuria/chills, visual changes, ear pain, throat pain, CP, SOB, cough, back pain, neck pain, rash, HA/no fever/no nausea/no diarrhea/no hematuria/no vomiting ,

## 2025-05-02 NOTE — CHART NOTE - NSCHARTNOTEFT_GEN_A_CORE
Patient will need a hospital bed and gel overlay for home use. The patient has a medical condition which requires positioning of the body in ways not feasible with an ordinary bed. Patient’s head needs to be elevated 30 degrees to prevent aspirations and wedges and pillows have been tried and failed. Patient is unable to make frequent changes to body position on their own. The member can independently affect the adjustments by operating the controls.

## 2025-05-02 NOTE — DISCHARGE NOTE PROVIDER - PROVIDER TOKENS
PROVIDER:[TOKEN:[[id number]],SCHEDULEDAPPT:[05/05/2025],SCHEDULEDAPPTTIME:[02:00 PM]],PROVIDER:[TOKEN:[[id number]],SCHEDULEDAPPT:[05/14/2025],SCHEDULEDAPPTTIME:[11:00 PM],ESTABLISHEDPATIENT:[T]]

## 2025-05-02 NOTE — DISCHARGE NOTE PROVIDER - NSDCCPCAREPLAN_GEN_ALL_CORE_FT
PRINCIPAL DISCHARGE DIAGNOSIS  Diagnosis: Seizure  Assessment and Plan of Treatment: A seizure is abnormal electrical activity in the brain; the specific cause may or may not be found. Prior to a seizure you may experience a warning sensation (aura) that may include fear, nausea, dizziness, and visual changes such as flashing lights of spots. Common symptoms during the seizure may include an altered mental status, rhythmic jerking movements, drooling, grunting, loss of bladder or bowel control, or tongue biting. After a seizure, you may feel confused and sleepy.   Teach friends and family what to do if you HAVE a seizure which includes laying you on the ground with your head on a cushion and turning you to the side to keep your breathing passages clear in case of vomiting.  SEEK IMMEDIATE MEDICAL CARE IF YOU HAVE ANY OF THE FOLLOWING SYMPTOMS: seizure lasting over 5 minutes, not waking up or persistent altered mental status after the seizure, or more frequent or worsening seizures.  Medicine side effects  *Skin rash  *Fever of 100.4°F (38°C) or higher, or as directed by your provider  *Symptoms get worse or you have new symptoms      SECONDARY DISCHARGE DIAGNOSES  Diagnosis: Nausea and vomiting  Assessment and Plan of Treatment:     Diagnosis: New medication added  Assessment and Plan of Treatment: You were started on lacosamide 100mg every 12 hours. Lacosamide is used to treat partial-onset seizures. It is also used with other medicines to treat primary generalized tonic-clonic seizures. It acts on the central nervous system (CNS) to reduce the number and severity of seizures.  Lacosamide may cause side effects. Tell your doctor if any of these symptoms are severe or do not go away:  nausea, vomiting, diarrhea, blurred or double vision, uncontrollable eye movements, dizziness, headache, drowsiness, uncontrollable shaking of a part of the body, problems with coordination, balance, or walking, weakness, itching.     PRINCIPAL DISCHARGE DIAGNOSIS  Diagnosis: Seizure  Assessment and Plan of Treatment: A seizure is abnormal electrical activity in the brain; the specific cause may or may not be found. Prior to a seizure you may experience a warning sensation (aura) that may include fear, nausea, dizziness, and visual changes such as flashing lights of spots. Common symptoms during the seizure may include an altered mental status, rhythmic jerking movements, drooling, grunting, loss of bladder or bowel control, or tongue biting. After a seizure, you may feel confused and sleepy.   Teach friends and family what to do if you HAVE a seizure which includes laying you on the ground with your head on a cushion and turning you to the side to keep your breathing passages clear in case of vomiting.  SEEK IMMEDIATE MEDICAL CARE IF YOU HAVE ANY OF THE FOLLOWING SYMPTOMS: seizure lasting over 5 minutes, not waking up or persistent altered mental status after the seizure, or more frequent or worsening seizures.  Medicine side effects  *Skin rash  *Fever of 100.4°F (38°C) or higher, or as directed by your provider  *Symptoms get worse or you have new symptoms  ---------------------------------------------  -Please follow up with your Neurologist at the appointment listed for you   -Continue with Carbamazepine 200mg every 12 hours, Lacosamide 100mg every 12 hours, Depakote 1000mg every 12 hours         SECONDARY DISCHARGE DIAGNOSES  Diagnosis: Rapid atrial fibrillation  Assessment and Plan of Treatment: Atrial fibrillation is a type of irregular heartbeat (arrhythmia) where the heart quivers continuously in a chaotic pattern that makes the heart unable to pump blood normally. This can increase the risk for stroke, heart failure, and other heart-related conditions. Atrial fibrillation can be caused by a variety of conditions and may be temporary, intermittent, or permanent. Symptoms include feeling that your heart is beating rapidly or irregularly, chest discomfort, shortness of breath, or dizziness/lightheadedness that may be worse with exertion. Treatment is varied but may involve medication or electrical shock (cardioversion).  SEEK IMMEDIATE MEDICAL CARE IF YOU HAVE ANY OF THE FOLLOWING SYMPTOMS: chest pain, shortness of breath, abdominal pain, sweating, vomiting, blood in vomit/bowel movements/urine, dizziness/lightheadedness, weakness or numbness to face/arm/leg, trouble speaking or understanding, facial droop.  Eliquis increases your risk of severe or fatal bleeding, especially if you take certain medicines at the same time (including some over-the-counter medicines). It is very important to tell your doctor about all medicines you have recently used.  Call your doctor at once if you have signs of bleeding such as: swelling, pain, feeling very weak or dizzy, bleeding gums, nosebleeds, heavy menstrual periods or abnormal vaginal bleeding, blood in your urine, bloody or tarry stools, coughing up blood or vomit that looks like coffee grounds, or any bleeding that will not stop.  ----------------------------------------------------------  -Continue Metroplol 50mg twice a day  -Continue Eliquis 5mg Twice a day    Diagnosis: New medication added  Assessment and Plan of Treatment: You were started on lacosamide 100mg every 12 hours. Lacosamide is used to treat partial-onset seizures. It is also used with other medicines to treat primary generalized tonic-clonic seizures. It acts on the central nervous system (CNS) to reduce the number and severity of seizures.  Lacosamide may cause side effects. Tell your doctor if any of these symptoms are severe or do not go away:  nausea, vomiting, diarrhea, blurred or double vision, uncontrollable eye movements, dizziness, headache, drowsiness, uncontrollable shaking of a part of the body, problems with coordination, balance, or walking, weakness, itching.       PRINCIPAL DISCHARGE DIAGNOSIS  Diagnosis: Seizure  Assessment and Plan of Treatment: A seizure is abnormal electrical activity in the brain; the specific cause may or may not be found. Prior to a seizure you may experience a warning sensation (aura) that may include fear, nausea, dizziness, and visual changes such as flashing lights of spots. Common symptoms during the seizure may include an altered mental status, rhythmic jerking movements, drooling, grunting, loss of bladder or bowel control, or tongue biting. After a seizure, you may feel confused and sleepy.   Teach friends and family what to do if you HAVE a seizure which includes laying you on the ground with your head on a cushion and turning you to the side to keep your breathing passages clear in case of vomiting.  SEEK IMMEDIATE MEDICAL CARE IF YOU HAVE ANY OF THE FOLLOWING SYMPTOMS: seizure lasting over 5 minutes, not waking up or persistent altered mental status after the seizure, or more frequent or worsening seizures.  Medicine side effects  *Skin rash  *Fever of 100.4°F (38°C) or higher, or as directed by your provider  *Symptoms get worse or you have new symptoms  ---------------------------------------------  -Please follow up with your Neurologist at the appointment listed for you   -Continue with Carbamazepine 200mg every 12 hours, Lacosamide 100mg every 12 hours, Depakote 1000mg every 12 hours         SECONDARY DISCHARGE DIAGNOSES  Diagnosis: Rapid atrial fibrillation  Assessment and Plan of Treatment: Atrial fibrillation is a type of irregular heartbeat (arrhythmia) where the heart quivers continuously in a chaotic pattern that makes the heart unable to pump blood normally. This can increase the risk for stroke, heart failure, and other heart-related conditions. Atrial fibrillation can be caused by a variety of conditions and may be temporary, intermittent, or permanent. Symptoms include feeling that your heart is beating rapidly or irregularly, chest discomfort, shortness of breath, or dizziness/lightheadedness that may be worse with exertion. Treatment is varied but may involve medication or electrical shock (cardioversion).  SEEK IMMEDIATE MEDICAL CARE IF YOU HAVE ANY OF THE FOLLOWING SYMPTOMS: chest pain, shortness of breath, abdominal pain, sweating, vomiting, blood in vomit/bowel movements/urine, dizziness/lightheadedness, weakness or numbness to face/arm/leg, trouble speaking or understanding, facial droop.  Eliquis increases your risk of severe or fatal bleeding, especially if you take certain medicines at the same time (including some over-the-counter medicines). It is very important to tell your doctor about all medicines you have recently used.  Call your doctor at once if you have signs of bleeding such as: swelling, pain, feeling very weak or dizzy, bleeding gums, nosebleeds, heavy menstrual periods or abnormal vaginal bleeding, blood in your urine, bloody or tarry stools, coughing up blood or vomit that looks like coffee grounds, or any bleeding that will not stop.  ----------------------------------------------------------  -Continue Metroplol 50mg twice a day  -Continue Eliquis 5mg Twice a day    Diagnosis: Hypertension  Assessment and Plan of Treatment: Hypertension  Hypertension, commonly called high blood pressure, is when the force of blood pumping through your arteries is too strong. Hypertension forces your heart to work harder to pump blood. Your arteries may become narrow or stiff. Having untreated or uncontrolled hypertension for a long period of time can cause heart attack, stroke, kidney disease, and other problems. If started on a medication, take exactly as prescribed by your health care professional. Maintain a healthy lifestyle and follow up with your primary care physician.  SEEK IMMEDIATE MEDICAL CARE IF YOU HAVE ANY OF THE FOLLOWING SYMPTOMS: severe headache, confusion, chest pain, abdominal pain, vomiting, or shortness of breath.  -----------------------------------  -Continue with Losartan 50mg once a day   -Stop Omlesartan    Diagnosis: Alzheimer dementia with agitation  Assessment and Plan of Treatment: Continue with Zyprexa 5mg at bedtime  Continue with Lexapro 5mg once a day     PRINCIPAL DISCHARGE DIAGNOSIS  Diagnosis: Seizure  Assessment and Plan of Treatment: A seizure is abnormal electrical activity in the brain; the specific cause may or may not be found. Prior to a seizure you may experience a warning sensation (aura) that may include fear, nausea, dizziness, and visual changes such as flashing lights of spots. Common symptoms during the seizure may include an altered mental status, rhythmic jerking movements, drooling, grunting, loss of bladder or bowel control, or tongue biting. After a seizure, you may feel confused and sleepy.   Teach friends and family what to do if you HAVE a seizure which includes laying you on the ground with your head on a cushion and turning you to the side to keep your breathing passages clear in case of vomiting.  SEEK IMMEDIATE MEDICAL CARE IF YOU HAVE ANY OF THE FOLLOWING SYMPTOMS: seizure lasting over 5 minutes, not waking up or persistent altered mental status after the seizure, or more frequent or worsening seizures.  Medicine side effects  *Skin rash  *Fever of 100.4°F (38°C) or higher, or as directed by your provider  *Symptoms get worse or you have new symptoms  ---------------------------------------------  -Please follow up with your Neurologist at the appointment listed for you   -Continue with Carbamazepine 200mg every 12 hours, Lacosamide 100mg every 12 hours, Depakote 1000mg every 12 hours         SECONDARY DISCHARGE DIAGNOSES  Diagnosis: Rapid atrial fibrillation  Assessment and Plan of Treatment: Atrial fibrillation is a type of irregular heartbeat (arrhythmia) where the heart quivers continuously in a chaotic pattern that makes the heart unable to pump blood normally. This can increase the risk for stroke, heart failure, and other heart-related conditions. Atrial fibrillation can be caused by a variety of conditions and may be temporary, intermittent, or permanent. Symptoms include feeling that your heart is beating rapidly or irregularly, chest discomfort, shortness of breath, or dizziness/lightheadedness that may be worse with exertion. Treatment is varied but may involve medication or electrical shock (cardioversion).  SEEK IMMEDIATE MEDICAL CARE IF YOU HAVE ANY OF THE FOLLOWING SYMPTOMS: chest pain, shortness of breath, abdominal pain, sweating, vomiting, blood in vomit/bowel movements/urine, dizziness/lightheadedness, weakness or numbness to face/arm/leg, trouble speaking or understanding, facial droop.  Eliquis increases your risk of severe or fatal bleeding, especially if you take certain medicines at the same time (including some over-the-counter medicines). It is very important to tell your doctor about all medicines you have recently used.  Call your doctor at once if you have signs of bleeding such as: swelling, pain, feeling very weak or dizzy, bleeding gums, nosebleeds, heavy menstrual periods or abnormal vaginal bleeding, blood in your urine, bloody or tarry stools, coughing up blood or vomit that looks like coffee grounds, or any bleeding that will not stop.  ----------------------------------------------------------  -Continue Metroplol 50mg twice a day  -Continue Eliquis 5mg Twice a day    Diagnosis: Hypertension  Assessment and Plan of Treatment: Hypertension  Hypertension, commonly called high blood pressure, is when the force of blood pumping through your arteries is too strong. Hypertension forces your heart to work harder to pump blood. Your arteries may become narrow or stiff. Having untreated or uncontrolled hypertension for a long period of time can cause heart attack, stroke, kidney disease, and other problems. If started on a medication, take exactly as prescribed by your health care professional. Maintain a healthy lifestyle and follow up with your primary care physician.  SEEK IMMEDIATE MEDICAL CARE IF YOU HAVE ANY OF THE FOLLOWING SYMPTOMS: severe headache, confusion, chest pain, abdominal pain, vomiting, or shortness of breath.  -----------------------------------  -Continue with Losartan 25mg once a day   -Stop Omlesartan    Diagnosis: Alzheimer dementia with agitation  Assessment and Plan of Treatment: Continue with Zyprexa 5mg at bedtime  Continue with Lexapro 5mg once a day     PRINCIPAL DISCHARGE DIAGNOSIS  Diagnosis: Seizure  Assessment and Plan of Treatment: A seizure is abnormal electrical activity in the brain; the specific cause may or may not be found. Prior to a seizure you may experience a warning sensation (aura) that may include fear, nausea, dizziness, and visual changes such as flashing lights of spots. Common symptoms during the seizure may include an altered mental status, rhythmic jerking movements, drooling, grunting, loss of bladder or bowel control, or tongue biting. After a seizure, you may feel confused and sleepy.   Teach friends and family what to do if you HAVE a seizure which includes laying you on the ground with your head on a cushion and turning you to the side to keep your breathing passages clear in case of vomiting.  SEEK IMMEDIATE MEDICAL CARE IF YOU HAVE ANY OF THE FOLLOWING SYMPTOMS: seizure lasting over 5 minutes, not waking up or persistent altered mental status after the seizure, or more frequent or worsening seizures.  Medicine side effects  *Skin rash  *Fever of 100.4°F (38°C) or higher, or as directed by your provider  *Symptoms get worse or you have new symptoms  ---------------------------------------------  -Please follow up with your Neurologist at the appointment listed for you   -Continue with Carbamazepine 200mg every 12 hours, Lacosamide 100mg every 12 hours, Depakote 1000mg every 12 hours         SECONDARY DISCHARGE DIAGNOSES  Diagnosis: Rapid atrial fibrillation  Assessment and Plan of Treatment: Atrial fibrillation is a type of irregular heartbeat (arrhythmia) where the heart quivers continuously in a chaotic pattern that makes the heart unable to pump blood normally. This can increase the risk for stroke, heart failure, and other heart-related conditions. Atrial fibrillation can be caused by a variety of conditions and may be temporary, intermittent, or permanent. Symptoms include feeling that your heart is beating rapidly or irregularly, chest discomfort, shortness of breath, or dizziness/lightheadedness that may be worse with exertion. Treatment is varied but may involve medication or electrical shock (cardioversion).  SEEK IMMEDIATE MEDICAL CARE IF YOU HAVE ANY OF THE FOLLOWING SYMPTOMS: chest pain, shortness of breath, abdominal pain, sweating, vomiting, blood in vomit/bowel movements/urine, dizziness/lightheadedness, weakness or numbness to face/arm/leg, trouble speaking or understanding, facial droop.  Eliquis increases your risk of severe or fatal bleeding, especially if you take certain medicines at the same time (including some over-the-counter medicines). It is very important to tell your doctor about all medicines you have recently used.  Call your doctor at once if you have signs of bleeding such as: swelling, pain, feeling very weak or dizzy, bleeding gums, nosebleeds, heavy menstrual periods or abnormal vaginal bleeding, blood in your urine, bloody or tarry stools, coughing up blood or vomit that looks like coffee grounds, or any bleeding that will not stop.  ----------------------------------------------------------  -Continue Metroplol 50mg twice a day  -Continue Eliquis 5mg Twice a day    Diagnosis: Hypertension  Assessment and Plan of Treatment: Hypertension  Hypertension, commonly called high blood pressure, is when the force of blood pumping through your arteries is too strong. Hypertension forces your heart to work harder to pump blood. Your arteries may become narrow or stiff. Having untreated or uncontrolled hypertension for a long period of time can cause heart attack, stroke, kidney disease, and other problems. If started on a medication, take exactly as prescribed by your health care professional. Maintain a healthy lifestyle and follow up with your primary care physician.  SEEK IMMEDIATE MEDICAL CARE IF YOU HAVE ANY OF THE FOLLOWING SYMPTOMS: severe headache, confusion, chest pain, abdominal pain, vomiting, or shortness of breath.  -----------------------------------  -We stopped Losartan 25mg QD given  low BP's  -Stop Omlesartan    Diagnosis: Alzheimer dementia with agitation  Assessment and Plan of Treatment: Continue with Zyprexa 5mg at bedtime  Continue with Lexapro 5mg once a day

## 2025-05-02 NOTE — PROGRESS NOTE ADULT - ASSESSMENT
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 Admitted to neurology service for further workup. Cardiology consulted for AT/Atrial flutter.     Review of Studies:  TTE 5/1/25: nl biV function, severely dilated LA, moderate MAC, mild MR, mild pHTN PASP 44, trace effusion  EKG 4/30/2025 21:48: ? flutter variable AV block,   EKG 4/30/2025 21:47: ? flutter with 2:1 conduction vs AT  EKG 4/30/2025 6:54 am: Regular, narrow complex rhythm difficult to discern p waves. HR 96  EKG 4/29/2025 17:56: Atrial fib vs flutter, HR 92  EKG 4/29/2025 04:26: Sinus tachycardia, , RAD  EKG 4/28/2025: NSR HR 76 with PVCs, incomplete RBBB  TTE 12-: Normal LV size and contractility. Grade II DD. Severe enlargement of LA. Mildly calcified aortic valve with near normal motion and mild AI. Mitral annular calcification. Myxomatous leaflets with posterior leaflet prolapse and mild/moderate MR. RA enlarged. Mild moderate TR and mild pulmonary hypertension, PASP 46 mmHg.    Home Medications: Metoprolol Succinate 25 mg QD, Olmesartan 20mg QD  Cardiologist: Dr. Cristofer Brownlee     # Atrial Tachycardia/Fib/Flutter  EKG with AT and 1st degree AVB first diagnosed in 2023. EKG at present appear similar vs aflutter.   Known history of AT for which she is on metoprolol succinate 25mg QD. Has been in an out of flutter since admission.   Home Toprol was held in favor of IV lopressor 5mg q12 and later 2.5mg q6h which are not equivalent to her home dose of 25 qd.   On examination patient is back in a regular, rate controlled sinus rhythm with HR ~ 70-80.   Bedside echo performed with preserved EF, appears similar to reports from previous echo in 2023.   - CHADSVASC at least 4, continue eliquis 5mg BID  - increase lopressor to 50mg BID  - if rates become uncontrolled, check bmp and mag; replete K>4, mag >2; if Cr wnl and rates persistently > 110 can dig load with 500mcg x 1 followed by 250mcg 6 hours late (if rates remain rapid after first dose) for a load of 750mcg    #HTN  - continue losartan 50mg qd    patient seen and case discussed with Dr. Fournier

## 2025-05-02 NOTE — DISCHARGE NOTE PROVIDER - NSDCFUADDINST_GEN_ALL_CORE_FT
Seizure Safety Instructions  1. [facility] law mandates you to self-report your seizure disorder to [facility], and be seizure-free for 1yr before you can drive.   2. Avoid swimming, diving, taking a bath, cooking, use of motarized machineries.  3. Avoid climbing a ladder, trees or any height.  4. Use machines with safety switches.  5. Always be aware of your surroundings and make sure family and friends are aware of your seizures.  6. Use non-breakable dishes.  7. Consider wearing a medical bracelet to inform people you have epilepsy in case of an emergency.

## 2025-05-02 NOTE — PROGRESS NOTE ADULT - ASSESSMENT
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     Patient had 3 electrographic seizures on 4/29, one which correlated with her staring spells witnessed by daughters at home. Last seizure was 5:36 pm on 4/29/25 and sucessfully aborted with addition of Vimpat. Now concerns for rapid atrial fibrillation that started overnight around 12am with multiple med adjustments for rate control, and improving. Vimpat has a lower risk for atrial fib/tachycardia but as a precaution, dose was decreased back to 100mg BID. Most likely etiology for new onset afib is patient's inconsistent beta blocker use and age-related factors along with prior history of atrial tachycardia.     Recommendations:    - Continue vEEG monitoring  - Please change Vimpat IV to PO 100mg Q12hrs  - Please change Depakote IV to PO 1000mg Q12hrs  - Please reduce carbamazepine 450mg BID to 200mg BID starting tonight  - Also in addition to meds above and will be the discharge doses, continue lexapro 5mg daily and zyprexa 5mg PO QHS (8pm) upon discharge.   - Follow-up appointments will be provided to patient for televisit with Dr Najjar's NP Gretchen Gaytan next week and in-person visit with Najjar in 2 weeks.   - Maintain Seizure/Fall/Aspiration precautions.   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     Patient had 3 electrographic seizures on 4/29, one which correlated with her staring spells witnessed by daughters at home. Last seizure was 5:36 pm on 4/29/25 and successfully aborted with addition of Vimpat. Now concerns for rapid atrial fibrillation that started overnight around 12am 5/1/2025 with multiple med adjustments for rate control, and improving. Vimpat has a lower risk for atrial fib/tachycardia but as a precaution, dose was decreased back to 100mg BID temporarily. Most likely etiology for new onset afib is patient's inconsistent beta blocker use (due to initial refusal of oral meds) and age-related factors along with prior history of atrial tachycardia.     Recommendations:    - Continue vEEG monitoring  - Please change Vimpat IV to PO 100mg Q12hrs  - Please change Depakote IV to PO 1000mg Q12hrs  - Please reduce carbamazepine 450mg BID to 200mg BID starting tonight  - Also , in addition to meds above, will be discharged on the following mood Rx regimen which has been working very well : continue lexapro 5mg daily and zyprexa 5mg PO QHS (8pm) upon discharge.   - Follow-up appointments will be provided to patient for televisit with Dr Najjar's NP Gretchen Gaytan next week (monday or tuesday) and in-person visit with Najjar in 2 weeks.   - Maintain Seizure/Fall/Aspiration precautions.   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     Patient had 3 electrographic seizures on 4/29, one which correlated with her staring spells witnessed by daughters at home. Last seizure was 5:36 pm on 4/29/25 and successfully aborted with addition of Vimpat. Now concerns for rapid atrial fibrillation that started overnight around 12am 5/1/2025 with multiple med adjustments for rate control, and improving. Vimpat has a lower risk for atrial fib/tachycardia but as a precaution, dose was decreased back to 100mg BID temporarily. Most likely etiology for new onset afib is patient's inconsistent beta blocker use (due to initial refusal of oral meds) and age-related factors along with prior history of atrial tachycardia.     Recommendations:    - Continue vEEG monitoring  - Please change Vimpat IV to PO 100mg Q12hrs  - Please change Depakote IV to PO 1000mg Q12hrs  - Please reduce carbamazepine 450mg BID to 200mg BID starting tonight  - Also , in addition to meds above, will be discharged on the following mood Rx regimen which has been working very well : continue lexapro 5mg daily and zyprexa 5mg PO QHS (8pm) upon discharge.   - Follow-up appointments will be provided to patient for televisit with Dr Najjar's NP Gretchen Gaytan on 5/5/25 at 2pm and in-person visit with Najjar on 5/14/25 at 11am   - Maintain Seizure/Fall/Aspiration precautions.

## 2025-05-02 NOTE — PROGRESS NOTE ADULT - NS ATTEND AMEND GEN_ALL_CORE FT
Pt seen and examined , I agree with above A+P, edited where appropriate
I agree with above  A+P, edited where appropriate. Case discussed with daughter at bedside and Dr. Najjar.
I agree with above A+P, edited where appropriate.  > 75 minutes of time today spent coordinating care with neurologist Dr. Najjar, Cardiology, Internal Medicine, communicating plan with daughter at bedside, review of EEG.

## 2025-05-02 NOTE — PROGRESS NOTE ADULT - SUBJECTIVE AND OBJECTIVE BOX
EPILEPSY CONSULT PROGRESS NOTE:  Patient seen and examined at bedside, and is much more alert, atrial fibrillation on telemetry but better controlled at rate of 110 or less. There were no staring spells or suspicious activity for seizures reported overnight as well as EEG without any electrographic seizures since 4/29. Dr Najjar at bedside, and provided detailed plan in anticipation for discharge and follow-up. Daughter and  at bedside, and had no complaints. Daughter expresses concerns for levocarnitine 330mg (2 tabs - dissolvable) home regimen before meals (this provider reached out to pharmacy, and given only liquid version available, it will be provided by family members to administer to patient).     REVIEW OF SYSTEMS:  Limited due to baseline AMS, but also states mild neck pain.     MEDICATIONS  (STANDING):  apixaban 5 milliGRAM(s) Oral every 12 hours  bacitracin   Ointment 1 Application(s) Topical three times a day  carBAMazepine 200 milliGRAM(s) Oral every 12 hours  escitalopram 5 milliGRAM(s) Oral <User Schedule>  lacosamide 100 milliGRAM(s) Oral every 12 hours  losartan 50 milliGRAM(s) Oral daily  melatonin 5 milliGRAM(s) Oral at bedtime  metoprolol tartrate 37.5 milliGRAM(s) Oral every 12 hours  OLANZapine 5 milliGRAM(s) Oral every 24 hours  polyethylene glycol 3350 17 Gram(s) Oral two times a day  sodium chloride 0.9%. 1000 milliLiter(s) (100 mL/Hr) IV Continuous <Continuous>  valproic acid 1000 milliGRAM(s) Oral every 12 hours    MEDICATIONS  (PRN):  acetaminophen     Tablet .. 650 milliGRAM(s) Oral every 6 hours PRN Temp greater or equal to 38.5C (101.3F), Mild Pain (1 - 3)  benzocaine 20% Spray 1 Spray(s) Topical four times a day PRN sore throat  bisacodyl 5 milliGRAM(s) Oral every 12 hours PRN Constipation  bisacodyl Suppository 10 milliGRAM(s) Rectal daily PRN Constipation  LORazepam   Injectable 2 milliGRAM(s) IV Push once PRN Seizure Activity  ondansetron Injectable 4 milliGRAM(s) IV Push every 8 hours PRN Nausea and/or Vomiting    VITAL SIGNS:  T(C): 37.1 (05-02-25 @ 09:12), Max: 37.1 (05-02-25 @ 09:12)  HR: 109 (05-02-25 @ 08:18) (86 - 125)  BP: 127/81 (05-02-25 @ 08:18) (116/75 - 134/80)  RR: 20 (05-02-25 @ 08:18) (19 - 26)  SpO2: 97% (05-02-25 @ 08:18) (96% - 98%)  Wt(kg): --    PHYSICAL EXAM:  Neurologic:     -Mental Status: Alert and oriented to name. [facility], but disoriented to date instead provided accurate date of birth. Speech is fluent with intact naming, repetition, no dysarthria or aphasia noted. Follows some simple commands, but indirectly confused (will attempt to respond with sentences that is unrelated to current topic). State that the red light on finger sensor is blood.      -Cranial Nerves:          II: Visual fields are full to confrontation.          III, IV, VI: EOMI without nystagmus. PERRLA 3mm brisk b/l          V:  Facial sensation V1-V3 equal and intact           VII: Face is symmetric with normal eye closure and smile          VIII: Hearing is bilaterally intact to finger rub          IX, X: Uvula is midline and soft palate rises symmetrically          XI: Head turning and shoulder shrug are intact.          XII: Tongue protrudes midline     -Motor: Normal bulk and tone. B/L UE 5/5, RLE 3/5 and LLE 2/5     -Sensation: Unable to assess 2/2 AMS     -Coordination: Unable to perform 2/2 AMS     -Gait: Deferred    EEG Daily Summary (5/2/25):    -Improving mild to moderate generalized slowing suggestive of a similar degree of diffuse or multifocal  dysfunction with superimposed right temporal slowing/ focal cerebral dysfunction.  -Rare left temporal sharp wave discharges, improving.  -No seizures captured for ~ 72 hrs.    Claudia Owens DO  Attending Neurologist, [facility] Epilepsy Program    Electronic Signatures:  Claudia Owens)  (Signed 02-May-2025 10:35)  	Authored: EEG REPORT     EPILEPSY CONSULT PROGRESS NOTE:  Patient seen and examined at bedside, and is much more alert, atrial fibrillation on telemetry but better controlled at rate of 110 or less. There were no staring spells or suspicious activity for seizures reported overnight as well as EEG without any electrographic seizures since 4/29.     Dr Najjar at bedside, and provided detailed plan in anticipation for discharge and follow-up. Daughter and  at bedside, and had no complaints. Daughter expresses interest in resuming home dose levocarnitine 330mg (2 tabs - dissolvable) before meals (this provider reached out to pharmacy, and given only liquid version available, it will be provided by family members to administer to patient).     REVIEW OF SYSTEMS:  Limited due to baseline AMS, but also states mild neck pain.     MEDICATIONS  (STANDING):  apixaban 5 milliGRAM(s) Oral every 12 hours  bacitracin   Ointment 1 Application(s) Topical three times a day  carBAMazepine 200 milliGRAM(s) Oral every 12 hours  escitalopram 5 milliGRAM(s) Oral <User Schedule>  lacosamide 100 milliGRAM(s) Oral every 12 hours  losartan 50 milliGRAM(s) Oral daily  melatonin 5 milliGRAM(s) Oral at bedtime  metoprolol tartrate 37.5 milliGRAM(s) Oral every 12 hours  OLANZapine 5 milliGRAM(s) Oral every 24 hours  polyethylene glycol 3350 17 Gram(s) Oral two times a day  sodium chloride 0.9%. 1000 milliLiter(s) (100 mL/Hr) IV Continuous <Continuous>  valproic acid 1000 milliGRAM(s) Oral every 12 hours    MEDICATIONS  (PRN):  acetaminophen     Tablet .. 650 milliGRAM(s) Oral every 6 hours PRN Temp greater or equal to 38.5C (101.3F), Mild Pain (1 - 3)  benzocaine 20% Spray 1 Spray(s) Topical four times a day PRN sore throat  bisacodyl 5 milliGRAM(s) Oral every 12 hours PRN Constipation  bisacodyl Suppository 10 milliGRAM(s) Rectal daily PRN Constipation  LORazepam   Injectable 2 milliGRAM(s) IV Push once PRN Seizure Activity  ondansetron Injectable 4 milliGRAM(s) IV Push every 8 hours PRN Nausea and/or Vomiting    VITAL SIGNS:  T(C): 37.1 (05-02-25 @ 09:12), Max: 37.1 (05-02-25 @ 09:12)  HR: 109 (05-02-25 @ 08:18) (86 - 125)  BP: 127/81 (05-02-25 @ 08:18) (116/75 - 134/80)  RR: 20 (05-02-25 @ 08:18) (19 - 26)  SpO2: 97% (05-02-25 @ 08:18) (96% - 98%)  Wt(kg): --    PHYSICAL EXAM:  Neurologic:     -Mental Status: Alert and oriented to name. "[facility]", but disoriented to date instead provided accurate date of birth. Speech is fluent with intact naming, repetition, no dysarthria or aphasia noted. Follows some simple commands, but indirectly confused (will attempt to respond with sentences that is unrelated to current topic). State that the red light on finger sensor is blood.      -Cranial Nerves:          II: Visual fields are full to confrontation.          III, IV, VI: EOMI without nystagmus. PERRLA 3mm brisk b/l          V:  Facial sensation V1-V3 equal and intact           VII: Face is symmetric with normal eye closure and smile          VIII: Hearing is bilaterally intact to finger rub          IX, X: Uvula is midline and soft palate rises symmetrically          XI: Head turning and shoulder shrug are intact.          XII: Tongue protrudes midline     -Motor: Normal bulk and tone. B/L UE 5/5, RLE 3/5 and LLE 2/5     -Sensation: Unable to assess 2/2 AMS     -Coordination: Unable to perform 2/2 AMS     -Gait: Deferred    EEG Daily Summary (5/2/25):    -Improving mild to moderate generalized slowing suggestive of a similar degree of diffuse or multifocal  dysfunction with superimposed right temporal slowing/ focal cerebral dysfunction.  -Rare left temporal sharp wave discharges, improving.  -No seizures captured for ~ 72 hrs.    Claudia Owens DO  Attending Neurologist, [facility] Epilepsy Program    Electronic Signatures:  Claudia Owens)  (Signed 02-May-2025 10:35)  	Authored: EEG REPORT

## 2025-05-02 NOTE — DISCHARGE NOTE PROVIDER - CARE PROVIDERS DIRECT ADDRESSES
,DirectAddress_Unknown,[email or web address]

## 2025-05-02 NOTE — DISCHARGE NOTE PROVIDER - REASON FOR ADMISSION
nausea and vomiting Right ear hearing screen completed date: 2021  Right ear screen method: EOAE (evoked otoacoustic emission)  Right ear screen result: Passed  Right ear screen comment: N/A    Left ear hearing screen completed date: 2021  Left ear screen method: EOAE (evoked otoacoustic emission)  Left ear screen result: Passed  Left ear screen comments: N/A

## 2025-05-02 NOTE — DISCHARGE NOTE PROVIDER - NSDCFUSCHEDAPPT_GEN_ALL_CORE_FT
[facility]  GERIATRICS [address] [place] [address]   Scheduled Appointment: 05/12/2025    [facility]vall[facility]  [facility] [address] [place] [address]   Scheduled Appointment: 06/30/2025     Gretchen Carrera  [facility]  NEUROLOGY [address] [facility]  Scheduled Appointment: 05/05/2025    [facility]  GERIATRICS [address]   Scheduled Appointment: 05/12/2025    [facility]  GASTRO [address]  Scheduled Appointment: 05/15/2025    Matt Ovalles  [facility]  GERIATRICS [address]   Scheduled Appointment: 06/30/2025

## 2025-05-02 NOTE — PROGRESS NOTE ADULT - SUBJECTIVE AND OBJECTIVE BOX
Cardiology Consult      Interval History/HPI: Pt seen and examined at bedside. Agitated while being repositioned, but now calm. Asking what time and day it is.       OBJECTIVE  T(C): 36.9 (05-02-25 @ 14:38), Max: 37.1 (05-02-25 @ 09:12)  HR: 95 (05-02-25 @ 12:12) (86 - 125)  BP: 115/64 (05-02-25 @ 12:12) (115/64 - 134/80)  RR: 35 (05-02-25 @ 12:12) (19 - 35)  SpO2: 97% (05-02-25 @ 08:18) (96% - 98%)    05-01-25 @ 07:01  -  05-02-25 @ 07:00  --------------------------------------------------------  IN: 1705 mL / OUT: 0 mL / NET: 1705 mL    05-02-25 @ 07:01  -  05-02-25 @ 15:36  --------------------------------------------------------  IN: 480 mL / OUT: 200 mL / NET: 280 mL        PHYSICAL EXAM:  NAD  irreg irreg rhythm  BS clear anteriorly  abd soft   le warm, no edema      LABS:  RADIOLOGY & ADDITIONAL TESTS:  Reviewed .    MEDICATIONS  (STANDING):  apixaban 5 milliGRAM(s) Oral every 12 hours  bacitracin   Ointment 1 Application(s) Topical three times a day  carBAMazepine 200 milliGRAM(s) Oral every 12 hours  escitalopram 5 milliGRAM(s) Oral <User Schedule>  lacosamide 100 milliGRAM(s) Oral every 12 hours  losartan 50 milliGRAM(s) Oral daily  melatonin 5 milliGRAM(s) Oral at bedtime  metoprolol tartrate 37.5 milliGRAM(s) Oral every 12 hours  OLANZapine 5 milliGRAM(s) Oral every 24 hours  polyethylene glycol 3350 17 Gram(s) Oral two times a day  sodium chloride 0.9%. 1000 milliLiter(s) (100 mL/Hr) IV Continuous <Continuous>  valproic acid 1000 milliGRAM(s) Oral every 12 hours    MEDICATIONS  (PRN):  acetaminophen     Tablet .. 650 milliGRAM(s) Oral every 6 hours PRN Temp greater or equal to 38.5C (101.3F), Mild Pain (1 - 3)  benzocaine 20% Spray 1 Spray(s) Topical four times a day PRN sore throat  bisacodyl 5 milliGRAM(s) Oral every 12 hours PRN Constipation  bisacodyl Suppository 10 milliGRAM(s) Rectal daily PRN Constipation  LORazepam   Injectable 2 milliGRAM(s) IV Push once PRN Seizure Activity  ondansetron Injectable 4 milliGRAM(s) IV Push every 8 hours PRN Nausea and/or Vomiting

## 2025-05-02 NOTE — PROGRESS NOTE ADULT - PROBLEM SELECTOR PLAN 5
F: none  E: replete as needed  N: minced and moist  VTE ppx: eliquis  GI ppx: pantoprazole  Dispo: [facility]  Code status: full

## 2025-05-02 NOTE — DISCHARGE NOTE PROVIDER - NSDCDCMDCOMP_GEN_ALL_CORE
Patient is moving to the [facility] pharmacy.    Please send prescriptions over.    Thanks,  Beatriz  Rx Coordinator     This document is complete and the patient is ready for discharge.

## 2025-05-02 NOTE — PROGRESS NOTE ADULT - PROBLEM SELECTOR PLAN 4
Seen by psych inpatient for increased agitation with recommendations below  -melatonin 5 mg qHS  -zyprexa 5 mg PO qHS  -zyprexa 2.5 mg PRN for agitation  -lexapro 5 mg daily  -continue to verbally redirect and reorient patient

## 2025-05-02 NOTE — DISCHARGE NOTE PROVIDER - HOSPITAL COURSE
#Discharge: do not delete    Patient is __ yo M/F with past medical history of _____ presented with _____, found to have _____ (one liner)    Hospital course (by problem):     Patient was discharged to: (home/[facility]/acute rehab/hospice, etc, and with what services – home health PT/RN? Home O2?)    New medications:   Changes to old medications:  Medications that were stopped:    Items to follow up as outpatient:    Physical exam at the time of discharge:    Constitutional: resting comfortably in bed; NAD  Head: NC/AT  Eyes: PERRL, EOMI, anicteric sclera  ENT: no nasal discharge; MMM  Neck: supple; no JVD or thyromegaly  Respiratory: CTA B/L; no W/R/R, no retractions  Cardiac: +S1/S2; RRR; no M/R/G  Gastrointestinal: abdomen soft, NT/ND; no rebound or guarding; +BSx4  Back: spine midline, no bony tenderness or step-offs; no CVAT B/L  Extremities: WWP, no clubbing or cyanosis; no peripheral edema  Musculoskeletal: NROM x4; no joint swelling, tenderness or erythema  Vascular: 2+ radial, DP/PT pulses B/L  Dermatologic: skin warm, dry and intact; no rashes, wounds, or scars  Lymphatic: no submandibular or cervical LAD  Neurologic: AAOx3; CNII-XII grossly intact; no focal deficits  Psychiatric: affect and characteristics of appearance, verbalizations, behaviors are appropriate #Discharge: do not delete    Patient is __ yo M/F with past medical history of _____ presented with _____, found to have _____ (one liner)    Hospital course (by problem):     Patient was discharged to: (home/[facility]/acute rehab/hospice, etc, and with what services – home health PT/RN? Home O2?)    New medications:   Vimpat 100mg every 12 hours  Lexapro 5mg daily  Zyprexa 5mg daily     Changes to old medications:  Carbamazepine 600mg three times per day was reduced to 200mg every 12 hours    Medications that were stopped:    Items to follow up as outpatient:    Physical exam at the time of discharge:    Constitutional: resting comfortably in bed; NAD  Head: NC/AT  Eyes: PERRL, EOMI, anicteric sclera  ENT: no nasal discharge; MMM  Neck: supple; no JVD or thyromegaly  Respiratory: CTA B/L; no W/R/R, no retractions  Cardiac: +S1/S2; RRR; no M/R/G  Gastrointestinal: abdomen soft, NT/ND; no rebound or guarding; +BSx4  Back: spine midline, no bony tenderness or step-offs; no CVAT B/L  Extremities: WWP, no clubbing or cyanosis; no peripheral edema  Musculoskeletal: NROM x4; no joint swelling, tenderness or erythema  Vascular: 2+ radial, DP/PT pulses B/L  Dermatologic: skin warm, dry and intact; no rashes, wounds, or scars  Lymphatic: no submandibular or cervical LAD  Neurologic: AAOx3; CNII-XII grossly intact; no focal deficits  Psychiatric: affect and characteristics of appearance, verbalizations, behaviors are appropriate   #Discharge: do not delete    Patient is __ yo M/F with past medical history of _____ presented with _____, found to have _____ (one liner)    Hospital course (by problem):     Patient was discharged to: (home/[facility]/acute rehab/hospice, etc, and with what services – home health PT/RN? Home O2?)    New medications:   Vimpat 100mg every 12 hours  Lexapro 5mg daily  Zyprexa 5mg daily   Metop 50mg BID      Changes to old medications:  Carbamazepine 600mg three times per day was reduced to 200mg every 12 hours    Medications that were stopped:    Items to follow up as outpatient:    Physical exam at the time of discharge:  General: NAD, appears comfortable    HEENT:  PERRL, anicteric sclera  Cardiovascular:  RRR  Respiratory: CTA B/L  Gastrointestinal: soft, NT/ND; +BSx4  Extremities: no edema to LE  Vascular: 2+ radial pulses  Neurological: AAOx2; no focal deficits         #Discharge: do not delete      80F with PMH of brain tumor resection in 2003, alzheimer's dementia, seizures, breast cancer and hypertension, admitted to the epilepsy service with nausea, vomiting and worsening cognition with increased frequency of staring spell and overall decline. Placed on vEEG found to have multiple seizure events. Medication changes made as listed below. Transferred to medicine for further management of afib with RVR. Cardiology consulted. Started on Eliquis 5mg BID for AC and Lopressor 50mg BID. Now rate controlled. Pt is medically stable and ready for discharge with close f/u with Neurology.    Problem List/Main Diagnoses:     #Seizure  Hx of seizures since 2003 following R temporal & frontal lobes resection. Admitted for uncontrolled nausea and vomiting, and new staring spell episodes that has been identified as seizure events on vEEG monitoring. 3 seizures have been captured so far for which 1mg ativan IV has been given. Last sz was on 4/29/25 at 5:36pm.  AED levels: Carbamazepine 8.7 and VPA 72.7  on 4/28/25  vEEg discontinued on 5/3/25: Improving mild generalized slowing suggestive of a similar degree of diffuse or multifocal  dysfunction with superimposed right temporal slowing/ focal cerebral dysfunction. No seizures captured and no epileptiform discharges.  -C/w Vimpat 100 mg BID  -C/w Depakote 1000 mg PO BID  -C/w Carbamazepine 200 mg BID  -F/u with Neurology at appointment made for you     #Paroxysmal atrial fibrillation   EKG with AT and 1st degree AVB first diagnosed in 2023. ECGs now showing AFL/AF. Known history of AT for which she is on metoprolol succinate 25mg QD. Has been in an out of flutter since admission. Home Toprol was held in favor of IV lopressor 5mg q12 and later 2.5mg q6h. During RVR episode in early AM of 5/1 25mg PO Lopressor given with rate now more in the 90s-110s. [facility] 4-5. Dr. Cristofer Brownlee is cardiologist outpt.   TTE 5/1: with severe L atrial enlargement, normal ventricular function  - C/w Lopressor 50mg BID  - C/w Eliquis 5mg BID for AC    #Hypertension   Home med: Olmesartan 20mg QD  Changed to Losartan 50mg QD  Losartan discontinued 5/3 due to hypotension, will restart at a lower dose on discharge  -C/w Losartan 25mg QD    #Alzheimer dementia with agitation.   Seen by psych inpatient for increased agitation with recommendations below  -C/w Melatonin 5 mg qHS  -C/w Zyprexa 5 mg PO qHS  -C/w Lexapro 5 mg daily    New medications/therapies: Vimpat 100 mg BID, Depakote 1000 mg PO BID, Carbamazepine 200 mg BID, Lopressor 50mg BID, Eliquis 5mg BID, Losartan 25mg QD, Zyprexa 5mg QHS, Lexapro 5mg QD    Discharge plan: discharge to home    Physical Exam Upon Discharge:  Constitutional: resting comfortably in bed; NAD.  Eyes: EOMI, anicteric sclera  Respiratory: CTA B/L; no W/R/R  Cardiac: irregular irregular  Gastrointestinal: soft, NT/ND; no rebound or guarding  Extremities: WWP, no clubbing or cyanosis  Musculoskeletal: NROM x4  Neurologic: AAOx1; no focal deficits         #Discharge: do not delete      80F with PMH of brain tumor resection in 2003, alzheimer's dementia, seizures, breast cancer and hypertension, admitted to the epilepsy service with nausea, vomiting and worsening cognition with increased frequency of staring spell and overall decline. Placed on vEEG found to have multiple seizure events. Medication changes made as listed below. Transferred to medicine for further management of afib with RVR. Cardiology consulted. Started on Eliquis 5mg BID for AC and Lopressor 50mg BID. Now rate controlled. Pt is medically stable and ready for discharge with close f/u with Neurology.    Problem List/Main Diagnoses:     #Seizure  Hx of seizures since 2003 following R temporal & frontal lobes resection. Admitted for uncontrolled nausea and vomiting, and new staring spell episodes that has been identified as seizure events on vEEG monitoring. 3 seizures have been captured so far for which 1mg ativan IV has been given. Last sz was on 4/29/25 at 5:36pm.  AED levels: Carbamazepine 8.7 and VPA 72.7  on 4/28/25  vEEg discontinued on 5/3/25: Improving mild generalized slowing suggestive of a similar degree of diffuse or multifocal  dysfunction with superimposed right temporal slowing/ focal cerebral dysfunction. No seizures captured and no epileptiform discharges.  -C/w Vimpat 100 mg BID  -C/w Depakote 1000 mg PO BID  -C/w Carbamazepine 200 mg BID  -F/u with Neurology at appointment made for you     #Paroxysmal atrial fibrillation   EKG with AT and 1st degree AVB first diagnosed in 2023. ECGs now showing AFL/AF. Known history of AT for which she is on metoprolol succinate 25mg QD. Has been in an out of flutter since admission. Home Toprol was held in favor of IV lopressor 5mg q12 and later 2.5mg q6h. During RVR episode in early AM of 5/1 25mg PO Lopressor given with rate now more in the 90s-110s. [facility] 4-5. Dr. Cristofer Brownlee is cardiologist outpt.   TTE 5/1: with severe L atrial enlargement, normal ventricular function  - C/w Lopressor 50mg BID  - C/w Eliquis 5mg BID for AC    #Hypertension   Home med: Olmesartan 20mg QD  Changed to Losartan 50mg QD  Losartan discontinued 5/3 due to hypotension, will restart at a lower dose on discharge  -F/u with PCP and restart Losartan at 25mg     #Alzheimer dementia with agitation.   Seen by psych inpatient for increased agitation with recommendations below  -C/w Melatonin 5 mg qHS  -C/w Zyprexa 5 mg PO qHS  -C/w Lexapro 5 mg daily    New medications/therapies: Vimpat 100 mg BID, Depakote 1000 mg PO BID, Carbamazepine 200 mg BID, Lopressor 50mg BID, Eliquis 5mg BID, Losartan 25mg QD, Zyprexa 5mg QHS, Lexapro 5mg QD    Discharge plan: discharge to home    Physical Exam Upon Discharge:  Constitutional: resting comfortably in bed; NAD.  Eyes: EOMI, anicteric sclera  Respiratory: CTA B/L; no W/R/R  Cardiac: irregular irregular  Gastrointestinal: soft, NT/ND; no rebound or guarding  Extremities: WWP, no clubbing or cyanosis  Musculoskeletal: NROM x4  Neurologic: AAOx2; no focal deficits

## 2025-05-02 NOTE — DISCHARGE NOTE PROVIDER - NSDCCPTREATMENT_GEN_ALL_CORE_FT
PRINCIPAL PROCEDURE  Procedure: EEG awake and asleep  Findings and Treatment: EEG Daily Summary (4/30/25):    1)	Moderate generalized slowing suggestive of a similar degree of diffuse or multifocal  dysfunction.  2)	Frequent (1+/min < 1/10s)right> left temporal sharp wave discharges, somewhat improved  3)	 3 electrographic subclinical right temporal seizures were captured in the early afternoon, one of which had witnessed staring, last seizure occurring at 5:36 PM  EEG Daily Summary (5/2/25:    -Improving mild to moderate generalized slowing suggestive of a similar degree of diffuse or multifocal  dysfunction with superimposed right temporal slowing/ focal cerebral dysfunction.  -Rare left temporal sharp wave discharges, improving.  -No seizures captured for ~ 72 hrs.

## 2025-05-03 ENCOUNTER — TRANSCRIPTION ENCOUNTER (OUTPATIENT)
Age: 80
End: 2025-05-03

## 2025-05-03 PROCEDURE — 95718 EEG PHYS/QHP 2-12 HR W/VEEG: CPT

## 2025-05-03 PROCEDURE — 99232 SBSQ HOSP IP/OBS MODERATE 35: CPT

## 2025-05-03 PROCEDURE — 95720 EEG PHY/QHP EA INCR W/VEEG: CPT

## 2025-05-03 PROCEDURE — 99233 SBSQ HOSP IP/OBS HIGH 50: CPT | Mod: GC

## 2025-05-03 RX ORDER — METOPROLOL SUCCINATE 50 MG/1
1 TABLET, EXTENDED RELEASE ORAL
Refills: 0 | DISCHARGE

## 2025-05-03 RX ORDER — METOPROLOL SUCCINATE 50 MG/1
50 TABLET, EXTENDED RELEASE ORAL EVERY 12 HOURS
Refills: 0 | Status: DISCONTINUED | OUTPATIENT
Start: 2025-05-03 | End: 2025-05-04

## 2025-05-03 RX ORDER — SODIUM CHLORIDE 9 G/1000ML
500 INJECTION, SOLUTION INTRAVENOUS ONCE
Refills: 0 | Status: COMPLETED | OUTPATIENT
Start: 2025-05-03 | End: 2025-05-03

## 2025-05-03 RX ORDER — METOPROLOL SUCCINATE 50 MG/1
1 TABLET, EXTENDED RELEASE ORAL
Qty: 60 | Refills: 0
Start: 2025-05-03 | End: 2025-06-01

## 2025-05-03 RX ORDER — LOSARTAN POTASSIUM 100 MG/1
1 TABLET, FILM COATED ORAL
Qty: 30 | Refills: 0
Start: 2025-05-03 | End: 2025-06-01

## 2025-05-03 RX ADMIN — SODIUM CHLORIDE 500 MILLILITER(S): 9 INJECTION, SOLUTION INTRAVENOUS at 13:37

## 2025-05-03 RX ADMIN — Medication 650 MILLIGRAM(S): at 10:47

## 2025-05-03 RX ADMIN — METOPROLOL SUCCINATE 50 MILLIGRAM(S): 50 TABLET, EXTENDED RELEASE ORAL at 21:52

## 2025-05-03 RX ADMIN — POLYETHYLENE GLYCOL 3350 17 GRAM(S): 17 POWDER, FOR SOLUTION ORAL at 17:47

## 2025-05-03 RX ADMIN — Medication 650 MILLIGRAM(S): at 01:04

## 2025-05-03 RX ADMIN — Medication 650 MILLIGRAM(S): at 09:47

## 2025-05-03 RX ADMIN — Medication 650 MILLIGRAM(S): at 00:04

## 2025-05-03 RX ADMIN — CARBAMAZEPINE 200 MILLIGRAM(S): 200 TABLET ORAL at 21:49

## 2025-05-03 RX ADMIN — Medication 1000 MILLIGRAM(S): at 18:20

## 2025-05-03 RX ADMIN — METOPROLOL SUCCINATE 50 MILLIGRAM(S): 50 TABLET, EXTENDED RELEASE ORAL at 09:30

## 2025-05-03 RX ADMIN — Medication 1 APPLICATION(S): at 13:37

## 2025-05-03 RX ADMIN — CARBAMAZEPINE 200 MILLIGRAM(S): 200 TABLET ORAL at 09:40

## 2025-05-03 RX ADMIN — Medication 1 APPLICATION(S): at 07:02

## 2025-05-03 RX ADMIN — ESCITALOPRAM OXALATE 5 MILLIGRAM(S): 20 TABLET ORAL at 09:39

## 2025-05-03 RX ADMIN — APIXABAN 5 MILLIGRAM(S): 2.5 TABLET, FILM COATED ORAL at 17:47

## 2025-05-03 RX ADMIN — APIXABAN 5 MILLIGRAM(S): 2.5 TABLET, FILM COATED ORAL at 07:01

## 2025-05-03 RX ADMIN — Medication 1 APPLICATION(S): at 00:03

## 2025-05-03 RX ADMIN — Medication 1 APPLICATION(S): at 21:49

## 2025-05-03 RX ADMIN — OLANZAPINE 5 MILLIGRAM(S): 10 TABLET ORAL at 17:47

## 2025-05-03 RX ADMIN — Medication 40 MILLIGRAM(S): at 07:01

## 2025-05-03 RX ADMIN — Medication 1000 MILLIGRAM(S): at 00:03

## 2025-05-03 RX ADMIN — LACOSAMIDE 100 MILLIGRAM(S): 150 TABLET, FILM COATED ORAL at 09:30

## 2025-05-03 RX ADMIN — LACOSAMIDE 100 MILLIGRAM(S): 150 TABLET, FILM COATED ORAL at 21:49

## 2025-05-03 RX ADMIN — LOSARTAN POTASSIUM 50 MILLIGRAM(S): 100 TABLET, FILM COATED ORAL at 07:01

## 2025-05-03 RX ADMIN — Medication 5 MILLIGRAM(S): at 21:49

## 2025-05-03 RX ADMIN — POLYETHYLENE GLYCOL 3350 17 GRAM(S): 17 POWDER, FOR SOLUTION ORAL at 07:03

## 2025-05-03 NOTE — DISCHARGE NOTE NURSING/CASE MANAGEMENT/SOCIAL WORK - NSPROMEDSBROUGHTTOHOSP_GEN_A_NUR
I independently performed the documented:
I independently performed the documented:
no
I attest my time as attending is greater than 50% of the total combined time spent on qualifying patient care activities by the PA/NP and attending.
I attest my time as attending is greater than 50% of the total combined time spent on qualifying patient care activities by the PA/NP and attending.

## 2025-05-03 NOTE — PROGRESS NOTE ADULT - SUBJECTIVE AND OBJECTIVE BOX
Physical Medicine and Rehabilitation Progress Note :       Patient is a 80y old  Female who presents with a chief complaint of nausea and vomiting (03 May 2025 07:42)      HPI:  81yo Female w/ PMHx of R frontal and temporal lobe brain tumor (s/p resection in 2003) c/b seizures (currently on Depakote ER 1g BID and Carbamazepine 600 mg TID), L breast cancer (s/p lumpectomy and RT in 2019), HTN, and alzheimer, L breast cancer (s/p lumpectomy and RT in 2019), HTN presented for evaluation of nausea, vomiting and possible subclinical seizures. Collateral obtained from daughter and family friend at bedside. They reported that patient had a generalized toniconic seizure 3-minute duration on 4/24 aborted with midazolam intranasal. However, since then she has had episodes of staring near daily. It is unclear how many times a day it occurs. However, they seem to be occurring more frequently like once a week versus a month ago where it would occur once a month.      For last 2 days,  patient with nausea and vomiting, denied any sick contacts. Patient is similar to current baseline of waxing and waning mental status but with more staring episodes. Follows Dr Najjar outpatient and was advised to present to ED if patient does not return to baseline after seizure and to further work up etiology of N/V and decreased PO intake. Was supposed to follow up with Dr Najjar outpatient on 4/29. Daughter reported patient has had dramatically worsened cognition over the past one year because this time last year the patient was able to do chores and walk ambulate independently but over this past year has lost ability to do chores and now walks with walker. Normally can feed herself but on 4/28 needed to be fed by family    VITAL SIGNS: Last 24 Hours  T(C): 36.5 (28 Apr 2025 23:07), Max: 37.1 (28 Apr 2025 22:25)  T(F): 97.7 (28 Apr 2025 23:07), Max: 98.7 (28 Apr 2025 22:25)  HR: 95 (28 Apr 2025 23:07) (75 - 95)  BP: 157/76 (28 Apr 2025 23:07) (100/57 - 157/76)  BP(mean): --  RR: 18 (28 Apr 2025 23:07) (18 - 18)  SpO2: 95% (28 Apr 2025 23:07) (95% - 98%)    Admitted to EMU (29 Apr 2025 03:20)                Vital Signs Last 24 Hrs  T(C): 36.7 (03 May 2025 09:00), Max: 36.9 (02 May 2025 14:38)  T(F): 98 (03 May 2025 09:00), Max: 98.4 (02 May 2025 14:38)  HR: 99 (03 May 2025 10:34) (81 - 114)  BP: 110/76 (03 May 2025 10:34) (100/56 - 133/82)  BP(mean): 87 (03 May 2025 10:34) (75 - 102)  RR: 15 (03 May 2025 10:34) (15 - 35)  SpO2: 98% (03 May 2025 10:34) (96% - 98%)    Parameters below as of 03 May 2025 10:34  Patient On (Oxygen Delivery Method): room air        MEDICATIONS  (STANDING):  apixaban 5 milliGRAM(s) Oral every 12 hours  bacitracin   Ointment 1 Application(s) Topical three times a day  carBAMazepine 200 milliGRAM(s) Oral every 12 hours  escitalopram 5 milliGRAM(s) Oral <User Schedule>  lacosamide 100 milliGRAM(s) Oral every 12 hours  losartan 50 milliGRAM(s) Oral daily  melatonin 5 milliGRAM(s) Oral at bedtime  metoprolol tartrate 50 milliGRAM(s) Oral every 12 hours  OLANZapine 5 milliGRAM(s) Oral every 24 hours  pantoprazole    Tablet 40 milliGRAM(s) Oral before breakfast  polyethylene glycol 3350 17 Gram(s) Oral two times a day  sodium chloride 0.9%. 1000 milliLiter(s) (100 mL/Hr) IV Continuous <Continuous>  valproic acid 1000 milliGRAM(s) Oral every 12 hours    MEDICATIONS  (PRN):  acetaminophen     Tablet .. 650 milliGRAM(s) Oral every 6 hours PRN Temp greater or equal to 38.5C (101.3F), Mild Pain (1 - 3)  benzocaine 20% Spray 1 Spray(s) Topical four times a day PRN Sore Throat  bisacodyl 5 milliGRAM(s) Oral every 12 hours PRN Constipation  bisacodyl Suppository 10 milliGRAM(s) Rectal daily PRN Constipation  LORazepam   Injectable 2 milliGRAM(s) IV Push once PRN Seizure Activity  ondansetron Injectable 4 milliGRAM(s) IV Push every 8 hours PRN Nausea and/or Vomiting      T(C): 36.7 (05-03-25 @ 09:00), Max: 36.9 (05-02-25 @ 14:38)  HR: 99 (05-03-25 @ 10:34) (81 - 114)  BP: 110/76 (05-03-25 @ 10:34) (100/56 - 133/82)  RR: 15 (05-03-25 @ 10:34) (15 - 35)  SpO2: 98% (05-03-25 @ 10:34) (96% - 98%)    Physical Exam:80 y o woman lying comfortably in semi Powell's position , awake , alert ,  NAD     Head: normocephalic , atraumatic    Eyes: PERRLA , EOMI , no nystagmus , sclera anicteric    ENT / FACE: neg nasal discharge , uvula midline , no oropharyngeal erythema / exudate    Neck: supple , negative JVD , negative carotid bruits , no thyromegaly    Chest: CTA bilaterally     Cardiovascular: regular rate and rhythm , neg murmurs / rubs / gallops    Abdomen: soft , non distended , no tenderness to palpation in all 4 quadrants ,  normal bowel sounds     Extremities: WWP , neg cyanosis /clubbing / edema     Neurologic Exam:     Alert and oriented to person , speech fluent w/ mild dysarthria , follows commands     Cranial Nerves:           II:                         pupils equal , round and reactive to light , visual fields intact         III/ IV/VI:             extraocular movements intact , neg nystagmus , neg ptosis        V:                        facial sensation intact , V1-3 normal        VII:                      face symmetric , no droop , normal eye closure and smile        VIII:                     hearing intact to finger rub bilaterally        IX and X:             no hoarseness , gag intact , palate/ uvula rise symmetrically        XI:                       SCM / trapezius strength intact bilateral        XII:                      no tongue deviation    Motor Exam:        > 3+/5 x 4 extremities , without drift     Sensation:         intact to light touch x 4 extremities                            no neglect or extinction on double simultaneous testing    DTR:           biceps/brachioradialis: equal                            patella/ankle: equal          neg Babinski      Coordination:            Finger to Nose:  neg dysmetria bilaterally         5/2/2025  Functional Status Assessment :       Pain Assessment/Number Scale (0-10) Adult  Presence of Pain: complains of pain/discomfort  Body Location: neck   Radiation To Radiation To: unrated     Safety      [facility] Functional Assessment: Basic Mobility  Turning from your back to your side while in a flat bed without using bedrails?: 2 = A lot of assistance  Moving from lying on your back to sitting on the flat side of a flat bed without using bedrails?: 2 = A lot of assistance  Moving to and from a bed to a chair (including a wheelchair)?: 2 = A lot of assistance  Standing up from a chair using your arms (e.g. wheelchair or bedside chair)?: 1 = Total assistance  Walking in hospital room?: 1 = Total assistance  Climbing 3-5 steps with a railing?: 1 = Total assistance     Score: 9     Cognitive/Neuro      Cognitive/Neuro/Behavioral  Level of Consciousness: confused  Arousal Level: arouses to voice  Orientation: disoriented to;  time;  situation  Speech: clear  Mood/Behavior: calm    Language Assistance  Preferred Language to Address Healthcare Preferred Language to Address Healthcare: English    Therapeutic Interventions      Bed Mobility  Bed Mobility Training Sit-to-Supine: maximum assist (25% patient effort);  2 person assist;  verbal cues  Bed Mobility Training Supine-to-Sit: maximum assist (25% patient effort);  2 person assist;  verbal cues  Bed Mobility Training Limitations: decreased ability to use legs for bridging/pushing;  impaired ability to control trunk for mobility;  decreased strength;  impaired balance;  impaired postural control    Sit-Stand Transfer Training  Sit-to-Stand Transfer Training Charges: PT deferred 2/2 pt retropulsive sitting EOB     Therapeutic Exercise  Therapeutic Exercise Detail: seated reaching, active cervical spine rotation, supine LLE heel slides         PM&R Impression : as above    Current disposition plan recommendation :    subacute rehab placement

## 2025-05-03 NOTE — PROGRESS NOTE ADULT - PROBLEM SELECTOR PLAN 4
Seen by psych inpatient for increased agitation with recommendations below    -C/w Melatonin 5 mg qHS  -C/w Zyprexa 5 mg PO qHS  -Zyprexa 2.5 mg PRN for agitation  -C/w Lexapro 5 mg daily  -Continue to verbally redirect and reorient patient

## 2025-05-03 NOTE — PROGRESS NOTE ADULT - ASSESSMENT
I M    80F with PMH of brain tumor resection in 2003, alzheimer's dementia, seizures, breast cancer and hypertension, admitted to the epilepsy service with nausea, vomiting and worsening cognition with increased frequency of staring spell and overall decline, transferred to medicine for further management of afib with RVR.       Problem/Plan - 1:  ·  Problem: Seizure.   ·  Plan: Hx of seizures since 2003 following R temporal & frontal lobes resection. Admitted for uncontrolled nausea and vomiting, and new staring spell episodes that has been identified as seizure events on vEEG monitoring. 3 seizures have been captured so far for which 1mg ativan IV has been given. Last sz was on 4/29/25 at 5:36pm.  AED levels: Carbamazepine 8.7 and VPA 72.7  on 4/28/25  -vimpat 100 mg BID  -depakote 1000 mg IV BID  -carbamazepine 450 mg BID  -Ativan 2mg IVP PRN for seizures > 2mins  -Neurological & Vitals assessment Q8hrs  -Maintain Seizure/Fall/Aspiration precautions  -f/u vEEG  -f/u epilepsy recs.    Problem/Plan - 2:  ·  Problem: Paroxysmal atrial fibrillation.   ·  Plan: EKG with AT and 1st degree AVB first diagnosed in 2023. ECGs now showing AFL/AF. Known history of AT for which she is on metoprolol succinate 25mg QD. Has been in an out of flutter since admission. Home Toprol was held in favor of IV lopressor 5mg q12 and later 2.5mg q6h. During RVR episode in early AM of 5/1 25mg PO Lopressor given with rate now more in the 90s-110s. [facility] 4-5. Dr. Cristofer Brownlee is cardiologist outpt.   TTE 5/1: with severe L atrial enlargement, normal ventricular function  - Lopressor 37.5mg BID PO, with uptitration to 50mg BID PO if needed   - Eliquis 5mg BID for AC  - Not a candidate for DCCV right now given paroxysmal nature and lack of AC prior, if rates becomes more difficult to control despite escalating PO regimen will consider WILBER/DCCV.    Problem/Plan - 3:  ·  Problem: Hypertension.   ·  Plan: Takes olmesartan 20mg QD  - c/w home meds, hold for sBP <100.    Problem/Plan - 4:  ·  Problem: Alzheimer dementia with agitation.   ·  Plan: Seen by psych inpatient for increased agitation with recommendations below  -melatonin 5 mg qHS  -zyprexa 5 mg PO qHS  -zyprexa 2.5 mg PRN for agitation  -lexapro 5 mg daily  -continue to verbally redirect and reorient patient.    Problem/Plan - 5:  ·  Problem: Prophylactic measure.   ·  Plan: F: none  E: replete as needed  N: minced and moist  VTE ppx: eliquis  GI ppx: pantoprazole  Dispo: [facility]  Code status: full.    Attestation Statements:   Attestation Statements:  I have personally seen and examined the patient.  I fully participated in the care of this patient.  I have made amendments to the documentation where necessary, and agree with the history, physical exam, and plan as documented by the Resident.     80F with PMH of brain tumor resection in 2003, alzheimer's dementia, seizures, breast cancer and hypertension, admitted to the epilepsy service with nausea, vomiting and worsening cognition with increased frequency of staring spell and overall decline, transferred to medicine for further management of afib with RVR.     Labs and imaging reviewed    Problem List  #Gastroenteritis  #Epilepsy with recurrent seizures  #Chronic Afib with RVR  #Alzheimer's Dementia  #HTN    Plan  -Patient with improvement in HR will D/C tele today  -Now tolerating PO, increasing PO Metoprolol today  -Epilepsy on board and aiding in management of seizres  -Medically ready for discharge, plan for transition tomorrow after reinstating home health aides

## 2025-05-03 NOTE — PROGRESS NOTE ADULT - PROBLEM SELECTOR PLAN 3
Takes olmesartan 20mg QD    -Changed to Losartan 50mg QD  -Losartan discontinued 5/3 due to hypotension, will restart at a lower dose on discharge

## 2025-05-03 NOTE — PROGRESS NOTE ADULT - PROBLEM SELECTOR PLAN 2
EKG with AT and 1st degree AVB first diagnosed in 2023. ECGs now showing AFL/AF. Known history of AT for which she is on metoprolol succinate 25mg QD. Has been in an out of flutter since admission. Home Toprol was held in favor of IV lopressor 5mg q12 and later 2.5mg q6h. During RVR episode in early AM of 5/1 25mg PO Lopressor given with rate now more in the 90s-110s. [facility]ASC 4-5. Dr. Cristofer Brownlee is cardiologist outpt.   TTE 5/1: with severe L atrial enlargement, normal ventricular function      - C/w Lopressor 50mg BID  - Eliquis 5mg BID for AC  - Not a candidate for DCCV right now given paroxysmal nature and lack of AC prior, if rates becomes more difficult to control despite escalating PO regimen will consider WILBER/DCCV

## 2025-05-03 NOTE — PROGRESS NOTE ADULT - PROBLEM SELECTOR PROBLEM 2
Nausea & vomiting
Paroxysmal atrial fibrillation
Nausea & vomiting

## 2025-05-03 NOTE — PROGRESS NOTE ADULT - PROBLEM SELECTOR PLAN 2
EKG with AT and 1st degree AVB first diagnosed in 2023. ECGs now showing AFL/AF. Known history of AT for which she is on metoprolol succinate 25mg QD. Has been in an out of flutter since admission. Home Toprol was held in favor of IV lopressor 5mg q12 and later 2.5mg q6h. During RVR episode in early AM of 5/1 25mg PO Lopressor given with rate now more in the 90s-110s. CHADSVASC 4-5. Dr. Cristofer Brownlee is cardiologist outpt.   TTE 5/1: with severe L atrial enlargement, normal ventricular function  - metop tartrate 50mg BID PO- will d/w cardiology given hypotension post administration   - Eliquis 5mg BID for AC - discussed interaction with AED with pharmacy, okay to cont per epilepsy team   - Not a candidate for DCCV right now given paroxysmal nature and lack of AC prior, if rates becomes more difficult to control despite escalating PO regimen will consider WILBER/DCCV

## 2025-05-03 NOTE — DISCHARGE NOTE NURSING/CASE MANAGEMENT/SOCIAL WORK - PATIENT PORTAL LINK FT
You can access the FollowMyHealth Patient Portal offered by [facility] by registering at the following website: [email or web address]. By joining [facility]’s FollowMyHealth portal, you will also be able to view your health information using other applications (apps) compatible with our system.

## 2025-05-03 NOTE — DISCHARGE NOTE NURSING/CASE MANAGEMENT/SOCIAL WORK - FINANCIAL ASSISTANCE
[facility] provides services at a reduced cost to those who are determined to be eligible through [facility]’s financial assistance program. Information regarding [facility]’s financial assistance program can be found by going to [email or web address] or by calling [phone].

## 2025-05-03 NOTE — PROGRESS NOTE ADULT - PROBLEM SELECTOR PROBLEM 5
Prophylactic measure
Rapid atrial fibrillation
Prophylactic measure
Prophylactic measure

## 2025-05-03 NOTE — PROGRESS NOTE ADULT - PROBLEM SELECTOR PLAN 1
Hx of seizures since 2003 following R temporal & frontal lobes resection. Admitted for uncontrolled nausea and vomiting, and new staring spell episodes that has been identified as seizure events on vEEG monitoring. 3 seizures have been captured so far for which 1mg ativan IV has been given. Last sz was on 4/29/25 at 5:36pm.  AED levels: Carbamazepine 8.7 and VPA 72.7  on 4/28/25  vEEg discontinued on 5/3/25: Improving mild generalized slowing suggestive of a similar degree of diffuse or multifocal  dysfunction with superimposed right temporal slowing/ focal cerebral dysfunction.  No seizures captured and no epileptiform discharges.    -C/w Vimpat 100 mg BID  -C/w Depakote 1000 mg PO BID  -C/w Carbamazepine 200 mg BID  -Ativan 2mg IVP PRN for seizures > 2mins  -Neurological & Vitals assessment Q8hrs  -Maintain Seizure/Fall/Aspiration precautions

## 2025-05-03 NOTE — PROGRESS NOTE ADULT - SUBJECTIVE AND OBJECTIVE BOX
Inteval history:    No events overnight. No complaints currently.    ROS  As above, otherwise negative for constitutional/HEENT/CV/pulm/GI//MSK/neuro/derm/endocrine/psych.     MEDICATIONS  (STANDING):  apixaban 5 milliGRAM(s) Oral every 12 hours  bacitracin   Ointment 1 Application(s) Topical three times a day  carBAMazepine 200 milliGRAM(s) Oral every 12 hours  escitalopram 5 milliGRAM(s) Oral <User Schedule>  lacosamide 100 milliGRAM(s) Oral every 12 hours  losartan 50 milliGRAM(s) Oral daily  melatonin 5 milliGRAM(s) Oral at bedtime  metoprolol tartrate 50 milliGRAM(s) Oral every 12 hours  OLANZapine 5 milliGRAM(s) Oral every 24 hours  pantoprazole    Tablet 40 milliGRAM(s) Oral before breakfast  polyethylene glycol 3350 17 Gram(s) Oral two times a day  sodium chloride 0.9%. 1000 milliLiter(s) (100 mL/Hr) IV Continuous <Continuous>  valproic acid 1000 milliGRAM(s) Oral every 12 hours    MEDICATIONS  (PRN):  acetaminophen     Tablet .. 650 milliGRAM(s) Oral every 6 hours PRN Temp greater or equal to 38.5C (101.3F), Mild Pain (1 - 3)  benzocaine 20% Spray 1 Spray(s) Topical four times a day PRN Sore Throat  bisacodyl 5 milliGRAM(s) Oral every 12 hours PRN Constipation  bisacodyl Suppository 10 milliGRAM(s) Rectal daily PRN Constipation  LORazepam   Injectable 2 milliGRAM(s) IV Push once PRN Seizure Activity  ondansetron Injectable 4 milliGRAM(s) IV Push every 8 hours PRN Nausea and/or Vomiting      T(C): 36.4 (05-03-25 @ 05:06), Max: 37.1 (05-02-25 @ 09:12)  HR: 81 (05-03-25 @ 06:45) (81 - 114)  BP: 122/76 (05-03-25 @ 06:45) (100/56 - 133/82)  RR: 20 (05-03-25 @ 06:45) (18 - 35)  SpO2: 98% (05-03-25 @ 06:45) (96% - 98%)      General:  Constitutional:  Sitting comfortably in NAD.  Ears, Nose, Throat: no abnormalities, mucus membranes moist  Neck: supple, no lymphadenopathy  Extremities: no edema, clubbing or cyanosis  Skin: no rash or neurocutaneous signs     Cognitive:  Orientation, language, memory and knowledge screens intact.    Cranial Nerves:  II: JENISE. III/IV/VI: EOM Full.  Absent nystagmus  V1V2V3: Symmetric, VII: Face appears symmetric VIII: Normal to screening, IX/X: Palate Elevates Symmetrical  XI: Trapezius Symmetric  XII: Tongue midline  Motor:  Power: no pronator drift, power 5/5 distal U/E  Tone: normal x 4 limbs  No tremor  Coordination/Gait:  Finger-nose intact, normal finger taps and rapid-alternating movements,   Narrow based gait, tandem forward   hops well on both feet  Reflexes:  DTR: 2+ symmetric all 4 limbs, no clonus      Investigations:                  EEG: Inteval history:    No events overnight. No complaints currently. Daughter at bedside notes she is more alert, tolerating diet and taking oral meds.     ROS  As above, otherwise negative for constitutional/HEENT/CV/pulm/GI//MSK/neuro/derm/endocrine/psych.     MEDICATIONS  (STANDING):  apixaban 5 milliGRAM(s) Oral every 12 hours  bacitracin   Ointment 1 Application(s) Topical three times a day  carBAMazepine 200 milliGRAM(s) Oral every 12 hours  escitalopram 5 milliGRAM(s) Oral <User Schedule>  lacosamide 100 milliGRAM(s) Oral every 12 hours  losartan 50 milliGRAM(s) Oral daily  melatonin 5 milliGRAM(s) Oral at bedtime  metoprolol tartrate 50 milliGRAM(s) Oral every 12 hours  OLANZapine 5 milliGRAM(s) Oral every 24 hours  pantoprazole    Tablet 40 milliGRAM(s) Oral before breakfast  polyethylene glycol 3350 17 Gram(s) Oral two times a day  sodium chloride 0.9%. 1000 milliLiter(s) (100 mL/Hr) IV Continuous <Continuous>  valproic acid 1000 milliGRAM(s) Oral every 12 hours    MEDICATIONS  (PRN):  acetaminophen     Tablet .. 650 milliGRAM(s) Oral every 6 hours PRN Temp greater or equal to 38.5C (101.3F), Mild Pain (1 - 3)  benzocaine 20% Spray 1 Spray(s) Topical four times a day PRN Sore Throat  bisacodyl 5 milliGRAM(s) Oral every 12 hours PRN Constipation  bisacodyl Suppository 10 milliGRAM(s) Rectal daily PRN Constipation  LORazepam   Injectable 2 milliGRAM(s) IV Push once PRN Seizure Activity  ondansetron Injectable 4 milliGRAM(s) IV Push every 8 hours PRN Nausea and/or Vomiting      T(C): 36.4 (05-03-25 @ 05:06), Max: 37.1 (05-02-25 @ 09:12)  HR: 81 (05-03-25 @ 06:45) (81 - 114)  BP: 122/76 (05-03-25 @ 06:45) (100/56 - 133/82)  RR: 20 (05-03-25 @ 06:45) (18 - 35)  SpO2: 98% (05-03-25 @ 06:45) (96% - 98%)      General:  Constitutional:  Sitting comfortably in NAD.  Ears, Nose, Throat: no abnormalities, mucus membranes moist  Neck: supple, no lymphadenopathy  Extremities: no edema, clubbing or cyanosis  Skin: no rash or neurocutaneous signs     Cognitive:  patient awake, alert, states her full name and "[facility]" in [facility], does not know date, follows simple commands (stick out tongue).    Cranial Nerves:  II: JENISE. III/IV/VI: EOM Full.  Absent nystagmus  V1V2V3: Symmetric, VII: Face appears symmetric VIII: Normal to screening, IX/X: Palate Elevates Symmetrical  XI: Trapezius Symmetric  XII: Tongue midline  Motor:  Power: no pronator drift, power 5/5 distal U/E and raises b/l LE antigravity  Tone: normal x 4 limbs  Mild tremor with posture b/l UE  Coordination/Gait:  Finger-nose intact      EEG: * no seizures or discharges, pls see separate report for full details.

## 2025-05-03 NOTE — PROGRESS NOTE ADULT - ASSESSMENT
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     Patient had 3 electrographic seizures on 4/29, one which correlated with her staring spells witnessed by daughters at home. Last seizure was 5:36 pm on 4/29/25 and successfully aborted with addition of Vimpat. Now concerns for rapid atrial fibrillation that started overnight around 12am 5/1/2025 with multiple med adjustments for rate control, and improving. Vimpat has a lower risk for atrial fib/tachycardia but as a precaution, dose was decreased back to 100mg BID temporarily. Most likely etiology for new onset afib is patient's inconsistent beta blocker use (due to initial refusal of oral meds) and age-related factors along with prior history of atrial tachycardia.     Recommendations:    - D/C VEEG  - C/w Vimpat PO 100mg Q12hrs  - C/w Depakote PO 1000mg Q12hrs  - C/w Carbamazepine 200mg BID   - Also , in addition to meds above, will be discharged on the following mood Rx regimen which has been working very well : continue lexapro 5mg daily and zyprexa 5mg PO QHS (8pm) upon discharge.   - Follow-up appointments will be provided to patient for televisit with Dr Najjar's NP Gretchen Carrera on 5/5/25 at 2pm and in-person visit with Najjar on 5/14/25 at 11am   - Maintain Seizure/Fall/Aspiration precautions.

## 2025-05-03 NOTE — PROGRESS NOTE ADULT - TIME BILLING
chart review, patient interview/exam, review of labs/imaging, management of medical conditions, counseling/educating patient and family, documentation; excludes teaching and separately reported services
As above
direct patient care  (interview and examination of patient), discussion with other providers, support staff and/or patient's family members, review of medical records, ordering diagnostic tests and analyzing results, and documentation.
Bedside exam and interview   Reviewed vitals, labs   Discussed patient's plan of care with house staff   Documentation of encounter    Time documented on encounter excludes teaching and separately reported services

## 2025-05-03 NOTE — PROGRESS NOTE ADULT - SUBJECTIVE AND OBJECTIVE BOX
OVERNIGHT EVENTS: NAEO    SUBJECTIVE / INTERVAL HPI: Patient seen and examined at bedside this morning. Offers no c/o any sort. Pt noted to be hypotensive with SBPs of 80s, asymptomatic.  Bolus x 1. BP recovered slowly over the course of the day. AM losartan discontinued. Patient denying chest pain, SOB, palpitations, cough.     Remaining ROS negative       PHYSICAL EXAM:  Constitutional: resting comfortably in bed; NAD. vEEG on  Eyes: EOMI, anicteric sclera  Respiratory: CTA B/L; no W/R/R  Cardiac: irregular irregular  Gastrointestinal: soft, NT/ND; no rebound or guarding  Extremities: WWP, no clubbing or cyanosis  Musculoskeletal: NROM x4  Neurologic: AAOx1; no focal deficits        VITAL SIGNS:  Vital Signs Last 24 Hrs  T(C): 36.8 (03 May 2025 13:35), Max: 36.8 (03 May 2025 13:35)  T(F): 98.2 (03 May 2025 13:35), Max: 98.2 (03 May 2025 13:35)  HR: 97 (03 May 2025 17:52) (74 - 112)  BP: 109/76 (03 May 2025 17:52) (73/45 - 128/81)  BP(mean): 89 (03 May 2025 17:52) (53 - 101)  RR: 16 (03 May 2025 17:52) (14 - 20)  SpO2: 97% (03 May 2025 17:52) (91% - 98%)    Parameters below as of 03 May 2025 17:52  Patient On (Oxygen Delivery Method): room air    MEDICATIONS:  MEDICATIONS  (STANDING):  apixaban 5 milliGRAM(s) Oral every 12 hours  bacitracin   Ointment 1 Application(s) Topical three times a day  carBAMazepine 200 milliGRAM(s) Oral every 12 hours  escitalopram 5 milliGRAM(s) Oral <User Schedule>  lacosamide 100 milliGRAM(s) Oral every 12 hours  melatonin 5 milliGRAM(s) Oral at bedtime  metoprolol tartrate 50 milliGRAM(s) Oral every 12 hours  OLANZapine 5 milliGRAM(s) Oral every 24 hours  pantoprazole    Tablet 40 milliGRAM(s) Oral before breakfast  polyethylene glycol 3350 17 Gram(s) Oral two times a day  sodium chloride 0.9%. 1000 milliLiter(s) (100 mL/Hr) IV Continuous <Continuous>  valproic acid 1000 milliGRAM(s) Oral every 12 hours    MEDICATIONS  (PRN):  acetaminophen     Tablet .. 650 milliGRAM(s) Oral every 6 hours PRN Temp greater or equal to 38.5C (101.3F), Mild Pain (1 - 3)  benzocaine 20% Spray 1 Spray(s) Topical four times a day PRN Sore Throat  bisacodyl 5 milliGRAM(s) Oral every 12 hours PRN Constipation  bisacodyl Suppository 10 milliGRAM(s) Rectal daily PRN Constipation  LORazepam   Injectable 2 milliGRAM(s) IV Push once PRN Seizure Activity  ondansetron Injectable 4 milliGRAM(s) IV Push every 8 hours PRN Nausea and/or Vomiting      ALLERGIES:  Allergies    Zonegran (Other)  Dilantin (Hives)  Bactrim (Unknown)  phenobarbital (Unknown)  MSG (Unknown)    Intolerances    antihistamines (Unknown)  Keppra (Unknown)  Fish Products (Unknown)  Neurontin (Unknown)  Topamax (Sedation/Somnol)  Trileptal (Nausea)  shellfish (Unknown)      LABS:              CAPILLARY BLOOD GLUCOSE          RADIOLOGY & ADDITIONAL TESTS: Reviewed.

## 2025-05-03 NOTE — PROGRESS NOTE ADULT - SUBJECTIVE AND OBJECTIVE BOX
SUBJECTIVE: NAOEN. This morning patient seen with daughter at bedside multiple times, patient comfortable. Tele reviewed, rapid -140s prior to morning dose of metoprolol. After metoprolol, noted to have improved rates but with asymptomatic hypotension.       DIET:      MEDICATIONS:  MEDICATIONS  (STANDING):  apixaban 5 milliGRAM(s) Oral every 12 hours  bacitracin   Ointment 1 Application(s) Topical three times a day  carBAMazepine 200 milliGRAM(s) Oral every 12 hours  escitalopram 5 milliGRAM(s) Oral <User Schedule>  lacosamide 100 milliGRAM(s) Oral every 12 hours  melatonin 5 milliGRAM(s) Oral at bedtime  metoprolol tartrate 50 milliGRAM(s) Oral every 12 hours  OLANZapine 5 milliGRAM(s) Oral every 24 hours  pantoprazole    Tablet 40 milliGRAM(s) Oral before breakfast  polyethylene glycol 3350 17 Gram(s) Oral two times a day  sodium chloride 0.9%. 1000 milliLiter(s) (100 mL/Hr) IV Continuous <Continuous>  valproic acid 1000 milliGRAM(s) Oral every 12 hours    MEDICATIONS  (PRN):  acetaminophen     Tablet .. 650 milliGRAM(s) Oral every 6 hours PRN Temp greater or equal to 38.5C (101.3F), Mild Pain (1 - 3)  benzocaine 20% Spray 1 Spray(s) Topical four times a day PRN Sore Throat  bisacodyl 5 milliGRAM(s) Oral every 12 hours PRN Constipation  bisacodyl Suppository 10 milliGRAM(s) Rectal daily PRN Constipation  LORazepam   Injectable 2 milliGRAM(s) IV Push once PRN Seizure Activity  ondansetron Injectable 4 milliGRAM(s) IV Push every 8 hours PRN Nausea and/or Vomiting      Allergies    Zonegran (Other)  Dilantin (Hives)  Bactrim (Unknown)  phenobarbital (Unknown)  MSG (Unknown)    Intolerances    antihistamines (Unknown)  Keppra (Unknown)  Fish Products (Unknown)  Neurontin (Unknown)  Topamax (Sedation/Somnol)  Trileptal (Nausea)  shellfish (Unknown)      OBJECTIVE:  Vital Signs Last 24 Hrs  T(C): 36.8 (03 May 2025 13:35), Max: 36.8 (03 May 2025 13:35)  T(F): 98.2 (03 May 2025 13:35), Max: 98.2 (03 May 2025 13:35)  HR: 97 (03 May 2025 17:52) (74 - 112)  BP: 109/76 (03 May 2025 17:52) (73/45 - 128/81)  BP(mean): 89 (03 May 2025 17:52) (53 - 101)  RR: 16 (03 May 2025 17:52) (14 - 20)  SpO2: 97% (03 May 2025 17:52) (91% - 98%)    Parameters below as of 03 May 2025 17:52  Patient On (Oxygen Delivery Method): room air      I&O's Summary    02 May 2025 07:01  -  03 May 2025 07:00  --------------------------------------------------------  IN: 3120 mL / OUT: 800 mL / NET: 2320 mL    03 May 2025 07:01  -  03 May 2025 22:00  --------------------------------------------------------  IN: 1120 mL / OUT: 750 mL / NET: 370 mL        PHYSICAL EXAM:  Gen: appears stated age, resting comfortably, NAD  HEENT: NCAT, MMM  Neck: supple  CV: irreg irreg, peripheral pulses 2+  Pulm: CTAB, no increased work of breathing, no rales/rhonchi  Abd: soft, ND, NT, no rebound or guarding  Skin: warm and dry  Ext: non-tender, no edema  Neuro: Alert, oriented to self only, moving all extremeties    LABS:              CAPILLARY BLOOD GLUCOSE            MICRODATA:      RADIOLOGY/OTHER STUDIES:  Reviewed

## 2025-05-03 NOTE — PROGRESS NOTE ADULT - PROBLEM SELECTOR PLAN 5
F: none  E: replete as needed  N: minced and moist  VTE ppx: Eliquis  GI ppx: pantoprazole  Dispo: [facility]  Code status: full

## 2025-05-03 NOTE — PROGRESS NOTE ADULT - PROBLEM SELECTOR PLAN 5
F: none  E: replete as needed  N: minced and moist  VTE ppx: eliquis  GI ppx: pantoprazole  Dispo: [facility]  Code status: full

## 2025-05-03 NOTE — EEG REPORT - NS EEG TEXT BOX
[facility] Department of Neurology  Epilepsy Monitoring Unit video-Electroencephalogram    Patient Name:	SUKHJINDER DIEGO    :	1945  MRN:	[id number]    Study Start Date/Time:  2025, 12:14:52 AM  Study End Date/Time: in progress    Referred by: Dr. Souhel Najjar    Brief Clinical History:  SUKHJINDER DIEGO is a 80 year old Female with epilepsy, nausea and vomiting; study performed to investigate for seizures or markers of epilepsy.  Diagnosis Code:   R56.9 convulsions/seizure    Pertinent Medications:  Carbamazepine, Depakote (IV Vimpat given in lieu of Carbamazepine due to vomiting)    Acquisition Details:  Electroencephalography was acquired using a minimum of 21 channels on an [employer] system v 9.3.1 with electrode placement according to the standard International 10-20 system following ACNS (American Clinical Neurophysiology Society) guidelines for Long-Term Video EEG monitoring.  Anterior temporal T1 and T2 electrodes were utilized whenever possible.   The [employer] automated spike & seizure detections were all reviewed in detail, in addition to extensive portions of raw EEG.  The live video was monitored continuously by trained technicians to identify events and specialty nurses trained in seizure management supervised the care of the patient in the epilepsy unit.      Daily Updates (from 07:00 am until 07:00 am):  Day 5:  2025- 5/3/2025  Background:  continuous, with predominantly theta frequencies, overall frequencies are faster than seen in previous days.  Symmetry:  Occasional (1-9%) right temporal polymorphic delta slowing.   Posterior Dominant Rhythm:  symmetric, 7 Hz.  Organization: Rudimentary.  Voltage:  Normal (20+ uV)  Variability: Yes. 		Reactivity: Yes.  N2 sleep: Symmetric, synchronous spindles and K complexes.  Spontaneous Activity: No epileptiform discharges.  Periodic/rhythmic activity:  None.  Events:  No electrographic seizures.  Provocations:  Hyperventilation and Photic stimulation: was not performed.    Daily Summary:    -Improving mild generalized slowing suggestive of a similar degree of diffuse or multifocal  dysfunction with superimposed right temporal slowing/ focal cerebral dysfunction.  -No seizures captured and no epileptiform discharges.    Clinical note: may disconnect EEG if being planned for discharge today    Claudia Owens DO  Attending Neurologist, [facility] Epilepsy [facility]

## 2025-05-03 NOTE — PROGRESS NOTE ADULT - PROBLEM SELECTOR PROBLEM 4
Alzheimer dementia with agitation
Hypertension
Alzheimer dementia with agitation
Alzheimer dementia with agitation
Hypertension
Alzheimer dementia with agitation

## 2025-05-03 NOTE — PROGRESS NOTE ADULT - PROBLEM SELECTOR PROBLEM 3
Hypertension
Alzheimer dementia with agitation
Hypertension
Alzheimer dementia with agitation
Hypertension
Hypertension

## 2025-05-04 VITALS
HEART RATE: 87 BPM | DIASTOLIC BLOOD PRESSURE: 55 MMHG | OXYGEN SATURATION: 96 % | RESPIRATION RATE: 18 BRPM | SYSTOLIC BLOOD PRESSURE: 96 MMHG

## 2025-05-04 LAB
ALBUMIN SERPL ELPH-MCNC: 2.6 G/DL — LOW (ref 3.3–5)
ALP SERPL-CCNC: 36 U/L — LOW (ref 40–120)
ALT FLD-CCNC: 10 U/L — SIGNIFICANT CHANGE UP (ref 10–45)
ANION GAP SERPL CALC-SCNC: 6 MMOL/L — SIGNIFICANT CHANGE UP (ref 5–17)
AST SERPL-CCNC: 14 U/L — SIGNIFICANT CHANGE UP (ref 10–40)
BILIRUB SERPL-MCNC: 0.2 MG/DL — SIGNIFICANT CHANGE UP (ref 0.2–1.2)
BUN SERPL-MCNC: 14 MG/DL — SIGNIFICANT CHANGE UP (ref 7–23)
CALCIUM SERPL-MCNC: 8 MG/DL — LOW (ref 8.4–10.5)
CHLORIDE SERPL-SCNC: 110 MMOL/L — HIGH (ref 96–108)
CO2 SERPL-SCNC: 24 MMOL/L — SIGNIFICANT CHANGE UP (ref 22–31)
CREAT SERPL-MCNC: 0.53 MG/DL — SIGNIFICANT CHANGE UP (ref 0.5–1.3)
EGFR: 95 ML/MIN/1.73M2 — SIGNIFICANT CHANGE UP
EGFR: 95 ML/MIN/1.73M2 — SIGNIFICANT CHANGE UP
GLUCOSE SERPL-MCNC: 81 MG/DL — SIGNIFICANT CHANGE UP (ref 70–99)
HCT VFR BLD CALC: 37.1 % — SIGNIFICANT CHANGE UP (ref 34.5–45)
HGB BLD-MCNC: 12 G/DL — SIGNIFICANT CHANGE UP (ref 11.5–15.5)
MAGNESIUM SERPL-MCNC: 2.1 MG/DL — SIGNIFICANT CHANGE UP (ref 1.6–2.6)
MCHC RBC-ENTMCNC: 31.7 PG — SIGNIFICANT CHANGE UP (ref 27–34)
MCHC RBC-ENTMCNC: 32.3 G/DL — SIGNIFICANT CHANGE UP (ref 32–36)
MCV RBC AUTO: 98.1 FL — SIGNIFICANT CHANGE UP (ref 80–100)
NRBC BLD AUTO-RTO: 0 /100 WBCS — SIGNIFICANT CHANGE UP (ref 0–0)
PHOSPHATE SERPL-MCNC: 2.3 MG/DL — LOW (ref 2.5–4.5)
PLATELET # BLD AUTO: 154 K/UL — SIGNIFICANT CHANGE UP (ref 150–400)
POTASSIUM SERPL-MCNC: 4.4 MMOL/L — SIGNIFICANT CHANGE UP (ref 3.5–5.3)
POTASSIUM SERPL-SCNC: 4.4 MMOL/L — SIGNIFICANT CHANGE UP (ref 3.5–5.3)
PROT SERPL-MCNC: 6 G/DL — SIGNIFICANT CHANGE UP (ref 6–8.3)
RBC # BLD: 3.78 M/UL — LOW (ref 3.8–5.2)
RBC # FLD: 13.4 % — SIGNIFICANT CHANGE UP (ref 10.3–14.5)
SODIUM SERPL-SCNC: 140 MMOL/L — SIGNIFICANT CHANGE UP (ref 135–145)
WBC # BLD: 6.88 K/UL — SIGNIFICANT CHANGE UP (ref 3.8–10.5)
WBC # FLD AUTO: 6.88 K/UL — SIGNIFICANT CHANGE UP (ref 3.8–10.5)

## 2025-05-04 PROCEDURE — 95713 VEEG 2-12 HR CONT MNTR: CPT

## 2025-05-04 PROCEDURE — 87536 HIV-1 QUANT&REVRSE TRNSCRPJ: CPT

## 2025-05-04 PROCEDURE — 74177 CT ABD & PELVIS W/CONTRAST: CPT | Mod: MC

## 2025-05-04 PROCEDURE — 92610 EVALUATE SWALLOWING FUNCTION: CPT

## 2025-05-04 PROCEDURE — 99232 SBSQ HOSP IP/OBS MODERATE 35: CPT

## 2025-05-04 PROCEDURE — 71045 X-RAY EXAM CHEST 1 VIEW: CPT

## 2025-05-04 PROCEDURE — 99239 HOSP IP/OBS DSCHRG MGMT >30: CPT

## 2025-05-04 PROCEDURE — 96361 HYDRATE IV INFUSION ADD-ON: CPT

## 2025-05-04 PROCEDURE — 84132 ASSAY OF SERUM POTASSIUM: CPT

## 2025-05-04 PROCEDURE — 93321 DOPPLER ECHO F-UP/LMTD STD: CPT

## 2025-05-04 PROCEDURE — 81003 URINALYSIS AUTO W/O SCOPE: CPT

## 2025-05-04 PROCEDURE — 84443 ASSAY THYROID STIM HORMONE: CPT

## 2025-05-04 PROCEDURE — 80156 ASSAY CARBAMAZEPINE TOTAL: CPT

## 2025-05-04 PROCEDURE — 84295 ASSAY OF SERUM SODIUM: CPT

## 2025-05-04 PROCEDURE — 83690 ASSAY OF LIPASE: CPT

## 2025-05-04 PROCEDURE — C9254: CPT

## 2025-05-04 PROCEDURE — 82140 ASSAY OF AMMONIA: CPT

## 2025-05-04 PROCEDURE — 82607 VITAMIN B-12: CPT

## 2025-05-04 PROCEDURE — 97165 OT EVAL LOW COMPLEX 30 MIN: CPT

## 2025-05-04 PROCEDURE — 85025 COMPLETE CBC W/AUTO DIFF WBC: CPT

## 2025-05-04 PROCEDURE — 80164 ASSAY DIPROPYLACETIC ACD TOT: CPT

## 2025-05-04 PROCEDURE — 87635 SARS-COV-2 COVID-19 AMP PRB: CPT

## 2025-05-04 PROCEDURE — 70450 CT HEAD/BRAIN W/O DYE: CPT | Mod: MC

## 2025-05-04 PROCEDURE — 99285 EMERGENCY DEPT VISIT HI MDM: CPT

## 2025-05-04 PROCEDURE — 36415 COLL VENOUS BLD VENIPUNCTURE: CPT

## 2025-05-04 PROCEDURE — 82962 GLUCOSE BLOOD TEST: CPT

## 2025-05-04 PROCEDURE — 83735 ASSAY OF MAGNESIUM: CPT

## 2025-05-04 PROCEDURE — 85027 COMPLETE CBC AUTOMATED: CPT

## 2025-05-04 PROCEDURE — 97110 THERAPEUTIC EXERCISES: CPT

## 2025-05-04 PROCEDURE — 84100 ASSAY OF PHOSPHORUS: CPT

## 2025-05-04 PROCEDURE — 96375 TX/PRO/DX INJ NEW DRUG ADDON: CPT

## 2025-05-04 PROCEDURE — 93005 ELECTROCARDIOGRAM TRACING: CPT

## 2025-05-04 PROCEDURE — 82746 ASSAY OF FOLIC ACID SERUM: CPT

## 2025-05-04 PROCEDURE — 82330 ASSAY OF CALCIUM: CPT

## 2025-05-04 PROCEDURE — C8923: CPT

## 2025-05-04 PROCEDURE — 95700 EEG CONT REC W/VID EEG TECH: CPT

## 2025-05-04 PROCEDURE — 95716 VEEG EA 12-26HR CONT MNTR: CPT

## 2025-05-04 PROCEDURE — 97162 PT EVAL MOD COMPLEX 30 MIN: CPT

## 2025-05-04 PROCEDURE — 80053 COMPREHEN METABOLIC PANEL: CPT

## 2025-05-04 PROCEDURE — 83605 ASSAY OF LACTIC ACID: CPT

## 2025-05-04 PROCEDURE — 96374 THER/PROPH/DIAG INJ IV PUSH: CPT

## 2025-05-04 PROCEDURE — 96372 THER/PROPH/DIAG INJ SC/IM: CPT

## 2025-05-04 PROCEDURE — 82803 BLOOD GASES ANY COMBINATION: CPT

## 2025-05-04 RX ORDER — SOD PHOS DI, MONO/K PHOS MONO 250 MG
1 TABLET ORAL ONCE
Refills: 0 | Status: COMPLETED | OUTPATIENT
Start: 2025-05-04 | End: 2025-05-04

## 2025-05-04 RX ADMIN — Medication 1 APPLICATION(S): at 07:11

## 2025-05-04 RX ADMIN — Medication 1000 MILLIGRAM(S): at 07:18

## 2025-05-04 RX ADMIN — APIXABAN 5 MILLIGRAM(S): 2.5 TABLET, FILM COATED ORAL at 07:10

## 2025-05-04 RX ADMIN — Medication 650 MILLIGRAM(S): at 09:27

## 2025-05-04 RX ADMIN — Medication 1 PACKET(S): at 09:28

## 2025-05-04 RX ADMIN — METOPROLOL SUCCINATE 50 MILLIGRAM(S): 50 TABLET, EXTENDED RELEASE ORAL at 09:28

## 2025-05-04 RX ADMIN — ESCITALOPRAM OXALATE 5 MILLIGRAM(S): 20 TABLET ORAL at 09:28

## 2025-05-04 RX ADMIN — CARBAMAZEPINE 200 MILLIGRAM(S): 200 TABLET ORAL at 09:28

## 2025-05-04 RX ADMIN — Medication 650 MILLIGRAM(S): at 10:30

## 2025-05-04 RX ADMIN — Medication 40 MILLIGRAM(S): at 07:10

## 2025-05-04 RX ADMIN — LACOSAMIDE 100 MILLIGRAM(S): 150 TABLET, FILM COATED ORAL at 09:28

## 2025-05-04 RX ADMIN — Medication 1 APPLICATION(S): at 15:22

## 2025-05-04 NOTE — PROGRESS NOTE ADULT - SUBJECTIVE AND OBJECTIVE BOX
EPILEPSY CONSULT PROGRESS NOTE:  Patient seen and examined at bedside, and is much more alert, atrial fibrillation on telemetry but better controlled at rate of 110 or less. There were no staring spells or suspicious activity for seizures reported overnight as well as EEG without any electrographic seizures since 4/29.     Dr Najjar at bedside, and provided detailed plan in anticipation for discharge and follow-up. Daughter and  at bedside, and had no complaints. Daughter expresses interest in resuming home dose levocarnitine 330mg (2 tabs - dissolvable) before meals (this provider reached out to pharmacy, and given only liquid version available, it will be provided by family members to administer to patient).     REVIEW OF SYSTEMS:  Limited due to baseline AMS, but also states mild neck pain.     MEDICATIONS  (STANDING):  apixaban 5 milliGRAM(s) Oral every 12 hours  bacitracin   Ointment 1 Application(s) Topical three times a day  carBAMazepine 200 milliGRAM(s) Oral every 12 hours  escitalopram 5 milliGRAM(s) Oral <User Schedule>  lacosamide 100 milliGRAM(s) Oral every 12 hours  losartan 50 milliGRAM(s) Oral daily  melatonin 5 milliGRAM(s) Oral at bedtime  metoprolol tartrate 37.5 milliGRAM(s) Oral every 12 hours  OLANZapine 5 milliGRAM(s) Oral every 24 hours  polyethylene glycol 3350 17 Gram(s) Oral two times a day  sodium chloride 0.9%. 1000 milliLiter(s) (100 mL/Hr) IV Continuous <Continuous>  valproic acid 1000 milliGRAM(s) Oral every 12 hours    MEDICATIONS  (PRN):  acetaminophen     Tablet .. 650 milliGRAM(s) Oral every 6 hours PRN Temp greater or equal to 38.5C (101.3F), Mild Pain (1 - 3)  benzocaine 20% Spray 1 Spray(s) Topical four times a day PRN sore throat  bisacodyl 5 milliGRAM(s) Oral every 12 hours PRN Constipation  bisacodyl Suppository 10 milliGRAM(s) Rectal daily PRN Constipation  LORazepam   Injectable 2 milliGRAM(s) IV Push once PRN Seizure Activity  ondansetron Injectable 4 milliGRAM(s) IV Push every 8 hours PRN Nausea and/or Vomiting    VITAL SIGNS:  T(C): 37.1 (05-02-25 @ 09:12), Max: 37.1 (05-02-25 @ 09:12)  HR: 109 (05-02-25 @ 08:18) (86 - 125)  BP: 127/81 (05-02-25 @ 08:18) (116/75 - 134/80)  RR: 20 (05-02-25 @ 08:18) (19 - 26)  SpO2: 97% (05-02-25 @ 08:18) (96% - 98%)  Wt(kg): --    PHYSICAL EXAM:  Neurologic:     -Mental Status: Alert and oriented to name. "[facility]", but disoriented to date instead provided accurate date of birth. Speech is fluent with intact naming, repetition, no dysarthria or aphasia noted. Follows some simple commands, but indirectly confused (will attempt to respond with sentences that is unrelated to current topic). State that the red light on finger sensor is blood.      -Cranial Nerves:          II: Visual fields are full to confrontation.          III, IV, VI: EOMI without nystagmus. PERRLA 3mm brisk b/l          V:  Facial sensation V1-V3 equal and intact           VII: Face is symmetric with normal eye closure and smile          VIII: Hearing is bilaterally intact to finger rub          IX, X: Uvula is midline and soft palate rises symmetrically          XI: Head turning and shoulder shrug are intact.          XII: Tongue protrudes midline     -Motor: Normal bulk and tone. B/L UE 5/5, RLE 3/5 and LLE 2/5     -Sensation: Unable to assess 2/2 AMS     -Coordination: Unable to perform 2/2 AMS     -Gait: Deferred    EEG Daily Summary (5/2/25):    -Improving mild to moderate generalized slowing suggestive of a similar degree of diffuse or multifocal  dysfunction with superimposed right temporal slowing/ focal cerebral dysfunction.  -Rare left temporal sharp wave discharges, improving.  -No seizures captured for ~ 72 hrs.    Claudia Owens DO  Attending Neurologist, [facility] Epilepsy Program    Electronic Signatures:  Claudia Owens)  (Signed 02-May-2025 10:35)  	Authored: EEG REPORT     EPILEPSY CONSULT PROGRESS NOTE:  Patient seen and examined at bedside, and is much more alert, no staring spells or suspicious activity for seizures reported overnight. Yesterday her BP dropped 88/51 and d/c cancelled. Today's hemodynamics wnl, planning d/c today.      REVIEW OF SYSTEMS:  Limited due to baseline AMS, but also states mild neck pain.     MEDICATIONS  (STANDING):  apixaban 5 milliGRAM(s) Oral every 12 hours  bacitracin   Ointment 1 Application(s) Topical three times a day  carBAMazepine 200 milliGRAM(s) Oral every 12 hours  escitalopram 5 milliGRAM(s) Oral <User Schedule>  lacosamide 100 milliGRAM(s) Oral every 12 hours  melatonin 5 milliGRAM(s) Oral at bedtime  metoprolol tartrate 50 milliGRAM(s) Oral every 12 hours  OLANZapine 5 milliGRAM(s) Oral every 24 hours  pantoprazole    Tablet 40 milliGRAM(s) Oral before breakfast  polyethylene glycol 3350 17 Gram(s) Oral two times a day  valproic acid 1000 milliGRAM(s) Oral every 12 hours      MEDICATIONS  (PRN):  acetaminophen     Tablet .. 650 milliGRAM(s) Oral every 6 hours PRN Temp greater or equal to 38.5C (101.3F), Mild Pain (1 - 3)  benzocaine 20% Spray 1 Spray(s) Topical four times a day PRN Sore Throat  bisacodyl 5 milliGRAM(s) Oral every 12 hours PRN Constipation  bisacodyl Suppository 10 milliGRAM(s) Rectal daily PRN Constipation  LORazepam   Injectable 2 milliGRAM(s) IV Push once PRN Seizure Activity  ondansetron Injectable 4 milliGRAM(s) IV Push every 8 hours PRN Nausea and/or Vomiting      VITAL SIGNS:  ICU Vital Signs Last 24 Hrs  T(C): 37.1 (04 May 2025 09:05), Max: 37.1 (04 May 2025 09:05)  T(F): 98.7 (04 May 2025 09:05), Max: 98.7 (04 May 2025 09:05)  HR: 101 (04 May 2025 09:08) (74 - 101)  BP: 111/63 (04 May 2025 09:08) (73/45 - 152/89)  BP(mean): 81 (04 May 2025 09:08) (53 - 114)  RR: 18 (04 May 2025 09:08) (14 - 20)  SpO2: 96% (04 May 2025 09:08) (91% - 98%)    O2 Parameters below as of 04 May 2025 09:08  Patient On (Oxygen Delivery Method): room air            PHYSICAL EXAM:  Neurologic:   - Gen: NAD, sitting in the recliner comfortable.      -Mental Status: Alert and oriented to name and  "[facility]", but disoriented to date - stated 2027. Speech is fluent with intact naming, repetition, no dysarthria or aphasia noted. Follows some simple commands, but indirectly confused (will attempt to respond with sentences that is unrelated to current topic).       -Cranial Nerves:          II: Visual fields are full to confrontation.          III, IV, VI: EOMI without nystagmus. PERRLA 3mm brisk b/l          V:  Facial sensation V1-V3 equal and intact           VII: Face is symmetric with normal eye closure and smile          VIII: Hearing is bilaterally intact to finger rub          IX, X: Uvula is midline and soft palate rises symmetrically          XI: Head turning and shoulder shrug are intact.          XII: Tongue protrudes midline     -Motor: Normal bulk and tone. B/L UE 5/5, RLE 3/5 and LLE 2/5, High frequency kinesigenic tremor (7-8 Hz)r in b/l UE in outstretched and head during movements, speech.      -Sensation: Unable to assess 2/2 AMS     -Coordination: Unable to perform 2/2 AMS     -Gait: Deferred    EEG Daily Summary (5/2/25):    -Improving mild to moderate generalized slowing suggestive of a similar degree of diffuse or multifocal  dysfunction with superimposed right temporal slowing/ focal cerebral dysfunction.  -Rare left temporal sharp wave discharges, improving.  -No seizures captured for ~ 72 hrs.    Claudia Owens DO  Attending Neurologist, [facility] Epilepsy Program    Electronic Signatures:  Claudia Owens)  (Signed 02-May-2025 10:35)  	Authored: EEG REPORT     EPILEPSY CONSULT PROGRESS NOTE:  Patient seen and examined at bedside, and is much more alert, no staring spells or suspicious activity for seizures reported overnight. Yesterday her BP dropped 88/51 and d/c cancelled. Today's hemodynamics wnl, planning d/c today.      REVIEW OF SYSTEMS:  Limited due to baseline AMS, but also states mild neck pain.     MEDICATIONS  (STANDING):  apixaban 5 milliGRAM(s) Oral every 12 hours  bacitracin   Ointment 1 Application(s) Topical three times a day  carBAMazepine 200 milliGRAM(s) Oral every 12 hours  escitalopram 5 milliGRAM(s) Oral <User Schedule>  lacosamide 100 milliGRAM(s) Oral every 12 hours  melatonin 5 milliGRAM(s) Oral at bedtime  metoprolol tartrate 50 milliGRAM(s) Oral every 12 hours  OLANZapine 5 milliGRAM(s) Oral every 24 hours  pantoprazole    Tablet 40 milliGRAM(s) Oral before breakfast  polyethylene glycol 3350 17 Gram(s) Oral two times a day  valproic acid 1000 milliGRAM(s) Oral every 12 hours      MEDICATIONS  (PRN):  acetaminophen     Tablet .. 650 milliGRAM(s) Oral every 6 hours PRN Temp greater or equal to 38.5C (101.3F), Mild Pain (1 - 3)  benzocaine 20% Spray 1 Spray(s) Topical four times a day PRN Sore Throat  bisacodyl 5 milliGRAM(s) Oral every 12 hours PRN Constipation  bisacodyl Suppository 10 milliGRAM(s) Rectal daily PRN Constipation  LORazepam   Injectable 2 milliGRAM(s) IV Push once PRN Seizure Activity  ondansetron Injectable 4 milliGRAM(s) IV Push every 8 hours PRN Nausea and/or Vomiting      VITAL SIGNS:  ICU Vital Signs Last 24 Hrs  T(C): 37.1 (04 May 2025 09:05), Max: 37.1 (04 May 2025 09:05)  T(F): 98.7 (04 May 2025 09:05), Max: 98.7 (04 May 2025 09:05)  HR: 101 (04 May 2025 09:08) (74 - 101)  BP: 111/63 (04 May 2025 09:08) (73/45 - 152/89)  BP(mean): 81 (04 May 2025 09:08) (53 - 114)  RR: 18 (04 May 2025 09:08) (14 - 20)  SpO2: 96% (04 May 2025 09:08) (91% - 98%)    O2 Parameters below as of 04 May 2025 09:08  Patient On (Oxygen Delivery Method): room air        PHYSICAL EXAM:  Neurologic:   - Gen: NAD, sitting in the recliner comfortable.      -Mental Status: Alert and oriented to name and  "[facility]", but disoriented to date - stated 2027. Speech is fluent with intact naming, repetition, no dysarthria or aphasia noted. Follows some simple commands, but indirectly confused (will attempt to respond with comments unrelated to current topic).       -Cranial Nerves:          II: Visual fields are full to confrontation.          III, IV, VI: EOMI without nystagmus. PERRLA 3mm brisk b/l          V:  Facial sensation V1-V3 equal and intact           VII: Face is symmetric with normal eye closure and smile          VIII: Hearing is bilaterally intact to finger rub          IX, X: Uvula is midline and soft palate rises symmetrically          XI: Head turning and shoulder shrug are intact.          XII: Tongue protrudes midline     -Motor: Normal bulk and tone. B/L UE 5/5, RLE 3/5 and LLE 2/5, High frequency kinesigenic tremor (7-8 Hz)r in b/l UE in outstretched and head during movements, speech.      -Sensation: Unable to assess 2/2 AMS     -Coordination: Unable to perform 2/2 AMS     -Gait: Deferred    EEG Daily Summary (5/2/25):    -Improving mild to moderate generalized slowing suggestive of a similar degree of diffuse or multifocal  dysfunction with superimposed right temporal slowing/ focal cerebral dysfunction.  -Rare left temporal sharp wave discharges, improving.  -No seizures captured for ~ 72 hrs.    Claudia Owens DO  Attending Neurologist, [facility] Epilepsy Program    Electronic Signatures:  Claudia Owens)  (Signed 02-May-2025 10:35)  	Authored: EEG REPORT

## 2025-05-04 NOTE — PROGRESS NOTE ADULT - ATTENDING COMMENTS
I agree with above A+P, edited where appropriate
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 Grade II DD. Severe enlargement of LA. Mildly calcified aortic valve with near normal motion and mild AI. Mitral annular calcification. Myxomatous leaflets with posterior leaflet prolapse and mild/moderate MR. RA enlarged. Mild moderate TR and mild pulmonary hypertension, PASP 46 mmHg.  - EKG 4/30/2025 21:48: A flutter variable AV block,   - EKG 4/30/2025 21:47: A flutter with 2:1 conduction     Home Medications: Metoprolol Succinate 25 mg QD, Olmesartan 20mg QD    Cardiologist: Dr. Cristofer Brownlee     # Tachyarrhythmias: Atrial Tachycardia/Fib/Flutter  # HTN    - Tele reviewed. Patient currently in rate controlled flutter with intermittent episodes of Atrial flutter with RVR  - Echo showed normal BIV function with Marked MAC without significant regurgitations  - At this time, to further rate Cantrol, would increase lopressor to 50 mg po BID with strict holding parameters  - If BP precludes use of Lopressor and or Uptitration and patient in Aflutter with RVR with rates > 110 for more >2 hours, would consider Digoxin load (750 mcg Total) provided no active interaction with Epileptic Medications, in which case would load with reduced dose  - CHADSVASC at least 4, placing patient at higher thromboembolic risk. Would continue Eliquis 5mg BID (Age 80; 64.9 Kg; Cr: 0.64).   - Continue losartan 50mg qd  - Cardiology will continue to follow with you, please call with any questions
Pt doing much better with antiemetics and treatment of constipation.  Recommend consistent use of powdered MiraLAX daily with meals.   Dietary advice provided as well.  Recommend outpatient follow up. This has been arranged.  Diet as tolerated.     GI will sign off. Please call us back as needed.
Pt seen and examined, daughter at bedside. Hoping for d/c home today if medically stable, neurologically doing great. Has f/u virtual visit with WYATT aCrrera tomorrow afternoon.
80F with PMH of brain tumor resection in 2003, alzheimer's dementia, seizures, breast cancer and hypertension, admitted to the epilepsy service with nausea, vomiting and worsening cognition with increased frequency of staring spell and overall decline, transferred to medicine for further management of afib with RVR.     Labs and imaging reviewed    Problem List  #Gastroenteritis  #Epilepsy with recurrent seizures  #Chronic Afib with RVR  #Alzheimer's Dementia  #HTN    Plan  -Patient with improvement in HR will D/C tele today  -Now tolerating PO, increasing PO Metoprolol today  -Epilepsy on board and aiding in management of seizres  -Medically ready for discharge, plan for transition tomorrow after reinstating home health aides    Rest as above

## 2025-05-04 NOTE — PROGRESS NOTE ADULT - REASON FOR ADMISSION
nausea and vomiting

## 2025-05-04 NOTE — PROGRESS NOTE ADULT - ASSESSMENT
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     Patient had 3 electrographic seizures on 4/29, one which correlated with her staring spells witnessed by daughters at home. Last seizure was 5:36 pm on 4/29/25 and successfully aborted with addition of Vimpat. Now concerns for rapid atrial fibrillation that started overnight around 12am 5/1/2025 with multiple med adjustments for rate control, and improving. Vimpat has a lower risk for atrial fib/tachycardia but as a precaution, dose was decreased back to 100mg BID temporarily. Most likely etiology for new onset afib is patient's inconsistent beta blocker use (due to initial refusal of oral meds) and age-related factors along with prior history of atrial tachycardia.     Recommendations:    - Continue vEEG monitoring  - Please change Vimpat IV to PO 100mg Q12hrs  - Please change Depakote IV to PO 1000mg Q12hrs  - Please reduce carbamazepine 450mg BID to 200mg BID starting tonight  - Also , in addition to meds above, will be discharged on the following mood Rx regimen which has been working very well : continue lexapro 5mg daily and zyprexa 5mg PO QHS (8pm) upon discharge.   - Follow-up appointments will be provided to patient for televisit with Dr Najjar's NP Gretchen Gaytan on 5/5/25 at 2pm and in-person visit with Najjar on 5/14/25 at 11am   - Maintain Seizure/Fall/Aspiration precautions.   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     Patient had 3 electrographic seizures on 4/29, one which correlated with her staring spells witnessed by daughters at home. Last seizure was 5:36 pm on 4/29/25 and successfully aborted with addition of Vimpat. Now concerns for rapid atrial fibrillation that started overnight around 12am 5/1/2025 with multiple med adjustments for rate control, and improving. Vimpat has a lower risk for atrial fib/tachycardia but as a precaution, dose was decreased back to 100mg BID temporarily. Most likely etiology for new onset afib is patient's inconsistent beta blocker use (due to initial refusal of oral meds) and age-related factors along with prior history of atrial tachycardia.     Recommendations:     - C/w Vimpat PO 100mg Q12hrs  - Cw/ Depakote PO 1000mg Q12hrs  - C/w Carbamazepine 200mg BID  - Also , in addition to meds above, will be discharged on the following mood Rx regimen which has been working very well : continue lexapro 5mg daily and zyprexa 5mg PO QHS (8pm) upon discharge.   - Follow-up appointments will be provided to patient for televisit with Dr Najjar's NP Gretchen Gaytan on 5/5/25 at 2pm and in-person visit with Najjar on 5/14/25 at 11am   - Maintain Seizure/Fall/Aspiration precautions.

## 2025-05-04 NOTE — PROGRESS NOTE ADULT - PROVIDER SPECIALTY LIST ADULT
Cardiology
Epilepsy
Epilepsy
Gastroenterology
Hospitalist
Internal Medicine
Epilepsy
Epilepsy
Internal Medicine
Rehab Medicine
Cardiology
Hospitalist
Epilepsy
Internal Medicine
Internal Medicine

## 2025-05-05 ENCOUNTER — APPOINTMENT (OUTPATIENT)
Dept: NEUROLOGY | Facility: CLINIC | Age: 80
End: 2025-05-05
Payer: MEDICARE

## 2025-05-05 PROCEDURE — 99213 OFFICE O/P EST LOW 20 MIN: CPT | Mod: 2W

## 2025-05-05 PROCEDURE — G2211 COMPLEX E/M VISIT ADD ON: CPT | Mod: 2W

## 2025-05-06 ENCOUNTER — NON-APPOINTMENT (OUTPATIENT)
Age: 80
End: 2025-05-06

## 2025-05-07 ENCOUNTER — APPOINTMENT (OUTPATIENT)
Dept: GERIATRICS | Facility: CLINIC | Age: 80
End: 2025-05-07
Payer: MEDICARE

## 2025-05-07 VITALS — SYSTOLIC BLOOD PRESSURE: 118 MMHG | DIASTOLIC BLOOD PRESSURE: 70 MMHG

## 2025-05-07 DIAGNOSIS — J18.9 PNEUMONIA, UNSPECIFIED ORGANISM: ICD-10-CM

## 2025-05-07 DIAGNOSIS — R41.0 DISORIENTATION, UNSPECIFIED: ICD-10-CM

## 2025-05-07 DIAGNOSIS — F51.01 PRIMARY INSOMNIA: ICD-10-CM

## 2025-05-07 DIAGNOSIS — I48.91 UNSPECIFIED ATRIAL FIBRILLATION: ICD-10-CM

## 2025-05-07 PROCEDURE — G2211 COMPLEX E/M VISIT ADD ON: CPT | Mod: 2W

## 2025-05-07 PROCEDURE — 99214 OFFICE O/P EST MOD 30 MIN: CPT | Mod: 2W

## 2025-05-07 RX ORDER — METOPROLOL TARTRATE 50 MG/1
50 TABLET ORAL
Qty: 60 | Refills: 0 | Status: ACTIVE | COMMUNITY
Start: 2025-05-03

## 2025-05-07 RX ORDER — METOPROLOL TARTRATE 50 MG/1
50 TABLET ORAL
Qty: 180 | Refills: 1 | Status: ACTIVE | COMMUNITY
Start: 2025-05-07 | End: 1900-01-01

## 2025-05-07 RX ORDER — LACOSAMIDE 100 MG/1
100 TABLET ORAL
Qty: 50 | Refills: 0 | Status: COMPLETED | COMMUNITY
Start: 2025-05-03

## 2025-05-07 RX ORDER — LOSARTAN POTASSIUM 50 MG/1
50 TABLET, FILM COATED ORAL
Qty: 30 | Refills: 0 | Status: COMPLETED | COMMUNITY
Start: 2025-05-03

## 2025-05-07 RX ORDER — APIXABAN 5 MG/1
5 TABLET, FILM COATED ORAL
Qty: 60 | Refills: 0 | Status: ACTIVE | COMMUNITY
Start: 2025-05-03

## 2025-05-09 DIAGNOSIS — G97.82 OTHER POSTPROCEDURAL COMPLICATIONS AND DISORDERS OF NERVOUS SYSTEM: ICD-10-CM

## 2025-05-09 DIAGNOSIS — I48.0 PAROXYSMAL ATRIAL FIBRILLATION: ICD-10-CM
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
## 2025-05-09 DIAGNOSIS — I11.9 HYPERTENSIVE HEART DISEASE WITHOUT HEART FAILURE: ICD-10-CM

## 2025-05-09 DIAGNOSIS — Z86.011 PERSONAL HISTORY OF BENIGN NEOPLASM OF THE BRAIN: ICD-10-CM

## 2025-05-09 DIAGNOSIS — R13.10 DYSPHAGIA, UNSPECIFIED: ICD-10-CM

## 2025-05-09 DIAGNOSIS — R11.2 NAUSEA WITH VOMITING, UNSPECIFIED: ICD-10-CM

## 2025-05-09 DIAGNOSIS — K57.90 DIVERTICULOSIS OF INTESTINE, PART UNSPECIFIED, WITHOUT PERFORATION OR ABSCESS WITHOUT BLEEDING: ICD-10-CM

## 2025-05-09 DIAGNOSIS — R45.1 RESTLESSNESS AND AGITATION: ICD-10-CM

## 2025-05-09 DIAGNOSIS — F02.811 DEMENTIA IN OTHER DISEASES CLASSIFIED ELSEWHERE, UNSPECIFIED SEVERITY, WITH AGITATION: ICD-10-CM

## 2025-05-09 DIAGNOSIS — G30.9 ALZHEIMER'S DISEASE, UNSPECIFIED: ICD-10-CM

## 2025-05-09 DIAGNOSIS — Z85.3 PERSONAL HISTORY OF MALIGNANT NEOPLASM OF BREAST: ICD-10-CM

## 2025-05-09 DIAGNOSIS — G40.909 EPILEPSY, UNSPECIFIED, NOT INTRACTABLE, WITHOUT STATUS EPILEPTICUS: ICD-10-CM

## 2025-05-09 DIAGNOSIS — I48.92 UNSPECIFIED ATRIAL FLUTTER: ICD-10-CM
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
## 2025-05-12 ENCOUNTER — APPOINTMENT (OUTPATIENT)
Dept: GERIATRICS | Facility: CLINIC | Age: 80
End: 2025-05-12
Payer: MEDICARE

## 2025-05-12 ENCOUNTER — APPOINTMENT (OUTPATIENT)
Dept: CARDIOLOGY | Facility: CLINIC | Age: 80
End: 2025-05-12
Payer: MEDICARE

## 2025-05-12 DIAGNOSIS — G30.1 ALZHEIMER'S DISEASE WITH LATE ONSET: ICD-10-CM

## 2025-05-12 DIAGNOSIS — F03.911 UNSPECIFIED DEMENTIA, UNSPECIFIED SEVERITY, WITH AGITATION: ICD-10-CM

## 2025-05-12 DIAGNOSIS — R35.0 FREQUENCY OF MICTURITION: ICD-10-CM

## 2025-05-12 DIAGNOSIS — F03.90 UNSPECIFIED DEMENTIA W/OUT BEHAVIORAL DISTURBANCE: ICD-10-CM

## 2025-05-12 DIAGNOSIS — F02.818 ALZHEIMER'S DISEASE WITH LATE ONSET: ICD-10-CM

## 2025-05-12 DIAGNOSIS — I10 ESSENTIAL (PRIMARY) HYPERTENSION: ICD-10-CM

## 2025-05-12 DIAGNOSIS — F41.9 ANXIETY DISORDER, UNSPECIFIED: ICD-10-CM

## 2025-05-12 DIAGNOSIS — Z71.89 OTHER SPECIFIED COUNSELING: ICD-10-CM

## 2025-05-12 DIAGNOSIS — I47.19 OTHER SUPRAVENTRICULAR TACHYCARDIA: ICD-10-CM

## 2025-05-12 PROBLEM — K59.00 CONSTIPATION: Status: ACTIVE | Noted: 2025-05-12

## 2025-05-12 PROCEDURE — G2211 COMPLEX E/M VISIT ADD ON: CPT | Mod: 2W

## 2025-05-12 PROCEDURE — G0520: CPT

## 2025-05-12 PROCEDURE — G2212 PROLONG OUTPT/OFFICE VIS: CPT | Mod: 2W

## 2025-05-12 PROCEDURE — 99214 OFFICE O/P EST MOD 30 MIN: CPT | Mod: 2W

## 2025-05-12 PROCEDURE — 99215 OFFICE O/P EST HI 40 MIN: CPT | Mod: 2W

## 2025-05-12 RX ORDER — APIXABAN 5 MG/1
5 TABLET, FILM COATED ORAL
Qty: 60 | Refills: 2 | Status: ACTIVE | COMMUNITY
Start: 2025-05-07 | End: 1900-01-01

## 2025-05-12 RX ORDER — SENNOSIDES 8.6 MG/1
8.6 CAPSULE, GELATIN COATED ORAL
Qty: 60 | Refills: 1 | Status: ACTIVE | COMMUNITY
Start: 2025-05-12 | End: 1900-01-01

## 2025-05-12 RX ORDER — POLYETHYLENE GLYCOL 3350 17 G/17G
17 POWDER, FOR SOLUTION ORAL DAILY
Qty: 1 | Refills: 3 | Status: ACTIVE | COMMUNITY
Start: 2025-05-12 | End: 1900-01-01

## 2025-05-14 ENCOUNTER — APPOINTMENT (OUTPATIENT)
Dept: NEUROLOGY | Facility: CLINIC | Age: 80
End: 2025-05-14
Payer: MEDICARE

## 2025-05-14 VITALS
OXYGEN SATURATION: 98 % | DIASTOLIC BLOOD PRESSURE: 76 MMHG | SYSTOLIC BLOOD PRESSURE: 114 MMHG | HEART RATE: 70 BPM | HEIGHT: 64 IN

## 2025-05-14 DIAGNOSIS — G40.909 EPILEPSY, UNSPECIFIED, NOT INTRACTABLE, W/OUT STATUS EPILEPTICUS: ICD-10-CM

## 2025-05-14 PROCEDURE — G2211 COMPLEX E/M VISIT ADD ON: CPT

## 2025-05-14 PROCEDURE — 99214 OFFICE O/P EST MOD 30 MIN: CPT

## 2025-05-14 RX ORDER — DIVALPROEX SODIUM 250 MG/1
250 TABLET, DELAYED RELEASE ORAL
Qty: 90 | Refills: 1 | Status: ACTIVE | COMMUNITY
Start: 2025-05-14 | End: 1900-01-01

## 2025-05-14 RX ORDER — LACOSAMIDE 200 MG/1
200 TABLET ORAL
Qty: 60 | Refills: 3 | Status: ACTIVE | COMMUNITY
Start: 2025-05-06 | End: 1900-01-01

## 2025-05-14 RX ORDER — ESCITALOPRAM OXALATE 5 MG/1
5 TABLET ORAL
Qty: 90 | Refills: 0 | Status: ACTIVE | COMMUNITY
Start: 2025-05-02

## 2025-05-14 RX ORDER — OLANZAPINE 5 MG/1
5 TABLET, FILM COATED ORAL
Qty: 90 | Refills: 1 | Status: ACTIVE | COMMUNITY
Start: 2025-05-02

## 2025-05-14 RX ORDER — CARBAMAZEPINE 200 MG/1
200 TABLET ORAL
Qty: 60 | Refills: 0 | Status: DISCONTINUED | COMMUNITY
Start: 2025-05-03 | End: 2025-05-14

## 2025-05-15 ENCOUNTER — APPOINTMENT (OUTPATIENT)
Dept: GASTROENTEROLOGY | Facility: CLINIC | Age: 80
End: 2025-05-15
Payer: MEDICARE

## 2025-05-15 DIAGNOSIS — K59.00 CONSTIPATION, UNSPECIFIED: ICD-10-CM

## 2025-05-15 PROCEDURE — 99214 OFFICE O/P EST MOD 30 MIN: CPT | Mod: 2W

## 2025-05-15 PROCEDURE — G2211 COMPLEX E/M VISIT ADD ON: CPT | Mod: 2W

## 2025-05-29 ENCOUNTER — NON-APPOINTMENT (OUTPATIENT)
Age: 80
End: 2025-05-29

## 2025-05-29 ENCOUNTER — APPOINTMENT (OUTPATIENT)
Dept: NEUROLOGY | Facility: CLINIC | Age: 80
End: 2025-05-29

## 2025-06-03 ENCOUNTER — NON-APPOINTMENT (OUTPATIENT)
Age: 80
End: 2025-06-03

## 2025-06-03 DIAGNOSIS — F03.911 UNSPECIFIED DEMENTIA, UNSPECIFIED SEVERITY, WITH AGITATION: ICD-10-CM

## 2025-06-03 DIAGNOSIS — F03.90 UNSPECIFIED DEMENTIA W/OUT BEHAVIORAL DISTURBANCE: ICD-10-CM

## 2025-06-03 PROBLEM — Z63.6 CAREGIVER BURDEN: Status: ACTIVE | Noted: 2025-06-03

## 2025-06-17 ENCOUNTER — TRANSCRIPTION ENCOUNTER (OUTPATIENT)
Age: 80
End: 2025-06-17

## 2025-06-19 ENCOUNTER — TRANSCRIPTION ENCOUNTER (OUTPATIENT)
Age: 80
End: 2025-06-19

## 2025-06-30 ENCOUNTER — APPOINTMENT (OUTPATIENT)
Dept: GERIATRICS | Facility: CLINIC | Age: 80
End: 2025-06-30
Payer: MEDICARE

## 2025-06-30 VITALS
BODY MASS INDEX: 24.41 KG/M2 | OXYGEN SATURATION: 96 % | SYSTOLIC BLOOD PRESSURE: 112 MMHG | HEIGHT: 64 IN | WEIGHT: 143 LBS | DIASTOLIC BLOOD PRESSURE: 75 MMHG | RESPIRATION RATE: 16 BRPM | TEMPERATURE: 97.6 F | HEART RATE: 108 BPM

## 2025-06-30 PROBLEM — N64.59 ABNORMAL BREAST EXAM: Status: ACTIVE | Noted: 2025-06-30

## 2025-06-30 PROCEDURE — 99497 ADVNCD CARE PLAN 30 MIN: CPT | Mod: NC

## 2025-06-30 PROCEDURE — G2211 COMPLEX E/M VISIT ADD ON: CPT

## 2025-06-30 PROCEDURE — 99214 OFFICE O/P EST MOD 30 MIN: CPT

## 2025-07-02 ENCOUNTER — TRANSCRIPTION ENCOUNTER (OUTPATIENT)
Age: 80
End: 2025-07-02

## 2025-07-02 ENCOUNTER — NON-APPOINTMENT (OUTPATIENT)
Age: 80
End: 2025-07-02

## 2025-07-08 ENCOUNTER — APPOINTMENT (OUTPATIENT)
Dept: GERIATRICS | Facility: CLINIC | Age: 80
End: 2025-07-08
Payer: MEDICARE

## 2025-07-08 VITALS
HEART RATE: 70 BPM | DIASTOLIC BLOOD PRESSURE: 72 MMHG | RESPIRATION RATE: 14 BRPM | SYSTOLIC BLOOD PRESSURE: 78 MMHG | TEMPERATURE: 190.4 F | OXYGEN SATURATION: 90 %

## 2025-07-08 PROCEDURE — 99350 HOME/RES VST EST HIGH MDM 60: CPT

## 2025-07-08 RX ORDER — AMOXICILLIN 500 MG/1
500 TABLET, FILM COATED ORAL
Qty: 36 | Refills: 0 | Status: ACTIVE | COMMUNITY
Start: 2025-07-08 | End: 1900-01-01

## 2025-07-09 PROBLEM — F03.C0 ADVANCED DEMENTIA: Status: ACTIVE | Noted: 2025-07-08

## 2025-07-11 ENCOUNTER — APPOINTMENT (OUTPATIENT)
Dept: GERIATRICS | Facility: CLINIC | Age: 80
End: 2025-07-11

## 2025-07-11 PROBLEM — J69.0 ASPIRATION PNEUMONIA: Status: ACTIVE | Noted: 2025-07-08

## 2025-07-11 PROCEDURE — 99215 OFFICE O/P EST HI 40 MIN: CPT | Mod: 2W

## 2025-07-21 ENCOUNTER — TRANSCRIPTION ENCOUNTER (OUTPATIENT)
Age: 80
End: 2025-07-21

## 2025-08-02 ENCOUNTER — NON-APPOINTMENT (OUTPATIENT)
Age: 80
End: 2025-08-02

## 2025-08-04 ENCOUNTER — APPOINTMENT (OUTPATIENT)
Dept: GERIATRICS | Facility: CLINIC | Age: 80
End: 2025-08-04
Payer: MEDICARE

## 2025-08-04 DIAGNOSIS — F03.911 UNSPECIFIED DEMENTIA, UNSPECIFIED SEVERITY, WITH AGITATION: ICD-10-CM

## 2025-08-04 DIAGNOSIS — I48.91 UNSPECIFIED ATRIAL FIBRILLATION: ICD-10-CM

## 2025-08-04 DIAGNOSIS — G40.909 EPILEPSY, UNSPECIFIED, NOT INTRACTABLE, W/OUT STATUS EPILEPTICUS: ICD-10-CM

## 2025-08-04 PROCEDURE — G2211 COMPLEX E/M VISIT ADD ON: CPT | Mod: 2W

## 2025-08-04 PROCEDURE — 99214 OFFICE O/P EST MOD 30 MIN: CPT | Mod: 2W

## 2025-08-04 RX ORDER — VALPROIC ACID 500 MG/10ML
500 SOLUTION ORAL
Qty: 250 | Refills: 0 | Status: ACTIVE | COMMUNITY
Start: 2025-08-04